# Patient Record
Sex: MALE | Race: WHITE | Employment: OTHER | URBAN - METROPOLITAN AREA
[De-identification: names, ages, dates, MRNs, and addresses within clinical notes are randomized per-mention and may not be internally consistent; named-entity substitution may affect disease eponyms.]

---

## 2017-01-25 RX ORDER — AMITRIPTYLINE HYDROCHLORIDE 25 MG/1
TABLET, FILM COATED ORAL
Qty: 30 TABLET | Refills: 0 | Status: SHIPPED | OUTPATIENT
Start: 2017-01-25 | End: 2017-02-01 | Stop reason: SDUPTHER

## 2017-02-01 ENCOUNTER — OFFICE VISIT (OUTPATIENT)
Dept: INTERNAL MEDICINE | Age: 67
End: 2017-02-01

## 2017-02-01 VITALS
HEART RATE: 79 BPM | BODY MASS INDEX: 22.88 KG/M2 | HEIGHT: 68 IN | SYSTOLIC BLOOD PRESSURE: 108 MMHG | DIASTOLIC BLOOD PRESSURE: 60 MMHG | WEIGHT: 151 LBS | TEMPERATURE: 97.3 F

## 2017-02-01 DIAGNOSIS — F41.8 OTHER SPECIFIED ANXIETY DISORDERS: Primary | Chronic | ICD-10-CM

## 2017-02-01 DIAGNOSIS — Z86.59 HISTORY OF DEPRESSION: ICD-10-CM

## 2017-02-01 DIAGNOSIS — Z72.0 SMOKING TRYING TO QUIT: ICD-10-CM

## 2017-02-01 PROCEDURE — 99213 OFFICE O/P EST LOW 20 MIN: CPT | Performed by: INTERNAL MEDICINE

## 2017-02-01 RX ORDER — SILDENAFIL 50 MG/1
TABLET, FILM COATED ORAL
Qty: 15 TABLET | Refills: 2 | Status: SHIPPED | OUTPATIENT
Start: 2017-02-01 | End: 2019-03-05 | Stop reason: ALTCHOICE

## 2017-02-01 RX ORDER — AMITRIPTYLINE HYDROCHLORIDE 25 MG/1
TABLET, FILM COATED ORAL
Qty: 30 TABLET | Refills: 5 | Status: SHIPPED | OUTPATIENT
Start: 2017-02-01 | End: 2018-02-14 | Stop reason: SDUPTHER

## 2017-02-01 ASSESSMENT — ENCOUNTER SYMPTOMS
EYE PAIN: 0
RESPIRATORY NEGATIVE: 1
GASTROINTESTINAL NEGATIVE: 1
ABDOMINAL PAIN: 0
SHORTNESS OF BREATH: 0
COLOR CHANGE: 0
BACK PAIN: 1

## 2017-02-01 ASSESSMENT — PATIENT HEALTH QUESTIONNAIRE - PHQ9
SUM OF ALL RESPONSES TO PHQ9 QUESTIONS 1 & 2: 0
1. LITTLE INTEREST OR PLEASURE IN DOING THINGS: 0
SUM OF ALL RESPONSES TO PHQ QUESTIONS 1-9: 0
2. FEELING DOWN, DEPRESSED OR HOPELESS: 0

## 2017-02-14 ENCOUNTER — OFFICE VISIT (OUTPATIENT)
Dept: INTERNAL MEDICINE | Age: 67
End: 2017-02-14

## 2017-02-14 VITALS
HEART RATE: 98 BPM | BODY MASS INDEX: 23.79 KG/M2 | WEIGHT: 157 LBS | TEMPERATURE: 97.8 F | OXYGEN SATURATION: 98 % | HEIGHT: 68 IN | DIASTOLIC BLOOD PRESSURE: 80 MMHG | SYSTOLIC BLOOD PRESSURE: 110 MMHG

## 2017-02-14 DIAGNOSIS — Z12.5 SCREENING FOR PROSTATE CANCER: ICD-10-CM

## 2017-02-14 DIAGNOSIS — Z00.00 ROUTINE GENERAL MEDICAL EXAMINATION AT A HEALTH CARE FACILITY: ICD-10-CM

## 2017-02-14 DIAGNOSIS — Z23 NEED FOR PROPHYLACTIC VACCINATION WITH COMBINED DIPHTHERIA-TETANUS-PERTUSSIS (DTP) VACCINE: ICD-10-CM

## 2017-02-14 DIAGNOSIS — Z13.1 SCREENING FOR DIABETES MELLITUS (DM): ICD-10-CM

## 2017-02-14 DIAGNOSIS — Z23 NEED FOR SHINGLES VACCINE: ICD-10-CM

## 2017-02-14 PROCEDURE — G0438 PPPS, INITIAL VISIT: HCPCS | Performed by: INTERNAL MEDICINE

## 2017-02-14 ASSESSMENT — PATIENT HEALTH QUESTIONNAIRE - PHQ9: SUM OF ALL RESPONSES TO PHQ QUESTIONS 1-9: 0

## 2017-02-14 ASSESSMENT — ANXIETY QUESTIONNAIRES: GAD7 TOTAL SCORE: 1

## 2017-02-14 ASSESSMENT — LIFESTYLE VARIABLES: HOW OFTEN DO YOU HAVE A DRINK CONTAINING ALCOHOL: 0

## 2017-02-27 ENCOUNTER — OFFICE VISIT (OUTPATIENT)
Dept: INTERNAL MEDICINE | Age: 67
End: 2017-02-27

## 2017-02-27 VITALS
HEART RATE: 82 BPM | TEMPERATURE: 97.8 F | HEIGHT: 68 IN | SYSTOLIC BLOOD PRESSURE: 110 MMHG | WEIGHT: 157 LBS | DIASTOLIC BLOOD PRESSURE: 70 MMHG | BODY MASS INDEX: 23.79 KG/M2

## 2017-02-27 DIAGNOSIS — F17.210 NICOTINE DEPENDENCE, CIGARETTES, UNCOMPLICATED: Primary | ICD-10-CM

## 2017-02-27 PROCEDURE — 99212 OFFICE O/P EST SF 10 MIN: CPT | Performed by: INTERNAL MEDICINE

## 2017-02-27 PROCEDURE — G0296 VISIT TO DETERM LDCT ELIG: HCPCS | Performed by: INTERNAL MEDICINE

## 2017-02-27 ASSESSMENT — ENCOUNTER SYMPTOMS
GASTROINTESTINAL NEGATIVE: 1
STRIDOR: 0
RESPIRATORY NEGATIVE: 1
TROUBLE SWALLOWING: 0
VOICE CHANGE: 0
ABDOMINAL PAIN: 0
ABDOMINAL DISTENTION: 0
SHORTNESS OF BREATH: 0
WHEEZING: 0
BLOOD IN STOOL: 0
COUGH: 0
CHOKING: 0
CHEST TIGHTNESS: 0

## 2017-03-06 ENCOUNTER — HOSPITAL ENCOUNTER (OUTPATIENT)
Dept: CT IMAGING | Age: 67
Discharge: HOME | End: 2017-03-06
Payer: COMMERCIAL

## 2017-03-06 VITALS — HEIGHT: 67 IN | WEIGHT: 151 LBS | BODY MASS INDEX: 23.7 KG/M2

## 2017-03-06 DIAGNOSIS — F17.210 NICOTINE DEPENDENCE, CIGARETTES, UNCOMPLICATED: ICD-10-CM

## 2017-03-06 PROCEDURE — G0297 LDCT FOR LUNG CA SCREEN: HCPCS

## 2017-08-28 ENCOUNTER — OFFICE VISIT (OUTPATIENT)
Dept: INTERNAL MEDICINE | Age: 67
End: 2017-08-28

## 2017-08-28 VITALS
HEART RATE: 88 BPM | WEIGHT: 142 LBS | DIASTOLIC BLOOD PRESSURE: 60 MMHG | HEIGHT: 67 IN | OXYGEN SATURATION: 99 % | TEMPERATURE: 97.2 F | SYSTOLIC BLOOD PRESSURE: 106 MMHG | BODY MASS INDEX: 22.29 KG/M2

## 2017-08-28 DIAGNOSIS — F17.210 CIGARETTE NICOTINE DEPENDENCE WITHOUT COMPLICATION: Primary | Chronic | ICD-10-CM

## 2017-08-28 DIAGNOSIS — Z23 NEED FOR TDAP VACCINATION: ICD-10-CM

## 2017-08-28 DIAGNOSIS — G25.3 MYOCLONUS: ICD-10-CM

## 2017-08-28 PROCEDURE — 99212 OFFICE O/P EST SF 10 MIN: CPT | Performed by: INTERNAL MEDICINE

## 2017-08-28 ASSESSMENT — ENCOUNTER SYMPTOMS
CHOKING: 0
COUGH: 0
TROUBLE SWALLOWING: 0
BLOOD IN STOOL: 0
ABDOMINAL DISTENTION: 0
SHORTNESS OF BREATH: 0
RESPIRATORY NEGATIVE: 1
ABDOMINAL PAIN: 0
BACK PAIN: 1

## 2017-08-29 PROCEDURE — 90471 IMMUNIZATION ADMIN: CPT | Performed by: INTERNAL MEDICINE

## 2017-08-29 PROCEDURE — 90715 TDAP VACCINE 7 YRS/> IM: CPT | Performed by: INTERNAL MEDICINE

## 2018-02-14 RX ORDER — AMITRIPTYLINE HYDROCHLORIDE 25 MG/1
TABLET, FILM COATED ORAL
Qty: 30 TABLET | Refills: 3 | Status: SHIPPED | OUTPATIENT
Start: 2018-02-14 | End: 2018-08-31

## 2018-02-26 ENCOUNTER — OFFICE VISIT (OUTPATIENT)
Dept: INTERNAL MEDICINE CLINIC | Age: 68
End: 2018-02-26
Payer: MEDICARE

## 2018-02-26 VITALS
DIASTOLIC BLOOD PRESSURE: 78 MMHG | WEIGHT: 144 LBS | TEMPERATURE: 97.4 F | BODY MASS INDEX: 22.6 KG/M2 | HEIGHT: 67 IN | OXYGEN SATURATION: 99 % | SYSTOLIC BLOOD PRESSURE: 100 MMHG | HEART RATE: 84 BPM

## 2018-02-26 DIAGNOSIS — Z12.5 PROSTATE CANCER SCREENING: ICD-10-CM

## 2018-02-26 DIAGNOSIS — Z00.00 ANNUAL PHYSICAL EXAM: Primary | ICD-10-CM

## 2018-02-26 DIAGNOSIS — F17.210 CIGARETTE NICOTINE DEPENDENCE WITHOUT COMPLICATION: ICD-10-CM

## 2018-02-26 DIAGNOSIS — L85.3 DRY SKIN DERMATITIS: ICD-10-CM

## 2018-02-26 PROCEDURE — 99397 PER PM REEVAL EST PAT 65+ YR: CPT | Performed by: INTERNAL MEDICINE

## 2018-02-26 RX ORDER — NICOTINE 21 MG/24HR
1 PATCH, TRANSDERMAL 24 HOURS TRANSDERMAL EVERY 24 HOURS
Qty: 42 PATCH | Refills: 0 | Status: SHIPPED | OUTPATIENT
Start: 2018-02-26 | End: 2018-08-31

## 2018-02-26 RX ORDER — GLYCERIN 100 %
LIQUID (ML) MISCELLANEOUS
Qty: 1 BOTTLE | Refills: 0 | Status: SHIPPED | OUTPATIENT
Start: 2018-02-26 | End: 2018-08-31 | Stop reason: ALTCHOICE

## 2018-02-26 RX ORDER — AMMONIUM LACTATE 12 G/100G
CREAM TOPICAL
Qty: 140 G | Refills: 2 | Status: SHIPPED | OUTPATIENT
Start: 2018-02-26 | End: 2018-03-28

## 2018-02-26 ASSESSMENT — PATIENT HEALTH QUESTIONNAIRE - PHQ9
1. LITTLE INTEREST OR PLEASURE IN DOING THINGS: 0
2. FEELING DOWN, DEPRESSED OR HOPELESS: 0
SUM OF ALL RESPONSES TO PHQ9 QUESTIONS 1 & 2: 0
SUM OF ALL RESPONSES TO PHQ QUESTIONS 1-9: 0

## 2018-02-26 ASSESSMENT — ENCOUNTER SYMPTOMS
COUGH: 0
BACK PAIN: 1
ABDOMINAL DISTENTION: 0
EYES NEGATIVE: 1
TROUBLE SWALLOWING: 0
RESPIRATORY NEGATIVE: 1
ABDOMINAL PAIN: 0
COLOR CHANGE: 0
SHORTNESS OF BREATH: 0
BLOOD IN STOOL: 0
CHOKING: 0

## 2018-02-26 NOTE — PROGRESS NOTES
Sanna Kirk is a 78 y.o. male smoker with history of remote neck injury, past depression issues, who presents with     Chief Complaint   Patient presents with    Annual Exam    Health Maintenance     The patient had repeat colonoscopy in 2015. The following laboratory reports since the past visit were reviewed (the ones pertinent to today's visit were discussed with the patient):    No visits with results within 3 Month(s) from this visit. Latest known visit with results is:   No results found for any previous visit. HPI:    HPI     The patient comes today for annual wellness exam, preventative visit. More detail above in the chief complaint(s) and below in the review of systems.      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   Maikel Noland, 1997, 2011    [facility], Dr Dori Addison [facility])       Social History     Social History    Marital status:      Spouse name: N/A    Number of children: 1    Years of education: N/A     Occupational History    retired [employer]      Social History Main Topics    Smoking status: Current Every Day Smoker     Packs/day: 0.80     Years: 40.00     Types: Cigarettes    Smokeless tobacco: Never Used      Comment: working on cutting back, will try the electronic cigarette    Alcohol use No    Drug use: No    Sexual activity: Not on file     Other Topics Concern    Not on file     Social History Narrative    Born in [facility], 1 brother and 3 sisters, keeps in touch with brother     Jason Ann came from [place] , worked at [email or web address] x 43 years, got injured in 2004        Lives at home with spouse, one son, keeps in touch    (Was on [employer] comp till 2012)    [address] housework, 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 ALLERGIES    Patient has no known allergies. Current Outpatient Prescriptions on File Prior to Visit   Medication Sig Dispense Refill    amitriptyline (ELAVIL) 25 MG tablet TAKE 1 TABLET BY MOUTH NIGHTLY 30 tablet 3    sildenafil (VIAGRA) 50 MG tablet TAKE 1 TABLET BY MOUTH AS NEEDED FOR ERECTILE DYSFUNCTION. 15 tablet 2    aspirin 81 MG EC tablet Take 81 mg by mouth daily. No current facility-administered medications on file prior to visit. Review of Systems   Constitutional: Negative for activity change, appetite change, fatigue and unexpected weight change. HENT: Negative for congestion, dental problem, nosebleeds and trouble swallowing. Eyes: Negative. Negative for visual disturbance. Respiratory: Negative. Negative for cough, choking and shortness of breath. Cardiovascular: Negative. Negative for chest pain, palpitations and leg swelling. Gastrointestinal: Negative for abdominal distention, abdominal pain and blood in stool. Endocrine: Negative. Negative for cold intolerance, heat intolerance, polydipsia and polyuria. Genitourinary: Negative for dysuria and hematuria. Musculoskeletal: Positive for arthralgias, back pain and neck stiffness. Negative for myalgias. Skin: Positive for rash (lower back, above the tailbone ). Negative for color change.         [facility] dermat[facility], had a lentigo lesion removed from the upper left side of the back, scheduled for follow-up appointment Lymphadenopathy:     He has no cervical adenopathy. Neurological: He is alert and oriented to person, place, and time. No cranial nerve deficit. Coordination normal.   Stance, gait well balanced and stable, Romberg negative. No focal motor neurological deficits. Skin: Skin is warm, dry and intact. Rash noted. The presacral area reveals fine superficial scaling on irritated, pink skin, at the site of chronic friction with patient's belt. Skin of distal fingers is markedly dry and with small fissures, no evidence of infection. The heels also reveal dry skin, few fissures. Psychiatric: His behavior is normal. His mood appears anxious. His speech is rapid and/or pressured. He is not slowed and not withdrawn. Thought content is not delusional. Cognition and memory are normal. He does not express inappropriate judgment. He does not exhibit a depressed mood. He exhibits normal recent memory and normal remote memory. Insight good, motivation good He is attentive. Assessment:    Nba Stone was seen today for annual exam and health maintenance. Diagnoses and all orders for this visit:    Annual physical exam  -     Basic Metabolic Panel; Future  -     Lipid Panel; Future  -     Urinalysis; Future  -     CBC; Future    Dry skin dermatitis             Affecting hands, feet, presacral area. Start glycerin for the hands, Lac-Hydrin for the feet, skin moisturizer and avoid friction of the presacral area    Prostate cancer screening  -     Psa screening; Future    Cigarette nicotine dependence without complication              Negative screening low dose CT in 2017, start NicoDerm CQ patches as directed    Other orders  -     ammonium lactate (AMLACTIN) 12 % cream; Apply topically as needed. -     Glycerin SOLN; Apply daily at HS on the hands  -     nicotine (NICODERM CQ) 14 MG/24HR;  Place 1 patch onto the skin every 24 hours        Plan:    Reviewed with the patient (/and caregiver if

## 2018-03-28 ENCOUNTER — TELEPHONE (OUTPATIENT)
Dept: CASE MANAGEMENT | Age: 68
End: 2018-03-28

## 2018-03-28 NOTE — TELEPHONE ENCOUNTER
Please place order for [id number] CT Lung Screening. Patient's previous Screening was done on 3/6/2017 and was read as a Lung Rad 1 with recommended follow-up in 12 months.

## 2018-05-31 DIAGNOSIS — Z00.00 ANNUAL PHYSICAL EXAM: ICD-10-CM

## 2018-05-31 DIAGNOSIS — Z12.5 PROSTATE CANCER SCREENING: ICD-10-CM

## 2018-05-31 LAB
ANION GAP SERPL CALCULATED.3IONS-SCNC: 10 MEQ/L (ref 7–13)
BILIRUBIN URINE: NEGATIVE
BLOOD, URINE: NEGATIVE
BUN BLDV-MCNC: 16 MG/DL (ref 8–23)
CALCIUM SERPL-MCNC: 8.3 MG/DL (ref 8.6–10.2)
CHLORIDE BLD-SCNC: 101 MEQ/L (ref 98–107)
CHOLESTEROL, TOTAL: 172 MG/DL (ref 0–199)
CLARITY: CLEAR
CO2: 27 MEQ/L (ref 22–29)
COLOR: YELLOW
CREAT SERPL-MCNC: 0.79 MG/DL (ref 0.7–1.2)
GFR AFRICAN AMERICAN: >60
GFR NON-AFRICAN AMERICAN: >60
GLUCOSE BLD-MCNC: 82 MG/DL (ref 74–109)
GLUCOSE URINE: NEGATIVE MG/DL
HCT VFR BLD CALC: 44.6 % (ref 42–52)
HDLC SERPL-MCNC: 72 MG/DL (ref 40–59)
HEMOGLOBIN: 15 G/DL (ref 14–18)
KETONES, URINE: NEGATIVE MG/DL
LDL CHOLESTEROL CALCULATED: 88 MG/DL (ref 0–129)
LEUKOCYTE ESTERASE, URINE: NEGATIVE
MCH RBC QN AUTO: 33.1 PG (ref 27–31.3)
MCHC RBC AUTO-ENTMCNC: 33.7 % (ref 33–37)
MCV RBC AUTO: 98.3 FL (ref 80–100)
NITRITE, URINE: NEGATIVE
PDW BLD-RTO: 13.2 % (ref 11.5–14.5)
PH UA: 7 (ref 5–9)
PLATELET # BLD: 304 K/UL (ref 130–400)
POTASSIUM SERPL-SCNC: 5 MEQ/L (ref 3.5–5.1)
PROSTATE SPECIFIC ANTIGEN: 1.29 NG/ML (ref 0–5.4)
PROTEIN UA: NEGATIVE MG/DL
RBC # BLD: 4.54 M/UL (ref 4.7–6.1)
SODIUM BLD-SCNC: 138 MEQ/L (ref 132–144)
SPECIFIC GRAVITY UA: 1.02 (ref 1–1.03)
TRIGL SERPL-MCNC: 60 MG/DL (ref 0–200)
UROBILINOGEN, URINE: 0.2 E.U./DL
WBC # BLD: 6.4 K/UL (ref 4.8–10.8)

## 2018-08-31 ENCOUNTER — OFFICE VISIT (OUTPATIENT)
Dept: INTERNAL MEDICINE CLINIC | Age: 68
End: 2018-08-31
Payer: MEDICARE

## 2018-08-31 VITALS
OXYGEN SATURATION: 97 % | HEART RATE: 68 BPM | SYSTOLIC BLOOD PRESSURE: 101 MMHG | HEIGHT: 67 IN | BODY MASS INDEX: 21.5 KG/M2 | DIASTOLIC BLOOD PRESSURE: 63 MMHG | WEIGHT: 137 LBS | TEMPERATURE: 97.6 F

## 2018-08-31 DIAGNOSIS — M54.2 CERVICALGIA: Primary | ICD-10-CM

## 2018-08-31 DIAGNOSIS — L08.9 INFECTION OF SKIN OF FINGER: ICD-10-CM

## 2018-08-31 PROCEDURE — 99213 OFFICE O/P EST LOW 20 MIN: CPT | Performed by: INTERNAL MEDICINE

## 2018-08-31 RX ORDER — CEPHALEXIN 250 MG/1
250 CAPSULE ORAL 4 TIMES DAILY
Qty: 20 CAPSULE | Refills: 0 | Status: SHIPPED | OUTPATIENT
Start: 2018-08-31 | End: 2018-12-13 | Stop reason: ALTCHOICE

## 2018-08-31 RX ORDER — AMITRIPTYLINE HYDROCHLORIDE 10 MG/1
TABLET, FILM COATED ORAL
Qty: 90 TABLET | Refills: 1 | Status: SHIPPED | OUTPATIENT
Start: 2018-08-31 | End: 2019-07-09 | Stop reason: SDUPTHER

## 2018-08-31 ASSESSMENT — ENCOUNTER SYMPTOMS
CHOKING: 0
CHEST TIGHTNESS: 0
VOICE CHANGE: 0
TROUBLE SWALLOWING: 0
BLOOD IN STOOL: 0
BACK PAIN: 1
ABDOMINAL PAIN: 0
SHORTNESS OF BREATH: 0
COUGH: 0

## 2018-09-01 NOTE — PROGRESS NOTES
Robe Muro is a 78 y.o. male nonsmoker, history of depression, of neck injury with chronic pain issues, who presents with     Chief Complaint   Patient presents with    Neck Pain     the patient is requesting referral to pain management specialist of choice    Wound Check     left thumb       Interim history: since the past visit 6 months ago, the patient has not been in the ER or hospital. Has continued to loose weight. Continues to live independently, very active with housework. The following laboratory reports since the past visit were reviewed (the ones pertinent to today's visit were discussed with the patient):    No visits with results within 3 Month(s) from this visit. Latest known visit with results is:   Orders Only on 05/31/2018   Component Date Value Ref Range Status    Sodium 05/31/2018 138  132 - 144 mEq/L Final    Potassium 05/31/2018 5.0  3.5 - 5.1 mEq/L Final    Chloride 05/31/2018 101  98 - 107 mEq/L Final    CO2 05/31/2018 27  22 - 29 mEq/L Final    Anion Gap 05/31/2018 10  7 - 13 mEq/L Final    Glucose 05/31/2018 82  74 - 109 mg/dL Final    BUN 05/31/2018 16  8 - 23 mg/dL Final    CREATININE 05/31/2018 0.79  0.70 - 1.20 mg/dL Final    GFR Non- 05/31/2018 >60.0  >60 Final    Comment: >60 mL/min/1.73m2 EGFR, calc. for ages 25 and older using the  MDRD formula (not corrected for weight), is valid for stable  renal function.  GFR  05/31/2018 >60.0  >60 Final    Comment: >60 mL/min/1.73m2 EGFR, calc. for ages 25 and older using the  MDRD formula (not corrected for weight), is valid for stable  renal function.  Calcium 05/31/2018 8.3* 8.6 - 10.2 mg/dL Final    Cholesterol, Total 05/31/2018 172  0 - 199 mg/dL Final    ATP III Cholesterol classification is Desirable.     Triglycerides 05/31/2018 60  0 - 200 mg/dL Final    ATP III Triglycerides Classification is Normal.    HDL 05/31/2018 72* 40 - 59 mg/dL Final    Comment: ATP III HDL Cholesterol Classification is high. Expected Values:    Males:    >55 = No Risk            35-55 = Moderate Risk            <35 = High Risk    Females:  >65 = No Risk            45-65 = Moderate Risk            <45 = High Risk    NCEP Guidelines: Third Report May 2001  >59 = negative risk factor for CHD  <40 = major risk factor for CHD      LDL Calculated 05/31/2018 88  0 - 129 mg/dL Final    ATP III LDL Classification is Optimal.    Color, UA 05/31/2018 Yellow  Straw/Yellow Final    Clarity, UA 05/31/2018 Clear  Clear Final    Glucose, Ur 05/31/2018 Negative  Negative mg/dL Final    Bilirubin Urine 05/31/2018 Negative  Negative Final    Ketones, Urine 05/31/2018 Negative  Negative mg/dL Final    Specific Gravity, UA 05/31/2018 1.024  1.005 - 1.030 Final    Blood, Urine 05/31/2018 Negative  Negative Final    pH, UA 05/31/2018 7.0  5.0 - 9.0 Final    Protein, UA 05/31/2018 Negative  Negative mg/dL Final    Urobilinogen, Urine 05/31/2018 0.2  <2.0 E.U./dL Final    Nitrite, Urine 05/31/2018 Negative  Negative Final    Leukocyte Esterase, Urine 05/31/2018 Negative  Negative Final    WBC 05/31/2018 6.4  4.8 - 10.8 K/uL Final    RBC 05/31/2018 4.54* 4.70 - 6.10 M/uL Final    Hemoglobin 05/31/2018 15.0  14.0 - 18.0 g/dL Final    Hematocrit 05/31/2018 44.6  42.0 - 52.0 % Final    MCV 05/31/2018 98.3  80.0 - 100.0 fL Final    MCH 05/31/2018 33.1* 27.0 - 31.3 pg Final    MCHC 05/31/2018 33.7  33.0 - 37.0 % Final    RDW 05/31/2018 13.2  11.5 - 14.5 % Final    Platelets 15/58/1134 304  130 - 400 K/uL Final    PSA 05/31/2018 1.29  0.00 - 5.40 ng/mL Final    Comment: When the Total PSA is between 3.00 and 10.00 ng/mL, consider  requesting a Free PSA to aid in diagnosis. HPI:    Neck Pain    This is a chronic problem. The current episode started more than 1 year ago. The problem has been gradually worsening. The pain is associated with a remote injury.  The pain is present in the midline, weakness, numbness and headaches. Psychiatric/Behavioral: Positive for dysphoric mood. Negative for confusion and decreased concentration. The patient is nervous/anxious. Vitals:    08/31/18 1121   BP: 101/63   Site: Left Arm   Position: Sitting   Cuff Size: Medium Adult   Pulse: 68   Temp: 97.6 °F (36.4 °C)   TempSrc: Tympanic   SpO2: 97%   Weight: 137 lb (62.1 kg)   Height: 5' 6.5\" (1.689 m)       Physical Exam   Constitutional: He is oriented to person, place, and time. No distress. Thinly built, age appropriate     HENT:   Head: Atraumatic. Eyes: Conjunctivae and EOM are normal. No scleral icterus. Neck: No JVD present. Muscular tenderness present. Decreased range of motion present. No thyromegaly present. Cardiovascular: Regular rhythm, normal heart sounds and intact distal pulses. Pulmonary/Chest: Effort normal. No respiratory distress. Abdominal: He exhibits no distension. Musculoskeletal:        Hands:  Exam of the left thumb reveals a 2 mm yellow crust of the dorsum of the distal phalanx, with mild surrounding erythema, no active drainage   Lymphadenopathy:     He has no cervical adenopathy. Neurological: He is alert and oriented to person, place, and time. Coordination normal.   Skin: Skin is warm. Psychiatric: His mood appears anxious. His speech is rapid and/or pressured. He is not slowed and not withdrawn. Cognition and memory are not impaired. He does not express inappropriate judgment. He does not exhibit a depressed mood. He is attentive. Assessment:    Nicole Alejandre was seen today for neck pain and wound check. Diagnoses and all orders for this visit:    Cervicalgia  -     Referral To Unknown External Pain Management    Infection of skin of finger            Start oral antibiotic, continue topical antibiotic, protect area from injury, call if no resolution in 5 days    Other orders  -     amitriptyline (ELAVIL) 10 MG tablet;  Take 1 tab po daily at   -     cephALEXin

## 2018-10-02 ENCOUNTER — TELEPHONE (OUTPATIENT)
Dept: INTERNAL MEDICINE CLINIC | Age: 68
End: 2018-10-02

## 2018-12-13 ENCOUNTER — OFFICE VISIT (OUTPATIENT)
Dept: INTERNAL MEDICINE CLINIC | Age: 68
End: 2018-12-13
Payer: MEDICARE

## 2018-12-13 VITALS
OXYGEN SATURATION: 95 % | HEIGHT: 67 IN | SYSTOLIC BLOOD PRESSURE: 107 MMHG | DIASTOLIC BLOOD PRESSURE: 71 MMHG | WEIGHT: 143 LBS | BODY MASS INDEX: 22.44 KG/M2 | HEART RATE: 94 BPM | TEMPERATURE: 97.6 F

## 2018-12-13 DIAGNOSIS — M54.12 CERVICAL RADICULOPATHY: Primary | ICD-10-CM

## 2018-12-13 PROCEDURE — 99213 OFFICE O/P EST LOW 20 MIN: CPT | Performed by: INTERNAL MEDICINE

## 2018-12-13 RX ORDER — HYDROCORTISONE ACETATE, IODOQUINOL 19; 10 MG/G; MG/G
CREAM TOPICAL
COMMUNITY
Start: 2018-12-11 | End: 2020-01-07 | Stop reason: CLARIF

## 2018-12-13 ASSESSMENT — ENCOUNTER SYMPTOMS
BACK PAIN: 1
SHORTNESS OF BREATH: 0
TROUBLE SWALLOWING: 0
ABDOMINAL PAIN: 0
VOICE CHANGE: 0

## 2018-12-14 NOTE — PROGRESS NOTES
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 ALLERGIES    Patient has no known allergies. Current Outpatient Prescriptions on File Prior to Visit   Medication Sig Dispense Refill    amitriptyline (ELAVIL) 10 MG tablet Take 1 tab po daily at HS 90 tablet 1    sildenafil (VIAGRA) 50 MG tablet TAKE 1 TABLET BY MOUTH AS NEEDED FOR ERECTILE DYSFUNCTION. 15 tablet 2    aspirin 81 MG EC tablet Take 81 mg by mouth daily. No current facility-administered medications on file prior to visit. Review of Systems   Constitutional: Positive for unexpected weight change. Negative for activity change and appetite change. HENT: Negative for congestion, trouble swallowing and voice change. Respiratory: Negative for shortness of breath. Cardiovascular: Negative for chest pain. Gastrointestinal: Negative for abdominal pain. Endocrine: Negative for cold intolerance and heat intolerance. Genitourinary: Negative for flank pain and hematuria. Musculoskeletal: Positive for back pain, neck pain and neck stiffness. Negative for arthralgias, gait problem and joint swelling. Skin: Negative for rash. Allergic/Immunologic: Negative for immunocompromised state. Neurological: Positive for weakness (entire right arm) and numbness (right side of the neck and right arm). Negative for dizziness, tremors, speech difficulty and headaches.    Hematological: Does not

## 2018-12-20 ENCOUNTER — TELEPHONE (OUTPATIENT)
Dept: INTERNAL MEDICINE CLINIC | Age: 68
End: 2018-12-20

## 2018-12-20 ENCOUNTER — HOSPITAL ENCOUNTER (OUTPATIENT)
Dept: MRI IMAGING | Age: 68
Discharge: HOME | End: 2018-12-22
Payer: MEDICARE

## 2018-12-20 DIAGNOSIS — M50.222 OTHER CERVICAL DISC DISPLACEMENT AT C5-C6 LEVEL: Primary | ICD-10-CM

## 2018-12-20 DIAGNOSIS — M54.12 CERVICAL RADICULOPATHY: ICD-10-CM

## 2018-12-20 PROCEDURE — 72141 MRI NECK SPINE W/O DYE: CPT

## 2018-12-20 RX ORDER — PREDNISONE 10 MG/1
TABLET ORAL
Qty: 30 TABLET | Refills: 0 | Status: SHIPPED | OUTPATIENT
Start: 2018-12-20 | End: 2019-03-04 | Stop reason: ALTCHOICE

## 2018-12-26 DIAGNOSIS — M50.20 CERVICAL DISC DISPLACEMENT: Primary | ICD-10-CM

## 2018-12-27 NOTE — TELEPHONE ENCOUNTER
Pt is\" checking w a specialist in [facility]. And will have dr Nicki Blanca as back up. \"

## 2018-12-31 DIAGNOSIS — M50.20 CERVICAL DISC DISPLACEMENT: Primary | ICD-10-CM

## 2019-01-31 ENCOUNTER — TELEPHONE (OUTPATIENT)
Dept: INTERNAL MEDICINE CLINIC | Age: 69
End: 2019-01-31

## 2019-02-12 PROBLEM — M50.222 HERNIATED NUCLEUS PULPOSUS, C5-6: Status: ACTIVE | Noted: 2019-02-12

## 2019-02-12 PROBLEM — M50.223 HERNIATED NUCLEUS PULPOSUS, C6-7: Status: ACTIVE | Noted: 2019-02-12

## 2019-02-12 PROBLEM — M50.21 HERNIATED NUCLEUS PULPOSUS, C3-4: Status: ACTIVE | Noted: 2019-02-12

## 2019-02-18 ENCOUNTER — HOSPITAL ENCOUNTER (OUTPATIENT)
Dept: NON INVASIVE DIAGNOSTICS | Age: 69
Discharge: HOME | End: 2019-02-18
Payer: MEDICARE

## 2019-02-18 LAB
EKG ATRIAL RATE: 75 BPM
EKG P AXIS: 83 DEGREES
EKG P-R INTERVAL: 170 MS
EKG Q-T INTERVAL: 404 MS
EKG QRS DURATION: 90 MS
EKG QTC CALCULATION (BAZETT): 451 MS
EKG R AXIS: 124 DEGREES
EKG T AXIS: 72 DEGREES
EKG VENTRICULAR RATE: 75 BPM

## 2019-02-18 PROCEDURE — 93005 ELECTROCARDIOGRAM TRACING: CPT

## 2019-02-19 PROCEDURE — 93010 ELECTROCARDIOGRAM REPORT: CPT | Performed by: INTERNAL MEDICINE

## 2019-03-04 ENCOUNTER — OFFICE VISIT (OUTPATIENT)
Dept: INTERNAL MEDICINE CLINIC | Age: 69
End: 2019-03-04
Payer: MEDICARE

## 2019-03-04 VITALS
OXYGEN SATURATION: 96 % | DIASTOLIC BLOOD PRESSURE: 72 MMHG | TEMPERATURE: 98.2 F | BODY MASS INDEX: 24.43 KG/M2 | WEIGHT: 152 LBS | SYSTOLIC BLOOD PRESSURE: 131 MMHG | HEART RATE: 91 BPM | HEIGHT: 66 IN

## 2019-03-04 DIAGNOSIS — Z01.818 PREOPERATIVE CLEARANCE: Primary | ICD-10-CM

## 2019-03-04 DIAGNOSIS — M50.10 HERNIATION OF CERVICAL INTERVERTEBRAL DISC WITH RADICULOPATHY: ICD-10-CM

## 2019-03-04 DIAGNOSIS — M48.02 CERVICAL STENOSIS OF SPINAL CANAL: ICD-10-CM

## 2019-03-04 PROBLEM — G95.20 CERVICAL SPINAL CORD COMPRESSION (HCC): Status: ACTIVE | Noted: 2019-01-23

## 2019-03-04 PROCEDURE — 99213 OFFICE O/P EST LOW 20 MIN: CPT | Performed by: INTERNAL MEDICINE

## 2019-03-04 ASSESSMENT — ENCOUNTER SYMPTOMS
ABDOMINAL PAIN: 0
CHEST TIGHTNESS: 0
DIARRHEA: 0
COUGH: 0
TROUBLE SWALLOWING: 0
WHEEZING: 0
SORE THROAT: 0
CHOKING: 0
SHORTNESS OF BREATH: 0
VOICE CHANGE: 0
EYE PAIN: 0
RHINORRHEA: 0
BACK PAIN: 1

## 2019-03-04 ASSESSMENT — PATIENT HEALTH QUESTIONNAIRE - PHQ9
SUM OF ALL RESPONSES TO PHQ QUESTIONS 1-9: 0
SUM OF ALL RESPONSES TO PHQ QUESTIONS 1-9: 0
2. FEELING DOWN, DEPRESSED OR HOPELESS: 0
1. LITTLE INTEREST OR PLEASURE IN DOING THINGS: 0
SUM OF ALL RESPONSES TO PHQ9 QUESTIONS 1 & 2: 0

## 2019-03-05 ENCOUNTER — HOSPITAL ENCOUNTER (EMERGENCY)
Age: 69
Discharge: NURSING, REHAB OR LONG-TERM CARE | End: 2019-03-06
Payer: MEDICARE

## 2019-03-05 ENCOUNTER — APPOINTMENT (OUTPATIENT)
Dept: CT IMAGING | Age: 69
End: 2019-03-05
Payer: MEDICARE

## 2019-03-05 DIAGNOSIS — G44.89 OTHER HEADACHE SYNDROME: Primary | ICD-10-CM

## 2019-03-05 LAB
BASOPHILS ABSOLUTE: 0 K/UL (ref 0–0.2)
BASOPHILS RELATIVE PERCENT: 0.1 %
EOSINOPHILS ABSOLUTE: 0.1 K/UL (ref 0–0.7)
EOSINOPHILS RELATIVE PERCENT: 0.4 %
HCT VFR BLD CALC: 41 % (ref 42–52)
HEMOGLOBIN: 13.9 G/DL (ref 14–18)
LYMPHOCYTES ABSOLUTE: 0.5 K/UL (ref 1–4.8)
LYMPHOCYTES RELATIVE PERCENT: 3.8 %
MCH RBC QN AUTO: 33.1 PG (ref 27–31.3)
MCHC RBC AUTO-ENTMCNC: 33.9 % (ref 33–37)
MCV RBC AUTO: 97.7 FL (ref 80–100)
MONOCYTES ABSOLUTE: 0.1 K/UL (ref 0.2–0.8)
MONOCYTES RELATIVE PERCENT: 0.8 %
NEUTROPHILS ABSOLUTE: 13.3 K/UL (ref 1.4–6.5)
NEUTROPHILS RELATIVE PERCENT: 94.9 %
PDW BLD-RTO: 13.1 % (ref 11.5–14.5)
PLATELET # BLD: 253 K/UL (ref 130–400)
RBC # BLD: 4.19 M/UL (ref 4.7–6.1)
WBC # BLD: 14 K/UL (ref 4.8–10.8)

## 2019-03-05 PROCEDURE — 2580000003 HC RX 258: Performed by: PHYSICIAN ASSISTANT

## 2019-03-05 PROCEDURE — 85025 COMPLETE CBC W/AUTO DIFF WBC: CPT

## 2019-03-05 PROCEDURE — 83605 ASSAY OF LACTIC ACID: CPT

## 2019-03-05 PROCEDURE — 99284 EMERGENCY DEPT VISIT MOD MDM: CPT

## 2019-03-05 PROCEDURE — 84484 ASSAY OF TROPONIN QUANT: CPT

## 2019-03-05 PROCEDURE — 80053 COMPREHEN METABOLIC PANEL: CPT

## 2019-03-05 PROCEDURE — 72125 CT NECK SPINE W/O DYE: CPT

## 2019-03-05 PROCEDURE — 83735 ASSAY OF MAGNESIUM: CPT

## 2019-03-05 PROCEDURE — 70450 CT HEAD/BRAIN W/O DYE: CPT

## 2019-03-05 PROCEDURE — 82553 CREATINE MB FRACTION: CPT

## 2019-03-05 PROCEDURE — 82550 ASSAY OF CK (CPK): CPT

## 2019-03-05 PROCEDURE — 36415 COLL VENOUS BLD VENIPUNCTURE: CPT

## 2019-03-05 RX ORDER — 0.9 % SODIUM CHLORIDE 0.9 %
1000 INTRAVENOUS SOLUTION INTRAVENOUS ONCE
Status: COMPLETED | OUTPATIENT
Start: 2019-03-05 | End: 2019-03-06

## 2019-03-05 RX ADMIN — SODIUM CHLORIDE 1000 ML: 9 INJECTION, SOLUTION INTRAVENOUS at 23:36

## 2019-03-05 ASSESSMENT — PAIN DESCRIPTION - LOCATION: LOCATION: HEAD;NECK

## 2019-03-05 ASSESSMENT — PAIN SCALES - GENERAL: PAINLEVEL_OUTOF10: 8

## 2019-03-06 VITALS
BODY MASS INDEX: 24.43 KG/M2 | OXYGEN SATURATION: 97 % | RESPIRATION RATE: 18 BRPM | SYSTOLIC BLOOD PRESSURE: 110 MMHG | TEMPERATURE: 98.1 F | DIASTOLIC BLOOD PRESSURE: 68 MMHG | HEIGHT: 66 IN | WEIGHT: 152 LBS | HEART RATE: 98 BPM

## 2019-03-06 LAB
ALBUMIN SERPL-MCNC: 4.3 G/DL (ref 3.5–4.6)
ALP BLD-CCNC: 65 U/L (ref 35–104)
ALT SERPL-CCNC: 25 U/L (ref 0–41)
ANION GAP SERPL CALCULATED.3IONS-SCNC: 15 MEQ/L (ref 9–15)
AST SERPL-CCNC: 26 U/L (ref 0–40)
BILIRUB SERPL-MCNC: 0.3 MG/DL (ref 0.2–0.7)
BUN BLDV-MCNC: 15 MG/DL (ref 8–23)
CALCIUM SERPL-MCNC: 8.4 MG/DL (ref 8.5–9.9)
CHLORIDE BLD-SCNC: 94 MEQ/L (ref 95–107)
CK MB: 3.2 NG/ML (ref 0–6.7)
CO2: 23 MEQ/L (ref 20–31)
CREAT SERPL-MCNC: 0.79 MG/DL (ref 0.7–1.2)
CREATINE KINASE-MB INDEX: 1.3 % (ref 0–3.5)
GFR AFRICAN AMERICAN: >60
GFR NON-AFRICAN AMERICAN: >60
GLOBULIN: 2.2 G/DL (ref 2.3–3.5)
GLUCOSE BLD-MCNC: 170 MG/DL (ref 70–99)
LACTIC ACID: 4.5 MMOL/L (ref 0.5–2.2)
MAGNESIUM: 1.7 MG/DL (ref 1.7–2.4)
POTASSIUM SERPL-SCNC: 4.2 MEQ/L (ref 3.4–4.9)
SODIUM BLD-SCNC: 132 MEQ/L (ref 135–144)
TOTAL CK: 250 U/L (ref 0–190)
TOTAL PROTEIN: 6.5 G/DL (ref 6.3–8)
TROPONIN: <0.01 NG/ML (ref 0–0.01)

## 2019-03-06 PROCEDURE — 6360000002 HC RX W HCPCS: Performed by: PHYSICIAN ASSISTANT

## 2019-03-06 PROCEDURE — 2580000003 HC RX 258: Performed by: PHYSICIAN ASSISTANT

## 2019-03-06 PROCEDURE — 96374 THER/PROPH/DIAG INJ IV PUSH: CPT

## 2019-03-06 PROCEDURE — 96375 TX/PRO/DX INJ NEW DRUG ADDON: CPT

## 2019-03-06 RX ORDER — DEXAMETHASONE SODIUM PHOSPHATE 10 MG/ML
10 INJECTION, SOLUTION INTRAMUSCULAR; INTRAVENOUS ONCE
Status: COMPLETED | OUTPATIENT
Start: 2019-03-06 | End: 2019-03-06

## 2019-03-06 RX ORDER — DEXAMETHASONE SODIUM PHOSPHATE 4 MG/ML
10 INJECTION, SOLUTION INTRA-ARTICULAR; INTRALESIONAL; INTRAMUSCULAR; INTRAVENOUS; SOFT TISSUE ONCE
Status: DISCONTINUED | OUTPATIENT
Start: 2019-03-06 | End: 2019-03-06 | Stop reason: RX

## 2019-03-06 RX ORDER — METOCLOPRAMIDE 10 MG/1
10 TABLET ORAL 4 TIMES DAILY
Qty: 30 TABLET | Refills: 0 | Status: SHIPPED | OUTPATIENT
Start: 2019-03-06 | End: 2019-04-30 | Stop reason: ALTCHOICE

## 2019-03-06 RX ORDER — 0.9 % SODIUM CHLORIDE 0.9 %
1000 INTRAVENOUS SOLUTION INTRAVENOUS ONCE
Status: COMPLETED | OUTPATIENT
Start: 2019-03-06 | End: 2019-03-06

## 2019-03-06 RX ORDER — METOCLOPRAMIDE HYDROCHLORIDE 5 MG/ML
10 INJECTION INTRAMUSCULAR; INTRAVENOUS ONCE
Status: COMPLETED | OUTPATIENT
Start: 2019-03-06 | End: 2019-03-06

## 2019-03-06 RX ORDER — MORPHINE SULFATE 4 MG/ML
4 INJECTION, SOLUTION INTRAMUSCULAR; INTRAVENOUS ONCE
Status: COMPLETED | OUTPATIENT
Start: 2019-03-06 | End: 2019-03-06

## 2019-03-06 RX ORDER — DIPHENHYDRAMINE HYDROCHLORIDE 50 MG/ML
25 INJECTION INTRAMUSCULAR; INTRAVENOUS ONCE
Status: COMPLETED | OUTPATIENT
Start: 2019-03-06 | End: 2019-03-06

## 2019-03-06 RX ORDER — KETOROLAC TROMETHAMINE 30 MG/ML
30 INJECTION, SOLUTION INTRAMUSCULAR; INTRAVENOUS ONCE
Status: COMPLETED | OUTPATIENT
Start: 2019-03-06 | End: 2019-03-06

## 2019-03-06 RX ADMIN — MORPHINE SULFATE 4 MG: 4 INJECTION INTRAVENOUS at 02:01

## 2019-03-06 RX ADMIN — SODIUM CHLORIDE 1000 ML: 9 INJECTION, SOLUTION INTRAVENOUS at 01:23

## 2019-03-06 RX ADMIN — DEXAMETHASONE SODIUM PHOSPHATE 10 MG: 10 INJECTION INTRAMUSCULAR; INTRAVENOUS at 01:13

## 2019-03-06 RX ADMIN — DIPHENHYDRAMINE HYDROCHLORIDE 25 MG: 50 INJECTION, SOLUTION INTRAMUSCULAR; INTRAVENOUS at 01:16

## 2019-03-06 RX ADMIN — KETOROLAC TROMETHAMINE 30 MG: 30 INJECTION, SOLUTION INTRAMUSCULAR; INTRAVENOUS at 01:15

## 2019-03-06 RX ADMIN — METOCLOPRAMIDE 10 MG: 5 INJECTION, SOLUTION INTRAMUSCULAR; INTRAVENOUS at 01:19

## 2019-03-06 ASSESSMENT — ENCOUNTER SYMPTOMS
EYE PAIN: 0
SHORTNESS OF BREATH: 0
COLOR CHANGE: 0
VOMITING: 0
ABDOMINAL PAIN: 0
TROUBLE SWALLOWING: 0
DIARRHEA: 0
APNEA: 0
ALLERGIC/IMMUNOLOGIC NEGATIVE: 1
NAUSEA: 1

## 2019-03-06 ASSESSMENT — PAIN SCALES - GENERAL
PAINLEVEL_OUTOF10: 8
PAINLEVEL_OUTOF10: 6

## 2019-03-07 ENCOUNTER — OFFICE VISIT (OUTPATIENT)
Dept: INTERNAL MEDICINE CLINIC | Age: 69
End: 2019-03-07
Payer: MEDICARE

## 2019-03-07 VITALS
TEMPERATURE: 98 F | OXYGEN SATURATION: 96 % | WEIGHT: 152 LBS | HEIGHT: 66 IN | BODY MASS INDEX: 24.43 KG/M2 | RESPIRATION RATE: 14 BRPM | HEART RATE: 80 BPM | DIASTOLIC BLOOD PRESSURE: 74 MMHG | SYSTOLIC BLOOD PRESSURE: 125 MMHG

## 2019-03-07 DIAGNOSIS — Z98.890 HX OF CERVICAL SPINE SURGERY: ICD-10-CM

## 2019-03-07 DIAGNOSIS — H10.31 ACUTE CONJUNCTIVITIS OF RIGHT EYE, UNSPECIFIED ACUTE CONJUNCTIVITIS TYPE: Primary | ICD-10-CM

## 2019-03-07 PROCEDURE — 99213 OFFICE O/P EST LOW 20 MIN: CPT | Performed by: INTERNAL MEDICINE

## 2019-03-07 RX ORDER — CIPROFLOXACIN HYDROCHLORIDE 3.5 MG/ML
1 SOLUTION/ DROPS TOPICAL EVERY 6 HOURS
Qty: 1 BOTTLE | Refills: 0 | Status: SHIPPED | OUTPATIENT
Start: 2019-03-07 | End: 2019-03-17

## 2019-03-07 ASSESSMENT — ENCOUNTER SYMPTOMS
TROUBLE SWALLOWING: 0
RHINORRHEA: 0
EYE PAIN: 0
COUGH: 0
SINUS PRESSURE: 0
PHOTOPHOBIA: 0
ABDOMINAL PAIN: 0
SORE THROAT: 0
EYE ITCHING: 0
DOUBLE VISION: 0
VOICE CHANGE: 0
EYE REDNESS: 1
EYE DISCHARGE: 0
CHEST TIGHTNESS: 0

## 2019-03-29 ENCOUNTER — OFFICE VISIT (OUTPATIENT)
Dept: INTERNAL MEDICINE CLINIC | Age: 69
End: 2019-03-29
Payer: MEDICARE

## 2019-03-29 VITALS
WEIGHT: 149 LBS | HEIGHT: 66 IN | HEART RATE: 93 BPM | SYSTOLIC BLOOD PRESSURE: 120 MMHG | BODY MASS INDEX: 23.95 KG/M2 | OXYGEN SATURATION: 97 % | DIASTOLIC BLOOD PRESSURE: 78 MMHG | TEMPERATURE: 98.7 F

## 2019-03-29 DIAGNOSIS — F41.9 ANXIETY DISORDER, UNSPECIFIED TYPE: ICD-10-CM

## 2019-03-29 DIAGNOSIS — G90.2 HORNER'S SYNDROME: Primary | ICD-10-CM

## 2019-03-29 DIAGNOSIS — M62.81 MUSCLE WEAKNESS OF LEFT ARM: ICD-10-CM

## 2019-03-29 PROCEDURE — 99213 OFFICE O/P EST LOW 20 MIN: CPT | Performed by: INTERNAL MEDICINE

## 2019-03-29 RX ORDER — DOXEPIN HYDROCHLORIDE 10 MG/1
10 CAPSULE ORAL 3 TIMES DAILY PRN
Qty: 60 CAPSULE | Refills: 3 | Status: SHIPPED | OUTPATIENT
Start: 2019-03-29 | End: 2019-04-30

## 2019-03-29 ASSESSMENT — ENCOUNTER SYMPTOMS
ABDOMINAL PAIN: 0
SHORTNESS OF BREATH: 0
PHOTOPHOBIA: 0
RHINORRHEA: 0
COUGH: 0
FOREIGN BODY SENSATION: 0
EYE ITCHING: 0
BLURRED VISION: 0
EYE REDNESS: 1
EYE DISCHARGE: 0
DOUBLE VISION: 0

## 2019-04-24 ENCOUNTER — HOSPITAL ENCOUNTER (OUTPATIENT)
Dept: MRI IMAGING | Age: 69
Discharge: HOME | End: 2019-04-26
Payer: MEDICARE

## 2019-04-24 DIAGNOSIS — M48.02 CERVICAL STENOSIS OF SPINE: ICD-10-CM

## 2019-04-26 ENCOUNTER — HOSPITAL ENCOUNTER (OUTPATIENT)
Dept: MRI IMAGING | Age: 69
Discharge: HOME | End: 2019-04-28
Payer: MEDICARE

## 2019-04-26 DIAGNOSIS — M48.02 STENOSIS OF CERVICAL SPINE: ICD-10-CM

## 2019-04-26 PROCEDURE — 6360000004 HC RX CONTRAST MEDICATION: Performed by: PHYSICIAN ASSISTANT

## 2019-04-26 PROCEDURE — A9579 GAD-BASE MR CONTRAST NOS,1ML: HCPCS | Performed by: PHYSICIAN ASSISTANT

## 2019-04-26 PROCEDURE — 72156 MRI NECK SPINE W/O & W/DYE: CPT

## 2019-04-26 RX ORDER — SODIUM CHLORIDE 0.9 % (FLUSH) 0.9 %
10 SYRINGE (ML) INJECTION 2 TIMES DAILY
Status: DISCONTINUED | OUTPATIENT
Start: 2019-04-26 | End: 2019-04-29 | Stop reason: HOSPADM

## 2019-04-26 RX ADMIN — GADOTERIDOL 15 ML: 279.3 INJECTION, SOLUTION INTRAVENOUS at 16:18

## 2019-04-30 ENCOUNTER — HOSPITAL ENCOUNTER (OUTPATIENT)
Dept: PHYSICAL THERAPY | Age: 69
Setting detail: THERAPIES SERIES
Discharge: HOME | End: 2019-04-30
Payer: MEDICARE

## 2019-04-30 ENCOUNTER — OFFICE VISIT (OUTPATIENT)
Dept: INTERNAL MEDICINE CLINIC | Age: 69
End: 2019-04-30
Payer: MEDICARE

## 2019-04-30 VITALS
HEIGHT: 67 IN | RESPIRATION RATE: 14 BRPM | WEIGHT: 149.8 LBS | BODY MASS INDEX: 23.51 KG/M2 | SYSTOLIC BLOOD PRESSURE: 102 MMHG | TEMPERATURE: 98.2 F | DIASTOLIC BLOOD PRESSURE: 65 MMHG | HEART RATE: 80 BPM | OXYGEN SATURATION: 100 %

## 2019-04-30 DIAGNOSIS — S16.1XXA STRAIN OF STERNOCLEIDOMASTOID MUSCLE, INITIAL ENCOUNTER: Primary | ICD-10-CM

## 2019-04-30 PROCEDURE — 97162 PT EVAL MOD COMPLEX 30 MIN: CPT

## 2019-04-30 PROCEDURE — 99212 OFFICE O/P EST SF 10 MIN: CPT | Performed by: INTERNAL MEDICINE

## 2019-04-30 ASSESSMENT — ENCOUNTER SYMPTOMS
COUGH: 0
CHEST TIGHTNESS: 0
TROUBLE SWALLOWING: 0
ABDOMINAL PAIN: 0

## 2019-04-30 ASSESSMENT — PAIN DESCRIPTION - PROGRESSION: CLINICAL_PROGRESSION: GRADUALLY IMPROVING

## 2019-04-30 ASSESSMENT — PAIN DESCRIPTION - ONSET: ONSET: ON-GOING

## 2019-04-30 ASSESSMENT — PAIN SCALES - GENERAL: PAINLEVEL_OUTOF10: 3

## 2019-04-30 ASSESSMENT — PAIN DESCRIPTION - PAIN TYPE: TYPE: SURGICAL PAIN;CHRONIC PAIN

## 2019-04-30 ASSESSMENT — PAIN DESCRIPTION - ORIENTATION: ORIENTATION: RIGHT;LEFT

## 2019-04-30 ASSESSMENT — PAIN - FUNCTIONAL ASSESSMENT: PAIN_FUNCTIONAL_ASSESSMENT: PREVENTS OR INTERFERES WITH ALL ACTIVE AND SOME PASSIVE ACTIVITIES

## 2019-04-30 ASSESSMENT — PAIN DESCRIPTION - LOCATION: LOCATION: NECK

## 2019-04-30 NOTE — PROGRESS NOTES
[address], [address]     Ph: [phone]  Fax: [phone]    [] Certification  [] Recertification [x]  Plan of Care  [] Progress Note [] Discharge      To:  Referring Practitioner: Relda Cooks      From:  Aracely Rodrigues, PT, DPT  Patient: Diane Lazo     : 1950  Diagnosis: B UE weakness, paresthesias, atrophy s/p anterior foraminectomy and SC decompression 3/5/19     Date: 2019  Treatment Diagnosis: decreased B UE strength, decreased cervical and R UE arom, decreased postural awareness, decreased fucntional activity tolernace, and increased pain    Plan of Care/Certification Expiration Date: 19  Progress Report Period from:  2019  to 2019    Total # of Visits to Date: 1   No Show: 0    Canceled Appointment: 0     OBJECTIVE:   Short Term Goals - Time Frame for Short term goals: 3 weeks     Goals Current/Discharge status  Met   Short term goal 1: The pt will demonstrate improved postural awareness requiring <25% VC's with exercises  poor [] yes  [x] no     Long Term Goals - Time Frame for Long term goals : 6 weeks   Goals Current/ Discharge status Met   Long term goal 1: The pt will have a increase in UEFI score >/=10 points in order to increase functional activity tolerance  [] yes  [x] no   Long term goal 2: The pt will demonstrate improved B UE strength >/=4+/5 in order to lift/carry with decreased pain Strength RUE  Comment: 3+/5 elbow flex, shoulder ER 4-/5 shoulder flex, abd 4/5 elbow ext, shoulder IR 4+/5 wrist   Strength LUE  Comment: 4+ to 5/5 grossly  [] yes  [x] no   Long term goal 3: The pt will demo improved  strength >/=15# in order to  objects  Strength Other  Other:  strength with UE extended on 3rd rung L 30# R 15# [] yes  [x] no   Long term goal 4: The pt will demo improved cervical AROM >/=10* in order to increase ease with ADL's AROM LUE (degrees)  LUE General AROM: shoulder flex 109, abd 100, IR L4, ER base of occiput   Spine  Cervical: flex 44, ext 20, SB L 13 R 17, Rot L 48 R 39  [] yes  [x] no   Long term goal 5: The pt will demo improved R shoulder AROM flex/abd>/=110, ER to base of occiput, IR to L4 in order to increase ease with reaching activity  AROM RUE (degrees)  RUE General AROM: shoulder flex 95, abd 93, ER ear, IR iliac crest unable to reach midline [] yes  [x] no      Body structures, Functions, Activity limitations: Decreased functional mobility , Decreased ADL status, Decreased ROM, Decreased strength, Increased Pain  Assessment: Pt presents s/p anterior foraminectomy and SC decompression 3/5/19 with decreased B UE strength R>L, decreased cervical and R UE arom, decreased postural awareness, decreased functional activity tolernace, and increased pain. This impairments currenlty limit his functional abilities to lift, carry, , grasp, push, pull, reach, perform ADL's, and housheold duties without increased pain or limiations. Specific instructions for Next Treatment: provide written hep  Prognosis: Good, Fair  Discharge Recommendations: Continue to assess pending progress    New Education Provided: POC, insurance limitations/guidelines, posture      PLAN: [x] Evaluate and Treat  Frequency/Duration:  Plan  Times per week: 2  Plan weeks: 6  Specific instructions for Next Treatment: provide written hep  Current Treatment Recommendations: Strengthening, ROM, Manual Therapy - Joint Manipulation, Manual Therapy - Soft Tissue Mobilization, Home Exercise Program, Equipment Evaluation, Education, & procurement, Patient/Caregiver Education & Training, Modalities, Safety Education & Training  Plan Comment: pt may only be able to come 1x per week d/t high copay              ? Patient Status:[x] Continue/ Initiate plan of Care    [] Discharge PT. Recommend pt continue with HEP.      [] Additional visits requested, Please re-certify for additional

## 2019-04-30 NOTE — PROGRESS NOTES
[address]  PHYSICAL THERAPY EVALUATION    Date: 2019  Patient Name: Francisco Nicholson       MRN: [id number]   Account: [de-identified]   : 1950  (79 y.o.)   Gender: male   Referring Practitioner: Tasia Hathaway                 Diagnosis: B UE weakness, paresthesias, atrophy s/p anterior foraminectomy and SC decompression 3/5/19  Treatment Diagnosis: decreased B UE strength, decreased cervical and R UE arom, decreased postural awareness, decreased fucntional activity tolernace, and increased pain  Additional Pertinent Hx: R 1st digit amputation         Past Medical History:  has a past medical history of 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 Past Surgical History:   has a past surgical history that includes Spine surgery (, , ) and Colonoscopy (2015). Vital Signs  Patient Currently in Pain: Yes   Pain Screening  Patient Currently in Pain: Yes  Pain Assessment  Pain Assessment: 0-10  Pain Level:  3(Pain ranges from 3-7/10)  Pain Type: Surgical pain;Chronic pain  Pain Location: Neck  Pain Orientation: Right;Left  Pain Onset: On-going  Clinical Progression: Gradually improving  Functional Pain Assessment: Prevents or interferes with all active and some passive activities(lifting, carrying, sleeping, performing self care and household ADL's, opening objects, gripping)  Non-Pharmaceutical Pain Intervention(s): Heat applied     Lives With: Spouse  Type of Home: House  Home Layout: One level  ADL Assistance: Needs assistance  Homemaking Assistance: Needs assistance  Homemaking Responsibilities: Yes  Ambulation Assistance: Independent  Transfer Assistance: Independent  Active : Yes  Occupation: Retired  Type of occupation: [employer]   Leisure & Hobbies: farming   IADL Comments: current functional level 50%; Wife has to assist with bathing and dressing   Additional Comments: R hand dominant      Subjective:  Subjective: Pt presents s/p anterior foraminectomy and SC decompression 3/5/19. Has not had PT or bracing p/o. Pt reports has had progressive mm atrophy and weakness since 2002. Had MRI before christmas which showed SC compression. Had received multiple injections in cervical spine until recent surgery. Denies having dizziness, HA's, changes in speech, swallowing, unexplained weight loss, night pain, LOC. 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 Intermittent N/T into B UE's that has improved since surgery.       Objective:   Strength RUE  Comment: 3+/5 elbow flex, shoulder ER 4-/5 shoulder flex, abd 4/5 elbow ext, shoulder IR 4+/5 wrist   Strength LUE  Comment: 4+ to 5/5 grossly   Strength Other  Other:  strength with UE extended on 3rd rung L 30# R 15#     AROM RUE (degrees)  RUE General AROM: shoulder flex 95, abd 93, ER ear, IR iliac crest unable to reach midline  AROM LUE (degrees)  LUE General AROM: shoulder flex 109, abd 100, IR L4, ER base of occiput     Spine  Cervical: flex 44, ext 20, SB L 13 R 17, Rot L 48 R 39    Observation/Palpation  Posture: Poor  Palpation: significant mm tightness throughotu B cervical parapsinals, UT, LS, sub-occipitals   Observation: severe FHP, sits in L lateral flexion, R shoulder elevation, B scap winging, slight decreased thoracic kyphosis, rounded shoulders   Scar: R anterior neck intact and healed     Exercises:   Exercises  Exercise 1: seated cervical retractions*  Exercise 2: pulleys*  Exercise 3: scap retract*  Exercise 4: putty squeeze*  Exercise 5: bicep curls- consider NMES*  Exercise 6: tricep extension*  Exercise 7: wall slides*   Exercise 8: shoulder flex/abd*   Exercise 9: posture ex*  Exercise 10: chin tucks*  Exercise 11: corner and patient agrees with plan of care. Yes  [x]  No  []   Explain:     Yoselyn Fall Risk Assessment  Risk Factor Scale  Score   History of Falls [] Yes  [x] No 25  0    Secondary Diagnosis [] Yes  [x] No 15  0    Ambulatory Aid [] Furniture  [] Crutches/cane/walker  [x] None/bedrest/wheelchair/nurse 30  15  0    IV/Heparin Lock [] Yes  [x] No 20  0    Gait/Transferring [] Impaired  [] Weak  [x] Normal/bedrest/immobile 20  10  0    Mental Status [] Forgets limitations  [x] Oriented to own ability 15  0       Total: 0     Based on the Assessment score: check the appropriate box. [x]  No intervention needed   Low =   Score of 0-24  []  Use standard prevention interventions Moderate =  Score of 24-44   [] Discuss fall prevention strategies   [] Indicate moderate falls risk on eval  []  Use high risk prevention interventions High = Score of 45 and higher   [] Discuss fall prevention strategies   [] Provide supervision during treatment time    Goals  Short term goals  Time Frame for Short term goals: 3 weeks   Short term goal 1: The pt will demonstrate improved postural awareness requiring <25% VC's with exercises  Long term goals  Time Frame for Long term goals : 6 weeks   Long term goal 1: The pt will have a increase in UEFI score >/=10 points in order to increase functional activity tolerance  Long term goal 2: The pt will demonstrate improved B UE strength >/=4+/5 in order to lift/carry with decreased pain  Long term goal 3: The pt will demo improved  strength >/=15# in order to  objects   Long term goal 4: The pt will demo improved cervical AROM >/=10* in order to increase ease with ADL's  Long term goal 5:  The pt will demo improved R shoulder AROM flex/abd>/=110, ER to base of occiput, IR to L4 in order to increase ease with reaching activity     PT Individual Minutes  Time In: 1400  Time Out: 1453  Minutes: 53  Timed Code Treatment Minutes: 0 Minutes  Procedure Minutes: 53  Electronically signed by Natasha Parkinson Dylan Cowan on 4/30/19 at 3:56 PM

## 2019-04-30 NOTE — PROGRESS NOTES
Mauro Mckeon is a 80 y.o. male smoker, history of spondylosis, remote neck injury, who presents with     Chief Complaint   Patient presents with    Neck Pain     left lateral, latest MRI documents improved cervical stenosis, patient to start physical therapy as soon as possible and looking forward to have 4 more second stage cervical surgeries  Overall, he is very pleased with the outcome. 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  Potassium 03/05/2019 4.2  3.4 - 4.9 mEq/L Final    Comment: Effective:  2/7/2019  New reference range for this analyte has been established.  Chloride 03/05/2019 94* 95 - 107 mEq/L Final    Comment: Effective:  2/7/2019  New reference range for this analyte has been established.  CO2 03/05/2019 23  20 - 31 mEq/L Final    Comment: Effective:  2/7/2019  New reference range for this analyte has been established.  Anion Gap 03/05/2019 15  9 - 15 mEq/L Final    Comment: Effective:  2/7/2019  New reference range for this analyte has been established.  Glucose 03/05/2019 170* 70 - 99 mg/dL Final    Comment: Effective:  2/7/2019  New reference range for this analyte has been established.       BUN 03/05/2019 15  8 - 23 mg/dL Final    CREATININE 03/05/2019 0.79  0.70 - 1.20 mg/dL Final    GFR Non- 03/05/2019 >60.0  >60 Final    Comment: >60 mL/min/1.73m2 EGFR, calc. for ages 25 and older using the  MDRD formula (not corrected for weight), is valid for stable  renal function.  GFR  03/05/2019 >60.0  >60 Final    Comment: >60 mL/min/1.73m2 EGFR, calc. for ages 25 and older using the  MDRD formula (not corrected for weight), is valid for stable  renal function.  Calcium 03/05/2019 8.4* 8.5 - 9.9 mg/dL Final    Comment: Effective:  2/7/2019  New reference range for this analyte has been established.  Total Protein 03/05/2019 6.5  6.3 - 8.0 g/dL Final    Comment: Effective:  2/7/2019  New reference range for this analyte has been established.  Alb 03/05/2019 4.3  3.5 - 4.6 g/dL Final    Comment: Effective:  2/7/2019  New reference range for this analyte has been established.  Total Bilirubin 03/05/2019 0.3  0.2 - 0.7 mg/dL Final    Comment: Effective:  2/7/2019  New reference range for this analyte has been established.       Alkaline Phosphatase 03/05/2019 65  35 - 104 U/L Final    ALT 03/05/2019 25  0 - 41 U/L Final    AST 03/05/2019 26  0 - 40 U/L Final    Globulin 03/05/2019 2.2* 2.3 - 3.5 g/dL Final    WBC 03/05/2019 14.0* 4.8 - 10.8 K/uL Final    RBC 03/05/2019 4.19* 4.70 - 6.10 M/uL Final    Hemoglobin 03/05/2019 13.9* 14.0 - 18.0 g/dL Final    Hematocrit 03/05/2019 41.0* 42.0 - 52.0 % Final    MCV 03/05/2019 97.7  80.0 - 100.0 fL Final    MCH 03/05/2019 33.1* 27.0 - 31.3 pg Final    MCHC 03/05/2019 33.9  33.0 - 37.0 % Final    RDW 03/05/2019 13.1  11.5 - 14.5 % Final    Platelets 54/34/2778 253  130 - 400 K/uL Final    Neutrophils % 03/05/2019 94.9  % Final    Lymphocytes % 03/05/2019 3.8  % Final    Monocytes % 03/05/2019 0.8  % Final    Eosinophils % 03/05/2019 0.4  % Final    Basophils % 03/05/2019 0.1  % Final    Neutrophils # 03/05/2019 13.3* 1.4 - 6.5 K/uL Final    Lymphocytes # 03/05/2019 0.5* 1.0 - 4.8 K/uL Final    Monocytes # 03/05/2019 0.1* 0.2 - 0.8 K/uL Final    Eosinophils # 03/05/2019 0.1  0.0 - 0.7 K/uL Final    Basophils # 03/05/2019 0.0  0.0 - 0.2 K/uL Final    Lactic Acid 03/05/2019 4.5* 0.5 - 2.2 mmol/L Final    Magnesium 03/05/2019 1.7  1.7 - 2.4 mg/dL Final    Comment: Effective:  2/7/2019  New reference range for this analyte has been established.  Troponin 03/05/2019 <0.010  0.000 - 0.010 ng/mL Final    Methodology by Troponin T.    Total CK 03/05/2019 250* 0 - 190 U/L Final    CK-MB 03/05/2019 3.2  0.0 - 6.7 ng/mL Final    CK-MB Index 03/05/2019 1.3  0.0 - 3.5 % Final   Hospital Outpatient Visit on 02/18/2019   Component Date Value Ref Range Status    Ventricular Rate 02/18/2019 75  BPM Final    Atrial Rate 02/18/2019 75  BPM Final    P-R Interval 02/18/2019 170  ms Final    QRS Duration 02/18/2019 90  ms Final    Q-T Interval 02/18/2019 404  ms Final    QTc Calculation (Bazett) 02/18/2019 451  ms Final    P Axis 02/18/2019 83  degrees Final    R Axis 02/18/2019 124  degrees Final    T Axis 02/18/2019 72  degrees Final   Orders Only on 02/18/2019   Component Date Value Ref Range Status    Sodium 02/18/2019 138  135 - 144 mEq/L Final    Comment: Effective:  2/7/2019  New reference range for this analyte has been established.  Potassium 02/18/2019 4.0  3.4 - 4.9 mEq/L Final    Comment: Effective:  2/7/2019  New reference range for this analyte has been established.  Chloride 02/18/2019 101  95 - 107 mEq/L Final    Comment: Effective:  2/7/2019  New reference range for this analyte has been established.  CO2 02/18/2019 28  20 - 31 mEq/L Final    Comment: Effective:  2/7/2019  New reference range for this analyte has been established.  Anion Gap 02/18/2019 9  9 - 15 mEq/L Final    Comment: Effective:  2/7/2019  New reference range for this analyte has been established.  Glucose 02/18/2019 91  70 - 99 mg/dL Final    Comment: Effective:  2/7/2019  New reference range for this analyte has been established.       BUN 02/18/2019 14  8 - 23 mg/dL Final    CREATININE 02/18/2019 0.81  0.70 - 1.20 mg/dL Final    GFR Non- 02/18/2019 >60.0  >60 Final Comment: >60 mL/min/1.73m2 EGFR, calc. for ages 25 and older using the  MDRD formula (not corrected for weight), is valid for stable  renal function.  GFR  02/18/2019 >60.0  >60 Final    Comment: >60 mL/min/1.73m2 EGFR, calc. for ages 25 and older using the  MDRD formula (not corrected for weight), is valid for stable  renal function.  Calcium 02/18/2019 9.0  8.5 - 9.9 mg/dL Final    Comment: Effective:  2/7/2019  New reference range for this analyte has been established. Orders Only on 02/18/2019   Component Date Value Ref Range Status    aPTT 02/18/2019 24.8  21.6 - 35.4 sec Final    Comment: Effective 12/4/18:  Please note reference ranges have  changed for PT testing. Orders Only on 02/18/2019   Component Date Value Ref Range Status    Protime 02/18/2019 10.6  9.0 - 11.5 sec Final    Comment: Effective 12/4/18:  Please note reference ranges have  changed for PT testing.  INR 02/18/2019 1.0   Final    Comment: Recommended INR therapeutic ranges for oral anticoagulant  therapy    Prophylaxis/treatment of:                       INR     Venous Thrombosis, Pulmonary Embolism       2.0-3  Prevention of Systemic Embolism from:     Atrial Fibrillation                         2.0-3.0     Myocardial Infarction                       2.0-3.0     Mechanical Prosthetics Heart Valves         2.5-3.5     Recurrent Systemic Embolism                 2.5-3.5  Guidelines for patients with coagulopathy, e.g. liver disease:  Use the Protime resulted in seconds.     Mild        12.9-17.0 sec    Moderate    17.1-22.6 sec    Severe      G.T. 22.6 sec     Orders Only on 02/18/2019   Component Date Value Ref Range Status    WBC 02/18/2019 7.1  4.8 - 10.8 K/uL Final    RBC 02/18/2019 4.45* 4.70 - 6.10 M/uL Final    Hemoglobin 02/18/2019 15.0  14.0 - 18.0 g/dL Final    Hematocrit 02/18/2019 43.7  42.0 - 52.0 % Final    MCV 02/18/2019 98.2  80.0 - 100.0 fL Final    MCH 02/18/2019 33.6* 27.0 - 31.3 pg Final    MCHC 02/18/2019 34.2  33.0 - 37.0 % Final    RDW 02/18/2019 13.0  11.5 - 14.5 % Final    Platelets 57/82/4309 317  130 - 400 K/uL Final    Neutrophils % 02/18/2019 55.7  % Final    Lymphocytes % 02/18/2019 27.8  % Final    Monocytes % 02/18/2019 8.4  % Final    Eosinophils % 02/18/2019 6.9  % Final    Basophils % 02/18/2019 1.2  % Final    Neutrophils # 02/18/2019 4.0  1.4 - 6.5 K/uL Final    Lymphocytes # 02/18/2019 2.0  1.0 - 4.8 K/uL Final    Monocytes # 02/18/2019 0.6  0.2 - 0.8 K/uL Final    Eosinophils # 02/18/2019 0.5  0.0 - 0.7 K/uL Final    Basophils # 02/18/2019 0.1  0.0 - 0.2 K/uL Final       HPI:    Neck Pain    This is a new problem. The current episode started in the past 7 days. The problem occurs intermittently. The problem has been unchanged. The pain is associated with an unknown factor. The pain is present in the left side. The quality of the pain is described as aching and cramping. The pain is mild. The symptoms are aggravated by position. The pain is same all the time. Associated symptoms include weakness (of the right arm). Pertinent negatives include no chest pain, headaches, numbness, pain with swallowing, tingling or trouble swallowing. He has tried home exercises for the symptoms. The treatment provided no relief. More detail above in the chiefcomplaint(s), interim history and below in the review of systems.      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   [place], ,     [facility], Dr Alia Perry [facility])       Social History     Socioeconomic History    Marital status:      Spouse name: Not on file    Number of children: 1    Years of education: Not on file    Highest education level: Not on file   Occupational History    Occupation: retired [employer]   Social Needs    Financial resource strain: Not on file    Food insecurity:     Worry: Not on file     Inability: Not on file   [employer] needs:     Medical: Not on file     Non-medical: Not on file   Tobacco Use    Smoking status: Current Some Day Smoker     Packs/day: 0.15     Years: 40.00     Pack years: 6.00     Types: Cigarettes     Last attempt to quit: 3/1/2019     Years since quittin.1    Smokeless tobacco: Never Used    Tobacco comment: working on cutting back, will try the electronic cigarette   Substance and Sexual Activity    Alcohol use: No    Drug use: No    Sexual activity: Not on file   Lifestyle    Physical activity:     Days per week: Not on file     Minutes per session: Not on file    Stress: Not on file   Relationships    Social connections:     Talks on phone: Not on file     Gets together: Not on file     Attends Yazidism service: Not on file     Active member of club or organization: Not on file     Attends meetings of clubs or organizations: Not on file     Relationship status: Not on file    Intimate partner violence:     Fear of current or ex partner: Not on file     Emotionally abused: Not on file     Physically abused: Not on file     Forced sexual activity: Not on file   Other Topics Concern    Not on file   Social History Narrative    Born in [facility], 1 brother and 3 sisters, keeps in touch with brother     Gilma Floyd came from [place]     , worked at [employer] x 43 years, got injured in         Lives at home with spouse, one son, keeps in touch    (Was on workman's comp till 2012)    [address] housework, 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[address]), travel (has been all over the [place])-stopped 2007       Family History   Problem Relation Age of Onset    Heart Attack Mother         dec 79    Cancer Father         lung, dec 80       Family and socialhistory were reviewed and pertinent changes documented. ALLERGIES    Patient has no known allergies. Current Outpatient Medications on File Prior to Visit   Medication Sig Dispense Refill    Iodoquinol-Hydrocortisone-Aloe 1-1.9 % CREA Apply to the affected areas 1-2 times daily as needed.  aspirin 81 MG EC tablet Take 81 mg by mouth daily.  amitriptyline (ELAVIL) 10 MG tablet Take 1 tab po daily at HS 90 tablet 1     No current facility-administered medications on file prior to visit. Review of Systems   Constitutional: Negative for appetite change, fatigue and unexpected weight change. HENT: Negative for nosebleeds and trouble swallowing. Respiratory: Negative for cough and chest tightness. Cardiovascular: Negative for chest pain. Gastrointestinal: Negative for abdominal pain. Endocrine: Negative for cold intolerance and heat intolerance. Genitourinary: Negative for difficulty urinating and hematuria. Musculoskeletal: Positive for neck pain and neck stiffness. Skin: Negative for rash and wound. Neurological: Positive for weakness (of the right arm). Negative for dizziness, tingling, tremors, speech difficulty, light-headedness, numbness and headaches. Hematological: Does not bruise/bleed easily. Psychiatric/Behavioral: Negative for decreased concentration, dysphoric mood and sleep disturbance. The patient is nervous/anxious.         Vitals:    04/30/19 1210   BP: 102/65   Site: Left Upper Arm   Position: Sitting   Cuff Size: Medium Adult   Pulse: 80   Resp: 14   Temp: 98.2 °F (36.8 °C)   TempSrc: Tympanic   SpO2: 100%   Weight: 149 lb 12.8 oz (67.9 kg)   Height: 5' 6.5\" (1.689 m)       Physical Exam   Constitutional: He is oriented to person, place, and time. He appears well-nourished. He appears distressed (anxious). HENT:   Head: Atraumatic. Eyes: EOM are normal. Right eye exhibits no discharge. Left eye exhibits no discharge. Right conjunctiva is injected. Right conjunctiva has no hemorrhage. Left conjunctiva is not injected. Left conjunctiva has no hemorrhage. Right eye exhibits normal extraocular motion. Left eye exhibits normal extraocular motion. Pupils are unequal (right pupil is smaller). The appearance of the right sided changes of Kailash syndrome remain the same  The vision is intact in both eyes    Neck: No tracheal deviation present. No thyromegaly present. The surgical incision of the mid right side of the neck is healing well  Palpation of the neck muscles reveals mild tenderness and spasm with palpation of the belly of the left sternocleidomastoid muscle. Cardiovascular: Regular rhythm, normal heart sounds and intact distal pulses. Pulmonary/Chest: Effort normal and breath sounds normal.   Abdominal: He exhibits no distension. Lymphadenopathy:     He has no cervical adenopathy. Neurological: He is alert and oriented to person, place, and time. He has normal strength. He exhibits normal muscle tone. Coordination and gait normal.   Skin: Skin is warm. Psychiatric: His behavior is normal. Judgment normal. His mood appears anxious. His affect is not labile. His speech is rapid and/or pressured. Thought content is not delusional. Cognition and memory are normal. He does not exhibit a depressed mood. Good insight and motivation       Assessment:    Kenya Jamil was seen today for neck pain. Diagnoses and all orders for this visit:    Strain of sternocleidomastoid muscle, initial encounter             The patient declines any medication such as anti-inflammatories.   Muscle relaxants probably not indicated in the setting of recent surgery. Will use heating pad, start PT as per neurosurgeon's prescription. Plan:    Reviewed with the patient (/and caregiver if present): current health status, medications, activities and diet. See also orders and comments in the assessment section. Today's diagnosis (in the context ofchronic problems) was discussed with patient (/and caregiver if present), questions answered. The current medical regimen is effective;  continue present plan and medications. Close follow up needed to evaluate treatment results and for care coordination. Return if symptoms worsen or fail to improve. I have reviewed the patient's medical and surgical, family and social history, health maintenance schedule, and updated the computerized patient record. Please note this report has been partially produced by using speech recognition hardware. It may contain errors related to the system, including grammar, punctuation and spelling as well as words and phrases that may seem inaccurate. For anyquestions or concerns, please feel free to contact me for clarification.         Electronically signed by Kiki Howe MD

## 2019-05-14 ENCOUNTER — HOSPITAL ENCOUNTER (OUTPATIENT)
Dept: PHYSICAL THERAPY | Age: 69
Setting detail: THERAPIES SERIES
Discharge: HOME | End: 2019-05-14
Payer: MEDICARE

## 2019-05-14 PROCEDURE — 97110 THERAPEUTIC EXERCISES: CPT

## 2019-05-14 PROCEDURE — 97112 NEUROMUSCULAR REEDUCATION: CPT

## 2019-05-14 ASSESSMENT — PAIN DESCRIPTION - LOCATION: LOCATION: HAND;ARM

## 2019-05-14 ASSESSMENT — PAIN DESCRIPTION - DESCRIPTORS: DESCRIPTORS: ACHING

## 2019-05-14 ASSESSMENT — PAIN DESCRIPTION - ORIENTATION: ORIENTATION: RIGHT;LEFT

## 2019-05-14 ASSESSMENT — PAIN SCALES - GENERAL: PAINLEVEL_OUTOF10: 7

## 2019-05-14 ASSESSMENT — PAIN DESCRIPTION - PAIN TYPE: TYPE: CHRONIC PAIN

## 2019-05-14 ASSESSMENT — PAIN DESCRIPTION - FREQUENCY: FREQUENCY: CONTINUOUS

## 2019-05-14 NOTE — PROGRESS NOTES
[address] [address]  Outpatient Physical Therapy    Treatment Note        Date: 2019  Patient: Cristy Cardenas  : 1950  ACCT #: [de-identified]  Referring Practitioner: Evelyn Ramirez  Diagnosis: B UE weakness, paresthesias, atrophy s/p anterior foraminectomy and SC decompression 3/5/19    Visit Information:  PT Visit Information  PT Insurance Information: BCBS Medicare   Total # of Visits Approved: 12  Total # of Visits to Date: 2  Plan of Care/Certification Expiration Date: 19  No Show: 0  Progress Note Due Date: 19  Canceled Appointment: 0  Progress Note Counter:  (27-3/27/53) Recert due     Subjective: Pt brought in MRI results to discuss with therapist. Denies pain in neck this date, pain primarily in UE's and hands. Has f/u with surgeon in [place] tomorrow.       HEP Compliance:  [] Good [] Fair [] Poor [x] Reports not doing due to: not yet issued     Vital Signs  Patient Currently in Pain: Yes   Pain Screening  Patient Currently in Pain: Yes  Pain Assessment  Pain Assessment: 0-10  Pain Level: 7  Pain Type: Chronic pain  Pain Location: Hand;Arm  Pain Orientation: Right;Left  Pain Descriptors: Aching  Pain Frequency: Continuous    OBJECTIVE:   Exercises  Exercise 1: seated cervical retractions 5\"x10   Exercise 2: pulleys x 4 min   Exercise 3: scap retract 5\"x10   Exercise 4: putty squeeze x2 min B UE's relzxed on knees   Exercise 5: bicep curls- consider NMES*  Exercise 6: tricep extension*  Exercise 7: wall slides*   Exercise 8: shoulder flex/abd*   Exercise 9: posture ex- shrugs, rolls x10   Exercise 10: chin tucks*  Exercise 11: corner stretch- unable to get in position; started standing posture with B UE ER and scap retraction hold 5\"x10    Exercise 19: Education x18 mins   Exercise 20: HEP: scap retract, cerv retract,shrugs/rolls, putty squeeze     Strength: [x] NT  [] MMT completed:    ROM: [x] NT  [] ROM measurements:  *Indicates exercise, modality, or manual techniques to be initiated when appropriate    Assessment: Body structures, Functions, Activity limitations: Decreased functional mobility , Decreased ADL status, Decreased ROM, Decreased strength, Increased Pain  Assessment: Pt required frequent VC's for posture throughout ex's with significant fatigue noted d/t mm weakness. VC's to perform cervical retractions in non-pain producing rom. Pt unable to perform corner stretch d/t inability to ER UE's and assume position, improved tolerance to standing shoulder ER focusing on poostural awareness. Treatment Diagnosis: decreased B UE strength, decreased cervical and R UE arom, decreased postural awareness, decreased fucntional activity tolernace, and increased pain  Prognosis: Good, Fair  Patient Education: provided written updates for hep; extensive eductaion on MRI and anatomy of condition, surgery, posture, and expectations/outcomes from PT     Goals:  Short term goals  Time Frame for Short term goals: 3 weeks   Short term goal 1: The pt will demonstrate improved postural awareness requiring <25% VC's with exercises    Long term goals  Time Frame for Long term goals : 6 weeks   Long term goal 1: The pt will have a increase in UEFI score >/=10 points in order to increase functional activity tolerance  Long term goal 2: The pt will demonstrate improved B UE strength >/=4+/5 in order to lift/carry with decreased pain  Long term goal 3: The pt will demo improved  strength >/=15# in order to  objects   Long term goal 4: The pt will demo improved cervical AROM >/=10* in order to increase ease with ADL's  Long term goal 5: The pt will demo improved R shoulder AROM flex/abd>/=110, ER to base of occiput, IR to L4 in order to increase ease with reaching activity   Progress toward goals: to be determined     POST-PAIN       Pain Rating (0-10 pain scale):  same /10   Location and pain description same as pre-treatment unless indicated.    Action: [] NA   [x] Perform

## 2019-05-21 ENCOUNTER — HOSPITAL ENCOUNTER (OUTPATIENT)
Dept: PHYSICAL THERAPY | Age: 69
Setting detail: THERAPIES SERIES
Discharge: HOME | End: 2019-05-21
Payer: MEDICARE

## 2019-05-21 PROCEDURE — 97110 THERAPEUTIC EXERCISES: CPT

## 2019-05-21 PROCEDURE — 97140 MANUAL THERAPY 1/> REGIONS: CPT

## 2019-05-21 ASSESSMENT — PAIN DESCRIPTION - FREQUENCY: FREQUENCY: CONTINUOUS

## 2019-05-21 ASSESSMENT — PAIN DESCRIPTION - PAIN TYPE: TYPE: CHRONIC PAIN

## 2019-05-21 ASSESSMENT — PAIN SCALES - GENERAL: PAINLEVEL_OUTOF10: 6

## 2019-05-21 ASSESSMENT — PAIN DESCRIPTION - LOCATION: LOCATION: HAND

## 2019-05-21 ASSESSMENT — PAIN DESCRIPTION - ORIENTATION: ORIENTATION: LEFT;RIGHT

## 2019-05-21 NOTE — PROGRESS NOTES
[address] [address]  Outpatient Physical Therapy    Treatment Note        Date: 2019  Patient: Heath Araiza  : 1950  ACCT #: [de-identified]  Referring Practitioner: Mindy Baumann  Diagnosis: B UE weakness, paresthesias, atrophy s/p anterior foraminectomy and SC decompression 3/5/19    Visit Information:  PT Visit Information  PT Insurance Information: BCBS Medicare   Total # of Visits Approved: 12  Total # of Visits to Date: 3  Plan of Care/Certification Expiration Date: 19  No Show: 0  Canceled Appointment: 1  Progress Note Counter: 3/12 (-) Recert due     Subjective: Pt reports slept wrong has neck pain but not willing to report level. 6/10 pain B hands  Comments: Going for neck surgery , Cx appt   HEP Compliance:  [x] Good [] Fair [] Poor [] Reports not doing due to:    Vital Signs  Patient Currently in Pain: Yes   Pain Screening  Patient Currently in Pain: Yes  Pain Assessment  Pain Level: 6  Pain Type: Chronic pain  Pain Location: Hand  Pain Orientation: Left;Right  Pain Frequency: Continuous    OBJECTIVE:   Exercises  Exercise 1: seated cervical retractions 5\"x10   Exercise 2: pulleys x 3 min   Exercise 3: scap retract 5\"x15  Exercise 4: putty squeeze x2 min B UE's relzxed on knees   Exercise 7: wall slides Flex abd 5 sec x 5  Exercise 9: posture ex- shrugs x 10, rolls x15  Exercise 10: chin tucks 5 sec x 10        Manual:   Manual therapy  Soft Tissue Mobalization: B UT, cervical parapsinals, LS, suboccipitals x 11 minutes      *Indicates exercise, modality, or manual techniques to be initiated when appropriate    Assessment: Body structures, Functions, Activity limitations: Decreased functional mobility , Decreased ADL status, Decreased ROM, Decreased strength, Increased Pain  Assessment: Pt declined increased reps most exercises and putty ex stated \" not going to do anything to bother my hands. Declined trying ENMES to right bicep for same reason. Moderate tightness B cervical and scapular paraspinals R>L, decreased with manual techniques. Good tolerance to treatment. Treatment Diagnosis: decreased B UE strength, decreased cervical and R UE arom, decreased postural awareness, decreased fucntional activity tolernace, and increased pain  Prognosis: Good, Fair  Patient Education: Continue with home exercises and increase as directed. Goals:  Short term goals  Time Frame for Short term goals: 3 weeks   Short term goal 1: The pt will demonstrate improved postural awareness requiring <25% VC's with exercises    Long term goals  Time Frame for Long term goals : 6 weeks   Long term goal 1: The pt will have a increase in UEFI score >/=10 points in order to increase functional activity tolerance  Long term goal 2: The pt will demonstrate improved B UE strength >/=4+/5 in order to lift/carry with decreased pain  Long term goal 3: The pt will demo improved  strength >/=15# in order to  objects   Long term goal 4: The pt will demo improved cervical AROM >/=10* in order to increase ease with ADL's  Long term goal 5: The pt will demo improved R shoulder AROM flex/abd>/=110, ER to base of occiput, IR to L4 in order to increase ease with reaching activity   Progress toward goals: LTG 2-4. POST-PAIN       Pain Rating (0-10 pain scale):  \"Looser\" /10   Location and pain description same as pre-treatment unless indicated. Action: [] NA   [x] Perform HEP  [] Meds as prescribed  [] Modalities as prescribed   [] Call Physician     Frequency/Duration:  Plan  Times per week: 2  Plan weeks: 6  Pt to be placed on hold  For neck surgery 5/22/19. Will need new order to resume therapy.   Current Treatment Recommendations: Strengthening, ROM, Manual Therapy - Joint Manipulation, Manual Therapy - Soft Tissue Mobilization, Home Exercise Program, Equipment Evaluation, Education, & procurement, Patient/Caregiver Education & Training, Modalities, Safety Education &

## 2019-05-21 NOTE — PROGRESS NOTES
[address]       Physical Therapy  Cancellation/No-show Note  Patient Name:  Mauro Mckeon  :  1950   Date:  2019  Referring Practitioner: Gurpreet Crowe  Diagnosis: B UE weakness, paresthesias, atrophy s/p anterior foraminectomy and SC decompression 3/5/19    Visit Information:  PT Visit Information  PT Insurance Information: BCBS Medicare   Total # of Visits Approved: 12  Total # of Visits to Date: 3  Plan of Care/Certification Expiration Date: 19  No Show: 0  Canceled Appointment: 1  Progress Note Counter: 3/12 (-92) Recert due     For today's appointment patient:  [x]  Cancelled  []  Rescheduled appointment  []  No-show   []  Called pt to remind of next appointment     Reason given by patient:  []  Patient ill  []  Conflicting appointment  []  No transportation    []  Conflict with work  []  No reason given  [x]  Other:  Having surgery , cx  appt, pt aware of needing orders to resume PT if Dr wanting him to continue      Comments:       Signature: Electronically signed by Dominick Louise PT on 19 at 10:55 AM

## 2019-05-23 ENCOUNTER — HOSPITAL ENCOUNTER (OUTPATIENT)
Dept: PHYSICAL THERAPY | Age: 69
Setting detail: THERAPIES SERIES
End: 2019-05-23
Payer: MEDICARE

## 2019-05-31 ENCOUNTER — HOSPITAL ENCOUNTER (OUTPATIENT)
Dept: PHYSICAL THERAPY | Age: 69
Setting detail: THERAPIES SERIES
Discharge: HOME | End: 2019-05-31
Payer: MEDICARE

## 2019-06-04 ENCOUNTER — APPOINTMENT (OUTPATIENT)
Dept: PHYSICAL THERAPY | Age: 69
End: 2019-06-04
Payer: MEDICARE

## 2019-06-07 ENCOUNTER — APPOINTMENT (OUTPATIENT)
Dept: PHYSICAL THERAPY | Age: 69
End: 2019-06-07
Payer: MEDICARE

## 2019-06-11 ENCOUNTER — APPOINTMENT (OUTPATIENT)
Dept: PHYSICAL THERAPY | Age: 69
End: 2019-06-11
Payer: MEDICARE

## 2019-06-14 ENCOUNTER — APPOINTMENT (OUTPATIENT)
Dept: PHYSICAL THERAPY | Age: 69
End: 2019-06-14
Payer: MEDICARE

## 2019-06-18 ENCOUNTER — HOSPITAL ENCOUNTER (OUTPATIENT)
Dept: PHYSICAL THERAPY | Age: 69
Setting detail: THERAPIES SERIES
End: 2019-06-18
Payer: MEDICARE

## 2019-06-21 ENCOUNTER — APPOINTMENT (OUTPATIENT)
Dept: PHYSICAL THERAPY | Age: 69
End: 2019-06-21
Payer: MEDICARE

## 2019-06-25 ENCOUNTER — HOSPITAL ENCOUNTER (OUTPATIENT)
Dept: PHYSICAL THERAPY | Age: 69
Setting detail: THERAPIES SERIES
Discharge: HOME | End: 2019-06-25
Payer: MEDICARE

## 2019-06-25 PROCEDURE — 97140 MANUAL THERAPY 1/> REGIONS: CPT

## 2019-06-25 PROCEDURE — 97110 THERAPEUTIC EXERCISES: CPT

## 2019-06-25 PROCEDURE — 97112 NEUROMUSCULAR REEDUCATION: CPT

## 2019-06-25 ASSESSMENT — PAIN DESCRIPTION - PAIN TYPE: TYPE: CHRONIC PAIN

## 2019-06-25 ASSESSMENT — PAIN DESCRIPTION - DESCRIPTORS: DESCRIPTORS: ACHING;SHARP;CONSTANT

## 2019-06-25 ASSESSMENT — PAIN - FUNCTIONAL ASSESSMENT: PAIN_FUNCTIONAL_ASSESSMENT: PREVENTS OR INTERFERES WITH ALL ACTIVE AND SOME PASSIVE ACTIVITIES

## 2019-06-25 ASSESSMENT — PAIN DESCRIPTION - FREQUENCY: FREQUENCY: CONTINUOUS

## 2019-06-25 ASSESSMENT — PAIN DESCRIPTION - ONSET: ONSET: ON-GOING

## 2019-06-25 ASSESSMENT — PAIN DESCRIPTION - LOCATION: LOCATION: HAND

## 2019-06-25 ASSESSMENT — PAIN SCALES - GENERAL: PAINLEVEL_OUTOF10: 2

## 2019-06-25 ASSESSMENT — PAIN DESCRIPTION - ORIENTATION: ORIENTATION: RIGHT;LEFT

## 2019-06-25 NOTE — PROGRESS NOTES
[address]     Ph: [phone]  Fax: [phone]    [] Certification  [] Recertification []  Plan of Care  [x] Progress Note [] Discharge      To:  Referring Practitioner: Moustapha Lou;  Rossy Dwyer From:  Michael Flores, PT , DPTPatient: Maris Castorena     : 1950  Diagnosis: B UE weakness, paresthesias, atrophy s/p anterior foraminectomy and SC decompression 3/5/19; cervical decompression s/p anterior foraminotomy      Date: 2019  Treatment Diagnosis: decreased B UE strength, decreased cervical and R UE arom, decreased postural awareness, decreased fucntional activity tolernace, and increased pain  Plan of Care/Certification Expiration Date: 19  Progress Report Period from:  2019  to 2019    Total # of Visits to Date: 4   No Show: 0    Canceled Appointment: 3     OBJECTIVE:   Short Term Goals - Time Frame for Short term goals: 3 weeks     Goals Current/Discharge status  Met   Short term goal 1: The pt will demonstrate improved postural awareness requiring <25% VC's with exercises  Improving with contd deficits  [] yes  [x] no     Long Term Goals - Time Frame for Long term goals : 6 weeks   Goals Current/ Discharge status Met   Long term goal 1: The pt will have a increase in UEFI score >/=10 points in order to increase functional activity tolerance  [] yes  [x] no   Long term goal 2: The pt will demonstrate improved B UE strength >/=4+/5 in order to lift/carry with decreased pain   Strength RUE  Comment: 4-/5 except elbow 4/5 wrist 4+/5   Strength LUE  Comment: 4+/5 elbow, Shoulder IR, ER 4/5 shoulder flex, abd  [] yes  [x] no   Long term goal 3: The pt will demo improved  strength >/=15# in order to  objects  Strength Other  Other:  strength with UE extended on 3rd rung L 25# R 40# [] yes  [x] no   Long term goal 4: The pt will demo improved cervical AROM >/=10* in order to increase ease with ADL's Spine  Cervical: flex 35, ext 27, SB L 14 R 10, Rot L 40 R 41 [] yes  [x] no   Long term goal 5: The pt will demo improved R shoulder AROM flex/abd>/=110, ER to base of occiput, IR to L4 in order to increase ease with reaching activity  AROM RUE (degrees)  RUE General AROM: shoulder flex 97, abd 80, ER ear level with inability to touch head, IR PSIS  [] yes  [x] no      Body structures, Functions, Activity limitations: Decreased functional mobility , Decreased ADL status, Decreased ROM, Decreased strength, Increased Pain  Assessment: Pt presents after lapse in tx d/t cervical stenosis s/p anterior foraminotomy 5/22/19. Pt with contd B UE weakness and slight decreased cervical and R shoulder arom since initial evaluation possibly d/t lapse in tx and recent surgery. Specific instructions for Next Treatment: reinitiate POC   Prognosis: Good, Fair  Discharge Recommendations: Continue to assess pending progress    New Education Provided: Reinitiate HEP; significant education on posture, positioning, anatomy of surgery and impairments as well as evaluative findings, POC, and goals     PLAN: [x] Evaluate and Treat  Frequency/Duration:  Plan  Times per week: 2-3  Plan weeks: 6  Specific instructions for Next Treatment: reinitiate POC   Current Treatment Recommendations: Strengthening, ROM, Manual Therapy - Joint Manipulation, Manual Therapy - Soft Tissue Mobilization, Home Exercise Program, Equipment Evaluation, Education, & procurement, Patient/Caregiver Education & Training, Modalities, Safety Education & Training  Plan Comment: pt may only be able to come 1x per week d/t high copay      Precautions: lifting restrictions per pt              ? Patient Status:[x] Continue/ Initiate plan of Care    [] Discharge PT. Recommend pt continue with HEP.      [] Additional visits requested, Please re-certify for additional visits:        Signature: Electronically signed by Adrienne Diaz PT on 6/25/19 at 10:16 AM    If you have any questions or concerns, please don't hesitate to call. Thank you for your referral.    I have reviewed this plan of care and certify a need for medically necessary rehabilitation services.     Physician Signature:__________________________________________________________  Date:  Please sign and return

## 2019-06-25 NOTE — PROGRESS NOTES
[address] [address]  Outpatient Physical Therapy    Treatment Note        Date: 2019  Patient: Adela Mcclelland  : 1950  ACCT #: [de-identified]  Referring Practitioner: Jose A Dwyer  Diagnosis: B UE weakness, paresthesias, atrophy s/p anterior foraminectomy and SC decompression 3/5/19; cervical decompression s/p anterior foraminotomy     Visit Information:  PT Visit Information  PT Insurance Information: [facility] Medicare   Total # of Visits Approved: 12  Total # of Visits to Date: 4  Plan of Care/Certification Expiration Date: 19  No Show: 0  Canceled Appointment: 3  Progress Note Counter:  (19-19)     Subjective: Pt reports 5/15/19 went for F/U with MD and had emergency cspine surgery 19 and was placed on hold by MD until 19. L C5-T1. Was not given a cervical collar, was placed on restrcitions of light duty x2 weeks, no lifting >2#, pt reports poor compliance with following restrictions. Pt reports pain increases to 8/10 with making B fists to grab objects L>R. Pt currently requires assist for most ADL's and opening objects. Comments: RTD possibly next week   HEP Compliance:  [] Good [x] Fair [] Poor [] Reports not doing due to:    Vital Signs  Patient Currently in Pain: Yes   Pain Screening  Patient Currently in Pain: Yes  Pain Assessment  Pain Assessment: 0-10  Pain Level: 2(pain ranges from 1-8/10 in B hands; 2-8/10 pain in neck  )  Pain Type: Chronic pain  Pain Location: Hand  Pain Orientation: Right;Left  Pain Descriptors: Aching; Hollnis Kappa; Constant  Pain Frequency: Continuous  Pain Onset: On-going  Functional Pain Assessment: Prevents or interferes with all active and some passive activities(sleeping, carrying objects, gripping activites, reaching )    OBJECTIVE:   Exercises  Exercise 1: seated cervical retractions 5\"x10   Exercise 2: pulleys*  Exercise 3: scap retract 5\"x10  Exercise 5: bicep curls*  Exercise 6: tricep extension*  Exercise 7: wall slides Flex abd 5 sec x 5  Exercise 8: shoulder flex/abd*   Exercise 9: posture ex- shrugs x 10, rolls x15  Exercise 10: chin tucks*  Exercise 11: corner stretch*  Exercise 18: objective meausres x15 min   Exercise 19: Eductaion x25 mins  Exercise 20: HEP: cont current     Strength: [] NT  [x] MMT completed:  Strength RUE  Comment: 4-/5 except elbow 4/5 wrist 4+/5   Strength LUE  Comment: 4+/5 elbow, Shoulder IR, ER 4/5 shoulder flex, abd   Strength Other  Other:  strength with UE extended on 3rd rung L 25# R 40#    ROM: [] NT  [x] ROM measurements:  AROM RUE (degrees)  RUE General AROM: shoulder flex 97, abd 80, ER ear level with inability to touch head, IR PSIS      Spine  Cervical: flex 35, ext 27, SB L 14 R 10, Rot L 40 R 41    *Indicates exercise, modality, or manual techniques to be initiated when appropriate    Assessment: Body structures, Functions, Activity limitations: Decreased functional mobility , Decreased ADL status, Decreased ROM, Decreased strength, Increased Pain  Assessment: Pt presents after lapse in tx d/t cervical stenosis s/p anterior foraminotomy 5/22/19. Pt with contd B UE weakness and slight decreased cervical and R shoulder arom since initial evaluation possibly d/t lapse in tx and recent surgery.    Treatment Diagnosis: decreased B UE strength, decreased cervical and R UE arom, decreased postural awareness, decreased fucntional activity tolernace, and increased pain  Prognosis: Good, Fair  Patient Education: Reinitiate HEP; significant education on posture, positioning, anatomy of surgery and impairments as well as evaluative findings, POC, and goals     Goals:  Short term goals  Time Frame for Short term goals: 3 weeks   Short term goal 1: The pt will demonstrate improved postural awareness requiring <25% VC's with exercises    Long term goals  Time Frame for Long term goals : 6 weeks   Long term goal 1: The pt will have a increase in UEFI score >/=10 points in order to increase functional activity tolerance  Long term goal 2: The pt will demonstrate improved B UE strength >/=4+/5 in order to lift/carry with decreased pain  Long term goal 3: The pt will demo improved  strength >/=15# in order to  objects   Long term goal 4: The pt will demo improved cervical AROM >/=10* in order to increase ease with ADL's  Long term goal 5: The pt will demo improved R shoulder AROM flex/abd>/=110, ER to base of occiput, IR to L4 in order to increase ease with reaching activity   Progress toward goals:see PN    POST-PAIN       Pain Rating (0-10 pain scale): same  /10   Location and pain description same as pre-treatment unless indicated. Action: [] NA   [x] Perform HEP  [x] Meds as prescribed  [] Modalities as prescribed   [] Call Physician     Frequency/Duration:  Plan  Times per week: 2-3  Plan weeks: 6  Specific instructions for Next Treatment: reinitiate POC   Current Treatment Recommendations: Strengthening, ROM, Manual Therapy - Joint Manipulation, Manual Therapy - Soft Tissue Mobilization, Home Exercise Program, Equipment Evaluation, Education, & procurement, Patient/Caregiver Education & Training, Modalities, Safety Education & Training  Plan Comment: pt may only be able to come 1x per week d/t high copay      Pt to continue current HEP. See objective section for any therapeutic exercise changes, additions or modifications this date.     PT Individual Minutes  Time In: 4177  Time Out: 1050  Minutes: 53  Timed Code Treatment Minutes: 53 Minutes  Procedure Minutes: n/a     Signature:  Electronically signed by Radha Galdamez PT on 6/25/19 at 10:51 AM

## 2019-06-27 ENCOUNTER — HOSPITAL ENCOUNTER (OUTPATIENT)
Dept: PHYSICAL THERAPY | Age: 69
Setting detail: THERAPIES SERIES
Discharge: HOME | End: 2019-06-27
Payer: MEDICARE

## 2019-06-27 NOTE — PROGRESS NOTES
Therapy                            Cancellation/No-show Note      Date:  2019  Patient Name:  Ashleigh Wick  :  1950   MRN:  [id number]  Referring Practitioner: Char Dwyer  Diagnosis: B UE weakness, paresthesias, atrophy s/p anterior foraminectomy and SC decompression 3/5/19; cervical decompression s/p anterior foraminotomy     Visit Information:  PT Visit Information  PT Insurance Information: BCBS Medicare   Total # of Visits Approved: 12  Total # of Visits to Date: 4  Plan of Care/Certification Expiration Date: 19  No Show: 0  Canceled Appointment: 4  Progress Note Counter:  (19-19) -pt cx     For today's appointment patient:  [x]  Cancelled  []  Rescheduled appointment  []  No-show   []  Called pt to remind of next appointment     Reason given by patient:  []  Patient ill  []  Conflicting appointment  []  No transportation    []  Conflict with work  []  No reason given  [x]  Other:  Brother in hospital     [] Pt has future appointments scheduled, no follow up needed  [] Pt requests to be on hold.     Reason:   If > 2 weeks please discuss with therapist.  [] Therapist to call pt for follow up     Comments:       Signature: Electronically signed by Jessy Barron PT on 19 at 3:17 PM

## 2019-06-28 ENCOUNTER — HOSPITAL ENCOUNTER (OUTPATIENT)
Dept: PHYSICAL THERAPY | Age: 69
Setting detail: THERAPIES SERIES
Discharge: HOME | End: 2019-06-28
Payer: MEDICARE

## 2019-06-28 PROCEDURE — 97110 THERAPEUTIC EXERCISES: CPT

## 2019-06-28 PROCEDURE — 97140 MANUAL THERAPY 1/> REGIONS: CPT

## 2019-06-28 ASSESSMENT — PAIN DESCRIPTION - DESCRIPTORS: DESCRIPTORS: ACHING;SHARP

## 2019-06-28 ASSESSMENT — PAIN DESCRIPTION - ORIENTATION: ORIENTATION: RIGHT

## 2019-06-28 ASSESSMENT — PAIN DESCRIPTION - LOCATION: LOCATION: ARM

## 2019-06-28 ASSESSMENT — PAIN SCALES - GENERAL: PAINLEVEL_OUTOF10: 10

## 2019-06-28 NOTE — PROGRESS NOTES
[address] [address]  Outpatient Physical Therapy    Treatment Note        Date: 2019  Patient: Navjot Ramirez  : 1950  ACCT #: [de-identified]  Referring Practitioner: Lilly Dwyer  Diagnosis: B UE weakness, paresthesias, atrophy s/p anterior foraminectomy and SC decompression 3/5/19; cervical decompression s/p anterior foraminotomy     Visit Information:  PT Visit Information  PT Insurance Information: BCBS Medicare   Total # of Visits Approved: 12  Total # of Visits to Date: 5  Plan of Care/Certification Expiration Date: 19  No Show: 0  Canceled Appointment: 4  Progress Note Counter:  (19-19)     Subjective: Pt reports 10/10 \"I gotta do what I gotta do\"     HEP Compliance:  [x] Good [] Fair [] Poor [] Reports not doing due to:    Vital Signs  Patient Currently in Pain: Yes   Pain Screening  Patient Currently in Pain: Yes  Pain Assessment  Pain Level: 10  Pain Location: Arm  Pain Orientation: Right  Pain Descriptors: Aching; Sharp    OBJECTIVE:   Exercises  Exercise 1: seated cervical retractions 5\"x12  Exercise 2: pulleys 4 minutes  Exercise 3: scap retract 5\"x12  Exercise 5: bicep curls 3 ways x 10 B  Exercise 7: wall slides Flex abd 5 sec x 10 L x 5 R  Exercise 9: posture ex- shrugs x 15, rolls x15        Manual:   Manual therapy  Soft Tissue Mobalization: B UT, cervical parapsinals, LS, suboccipitals x 10 minutes    Modalities:  Modalities  Other: Declined  CP states will do at home. *Indicates exercise, modality, or manual techniques to be initiated when appropriate    Assessment: Body structures, Functions, Activity limitations: Decreased functional mobility , Decreased ADL status, Decreased ROM, Decreased strength, Increased Pain  Assessment: Pt struggles with Wall slides and Bicep curls palm up left. Moderate tightness Bilateral cervical and scap musculature, decreased with manual techniques. Reports not sleeping well.   Treatment Diagnosis: decreased

## 2019-07-02 ENCOUNTER — HOSPITAL ENCOUNTER (OUTPATIENT)
Dept: PHYSICAL THERAPY | Age: 69
Setting detail: THERAPIES SERIES
Discharge: HOME | End: 2019-07-02
Payer: MEDICARE

## 2019-07-02 PROCEDURE — 97140 MANUAL THERAPY 1/> REGIONS: CPT

## 2019-07-02 PROCEDURE — 97110 THERAPEUTIC EXERCISES: CPT

## 2019-07-02 ASSESSMENT — PAIN DESCRIPTION - LOCATION: LOCATION: ARM

## 2019-07-02 ASSESSMENT — PAIN DESCRIPTION - ORIENTATION: ORIENTATION: RIGHT

## 2019-07-02 ASSESSMENT — PAIN DESCRIPTION - PAIN TYPE: TYPE: CHRONIC PAIN

## 2019-07-02 ASSESSMENT — PAIN DESCRIPTION - FREQUENCY: FREQUENCY: CONTINUOUS

## 2019-07-02 ASSESSMENT — PAIN DESCRIPTION - DESCRIPTORS: DESCRIPTORS: ACHING;CONSTANT

## 2019-07-02 ASSESSMENT — PAIN SCALES - GENERAL: PAINLEVEL_OUTOF10: 8

## 2019-07-02 NOTE — PROGRESS NOTES
[address] [address]  Outpatient Physical Therapy    Treatment Note        Date: 2019  Patient: Brooke Naidu  : 1950  ACCT #: [de-identified]  Referring Practitioner: Sarah Dwyer  Diagnosis: B UE weakness, paresthesias, atrophy s/p anterior foraminectomy and SC decompression 3/5/19; cervical decompression s/p anterior foraminotomy     Visit Information:  PT Visit Information  PT Insurance Information: BCBS Medicare   Total # of Visits Approved: 12  Total # of Visits to Date: 6  Plan of Care/Certification Expiration Date: 19  No Show: 0  Canceled Appointment: 4  Progress Note Counter:  (19-19)     Subjective: Pt reports experimented with different pillow while sleeping last night, felt good at first however woke up in increased pain in R UE      HEP Compliance:  [x] Good [] Fair [] Poor [] Reports not doing due to:    Vital Signs  Patient Currently in Pain: Yes   Pain Screening  Patient Currently in Pain: Yes  Pain Assessment  Pain Assessment: 0-10  Pain Level: 8  Pain Type: Chronic pain  Pain Location: Arm  Pain Orientation: Right  Pain Descriptors: Aching;Constant  Pain Frequency: Continuous    OBJECTIVE:   Exercises  Exercise 1: seated cervical retractions 5\"x15  Exercise 2: pulleys 4 minutes  Exercise 3: scap retract 5\"x15  Exercise 4: gentle cervical arom x10   Exercise 5: bicep curls 3 ways x 10 B  Exercise 7: wall slides Flex abd 5 sec x 10 B   Exercise 9: posture ex- shrugs x 15, rolls x15    Strength: [x] NT  [] MMT completed:    ROM: [x] NT  [] ROM measurements:     Manual:   Manual therapy  Soft Tissue Mobalization: B UT, cervical parapsinals, LS, suboccipitals, SCM's x 8 minutes  *Indicates exercise, modality, or manual techniques to be initiated when appropriate    Assessment:    Body structures, Functions, Activity limitations: Decreased functional mobility , Decreased ADL status, Decreased ROM, Decreased strength, Increased Pain  Assessment: Progressed

## 2019-07-03 ENCOUNTER — HOSPITAL ENCOUNTER (OUTPATIENT)
Dept: PHYSICAL THERAPY | Age: 69
Setting detail: THERAPIES SERIES
Discharge: HOME | End: 2019-07-03
Payer: MEDICARE

## 2019-07-03 PROCEDURE — 97110 THERAPEUTIC EXERCISES: CPT

## 2019-07-03 PROCEDURE — 97140 MANUAL THERAPY 1/> REGIONS: CPT

## 2019-07-03 ASSESSMENT — PAIN SCALES - GENERAL: PAINLEVEL_OUTOF10: 7

## 2019-07-03 ASSESSMENT — PAIN DESCRIPTION - FREQUENCY: FREQUENCY: CONTINUOUS

## 2019-07-03 ASSESSMENT — PAIN DESCRIPTION - ORIENTATION: ORIENTATION: RIGHT

## 2019-07-03 ASSESSMENT — PAIN DESCRIPTION - PAIN TYPE: TYPE: CHRONIC PAIN

## 2019-07-03 ASSESSMENT — PAIN DESCRIPTION - DESCRIPTORS: DESCRIPTORS: ACHING

## 2019-07-03 ASSESSMENT — PAIN DESCRIPTION - LOCATION: LOCATION: ARM

## 2019-07-05 ENCOUNTER — APPOINTMENT (OUTPATIENT)
Dept: PHYSICAL THERAPY | Age: 69
End: 2019-07-05
Payer: MEDICARE

## 2019-07-08 ENCOUNTER — HOSPITAL ENCOUNTER (OUTPATIENT)
Dept: PHYSICAL THERAPY | Age: 69
Setting detail: THERAPIES SERIES
Discharge: HOME | End: 2019-07-08
Payer: MEDICARE

## 2019-07-08 PROCEDURE — 97110 THERAPEUTIC EXERCISES: CPT

## 2019-07-08 PROCEDURE — 97140 MANUAL THERAPY 1/> REGIONS: CPT

## 2019-07-08 ASSESSMENT — PAIN DESCRIPTION - PAIN TYPE: TYPE: CHRONIC PAIN

## 2019-07-08 ASSESSMENT — PAIN DESCRIPTION - DESCRIPTORS: DESCRIPTORS: SHARP

## 2019-07-08 ASSESSMENT — PAIN DESCRIPTION - ORIENTATION: ORIENTATION: RIGHT;LEFT

## 2019-07-08 ASSESSMENT — PAIN DESCRIPTION - LOCATION: LOCATION: NECK;ARM

## 2019-07-08 ASSESSMENT — PAIN SCALES - GENERAL: PAINLEVEL_OUTOF10: 8

## 2019-07-08 NOTE — PROGRESS NOTES
[address] [address]  Outpatient Physical Therapy    Treatment Note        Date: 2019  Patient: Laura Arnett  : 1950  ACCT #: [de-identified]  Referring Practitioner: David Dwyer  Diagnosis: B UE weakness, paresthesias, atrophy s/p anterior foraminectomy and SC decompression 3/5/19; cervical decompression s/p anterior foraminotomy     Visit Information:  PT Visit Information  PT Insurance Information: BCBS Medicare   Total # of Visits Approved: 12  Total # of Visits to Date: 8  Plan of Care/Certification Expiration Date: 19  No Show: 0  Canceled Appointment: 4  Progress Note Counter:  (19-19)     Subjective: pain today is 20, in my neck and both hands, sleep is still not good with increased pain upon waking up this am  Comments: MRI to be scheduled   HEP Compliance:  [x] Good [] Fair [] Poor [] Reports not doing due to:    Vital Signs  Patient Currently in Pain: Yes   Pain Screening  Patient Currently in Pain: Yes  Pain Assessment  Pain Level: 8  Pain Type: Chronic pain  Pain Location: Neck;Arm  Pain Orientation: Right;Left  Pain Descriptors: Sharp    OBJECTIVE:   Exercises  Exercise 1: seated cervical retractions 5\"x15  Exercise 2: pulleys 4 minutes  Exercise 3: scap retract 5\"x15  Exercise 4: gentle cervical arom x10  Exercise 5: bicep curls 3 ways x 10 B  Exercise 6: tricep extension YTB x10   Exercise 7: wall slides Flex abd 5 sec x 10 B   Exercise 8: shoulder flex 90/90 x10 abd x8  Exercise 9: tband rows/lats YTB x10  Exercise 20: HEP: cont current     Strength: [x] NT  [] MMT completed:    ROM: [x] NT  [] ROM measurements:     Manual:   Manual therapy  Soft Tissue Mobalization: B UT, cervical parapsinals, LS, suboccipitals, scalenes, SCM's x 8 minutes    *Indicates exercise, modality, or manual techniques to be initiated when appropriate    Assessment:    Body structures, Functions, Activity limitations: Decreased functional mobility , Decreased ADL status, Decreased ROM, Decreased strength, Increased Pain  Assessment: Performed most ex in wbing/standing this date requiring frequent VC's for posture and neck position with fair carryover, decreased with increased fatigue. Progressed multiple strengthening ex with good jocelyn. Treatment Diagnosis: decreased B UE strength, decreased cervical and R UE arom, decreased postural awareness, decreased fucntional activity tolernace, and increased pain  Prognosis: Good, Fair     Goals:  Short term goals  Time Frame for Short term goals: 3 weeks   Short term goal 1: The pt will demonstrate improved postural awareness requiring <25% VC's with exercises    Long term goals  Time Frame for Long term goals : 6 weeks   Long term goal 1: The pt will have a increase in UEFI score >/=10 points in order to increase functional activity tolerance  Long term goal 2: The pt will demonstrate improved B UE strength >/=4+/5 in order to lift/carry with decreased pain  Long term goal 3: The pt will demo improved  strength >/=15# in order to  objects   Long term goal 4: The pt will demo improved cervical AROM >/=10* in order to increase ease with ADL's  Long term goal 5: The pt will demo improved R shoulder AROM flex/abd>/=110, ER to base of occiput, IR to L4 in order to increase ease with reaching activity   Progress toward goals: good     POST-PAIN       Pain Rating (0-10 pain scale):  4 /10   Location and pain description same as pre-treatment unless indicated.    Action: [] NA   [x] Perform HEP  [] Meds as prescribed  [] Modalities as prescribed   [] Call Physician     Frequency/Duration:  Plan  Times per week: 2-3  Plan weeks: 6  Current Treatment Recommendations: Strengthening, ROM, Manual Therapy - Joint Manipulation, Manual Therapy - Soft Tissue Mobilization, Home Exercise Program, Equipment Evaluation, Education, & procurement, Patient/Caregiver Education & Training, Modalities, Safety Education & Training     Pt to continue current

## 2019-07-09 ENCOUNTER — OFFICE VISIT (OUTPATIENT)
Dept: INTERNAL MEDICINE CLINIC | Age: 69
End: 2019-07-09
Payer: MEDICARE

## 2019-07-09 VITALS
HEIGHT: 67 IN | WEIGHT: 142 LBS | BODY MASS INDEX: 22.29 KG/M2 | TEMPERATURE: 97.6 F | HEART RATE: 60 BPM | DIASTOLIC BLOOD PRESSURE: 74 MMHG | SYSTOLIC BLOOD PRESSURE: 112 MMHG

## 2019-07-09 DIAGNOSIS — M72.0 DUPUYTREN CONTRACTURE: ICD-10-CM

## 2019-07-09 DIAGNOSIS — M79.642 HAND PAIN, LEFT: Primary | ICD-10-CM

## 2019-07-09 PROCEDURE — 99213 OFFICE O/P EST LOW 20 MIN: CPT | Performed by: INTERNAL MEDICINE

## 2019-07-09 RX ORDER — AMITRIPTYLINE HYDROCHLORIDE 10 MG/1
TABLET, FILM COATED ORAL
Qty: 90 TABLET | Refills: 1
Start: 2019-07-09 | End: 2019-11-19

## 2019-07-09 ASSESSMENT — ENCOUNTER SYMPTOMS
SORE THROAT: 0
SHORTNESS OF BREATH: 0
ABDOMINAL PAIN: 0
TROUBLE SWALLOWING: 0
VOICE CHANGE: 0

## 2019-07-09 NOTE — PATIENT INSTRUCTIONS
\"Dupuytren's Contracture: Care Instructions. \"     If you do not have an account, please click on the \"Sign Up Now\" link. Current as of: September 20, 2018  Content Version: 12.0  © 3708-9942 Healthwise, Incorporated. Care instructions adapted under license by [facility] ([facility] [place]). If you have questions about a medical condition or this instruction, always ask your healthcare professional. [address] any warranty or liability for your use of this information.

## 2019-07-09 NOTE — PROGRESS NOTES
[facility])       Social History     Socioeconomic History    Marital status:      Spouse name: Not on file    Number of children: 1    Years of education: Not on file    Highest education level: Not on file   Occupational History    Occupation: retired [employer]   Social Needs    Financial resource strain: Not on file    Food insecurity:     Worry: Not on file     Inability: Not on file   [employer] needs:     Medical: Not on file     Non-medical: Not on file   Tobacco Use    Smoking status: Current Some Day Smoker     Packs/day: 0.15     Years: 40.00     Pack years: 6.00     Types: Cigarettes     Last attempt to quit: 3/1/2019     Years since quittin.3    Smokeless tobacco: Never Used    Tobacco comment: working on cutting back, will try the electronic cigarette   Substance and Sexual Activity    Alcohol use: No    Drug use: No    Sexual activity: Not on file   Lifestyle    Physical activity:     Days per week: Not on file     Minutes per session: Not on file    Stress: Not on file   Relationships    Social connections:     Talks on phone: Not on file     Gets together: Not on file     Attends Jainism service: Not on file     Active member of club or organization: Not on file     Attends meetings of clubs or organizations: Not on file     Relationship status: Not on file    Intimate partner violence:     Fear of current or ex partner: Not on file     Emotionally abused: Not on file     Physically abused: Not on file     Forced sexual activity: Not on file   Other Topics Concern    Not on file   Social History Narrative    Born in [place], 1 brother and 3 sisters, keeps in touch with brother     Arvind Degree came from [place]     , worked at [employer] x 43 years, got injured in         Lives at home with spouse, one son, keeps in touch    (Was on [employer] comp till )    [address] housework, 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 ALLERGIES    Patient has no known allergies. Current Outpatient Medications on File Prior to Visit   Medication Sig Dispense Refill    Iodoquinol-Hydrocortisone-Aloe 1-1.9 % CREA Apply to the affected areas 1-2 times daily as needed.  aspirin 81 MG EC tablet Take 81 mg by mouth daily. No current facility-administered medications on file prior to visit. Review of Systems   Constitutional: Negative for activity change, appetite change and unexpected weight change. HENT: Negative for sore throat, trouble swallowing and voice change. Respiratory: Negative for shortness of breath. Cardiovascular: Negative for chest pain and palpitations. Gastrointestinal: Negative for abdominal pain. Endocrine: Negative for cold intolerance and heat intolerance. Genitourinary: Negative for dysuria. Musculoskeletal: Positive for neck pain and neck stiffness. Negative for joint swelling. Skin: Negative for rash. Allergic/Immunologic: Negative for immunocompromised state. Neurological: Positive for tingling, weakness and numbness. Negative for dizziness, tremors, light-headedness and headaches. Psychiatric/Behavioral: Negative for decreased concentration, dysphoric mood and sleep disturbance. The patient is nervous/anxious. Vitals:    07/09/19 1211   BP: 112/74   Site: Right Upper Arm   Position: Sitting   Cuff Size: Medium Adult   Pulse: 60   Temp: 97.6 °F (36.4 °C)   TempSrc: Tympanic   Weight: 142 lb (64.4 kg)   Height: 5' 7\" (1.702 m)       Physical Exam   Constitutional: He appears well-nourished. No distress. HENT:   Head: Atraumatic. Eyes: Conjunctivae are normal.   Neck: Normal range of motion. No thyromegaly present.    Cardiovascular: Regular amitriptyline (ELAVIL) 10 MG tablet     Sig: Take 1 tab po daily at HS     Dispense:  90 tablet     Refill:  1       Further workup and plan will be determined based on clinical progression and follow up test/ treatment results. Close follow up needed to evaluate treatment results and for care coordination. Return in about 3 months (around 10/9/2019). I have reviewed the patient's medical and surgical, family and social history, health maintenance schedule, and updated the computerized patient record. Please note this report has been partially produced by using speech recognition hardware. It may contain errors related to the system, including grammar, punctuation and spelling as well as words and phrases that may seem inaccurate. For anyquestions or concerns, please feel free to contact me for clarification.         Electronically signed by Darryl Olivas MD

## 2019-07-10 ENCOUNTER — HOSPITAL ENCOUNTER (OUTPATIENT)
Dept: PHYSICAL THERAPY | Age: 69
Setting detail: THERAPIES SERIES
Discharge: HOME | End: 2019-07-10
Payer: MEDICARE

## 2019-07-10 PROCEDURE — 97140 MANUAL THERAPY 1/> REGIONS: CPT

## 2019-07-10 PROCEDURE — 97110 THERAPEUTIC EXERCISES: CPT

## 2019-07-10 ASSESSMENT — PAIN DESCRIPTION - LOCATION: LOCATION: NECK

## 2019-07-10 ASSESSMENT — PAIN SCALES - GENERAL: PAINLEVEL_OUTOF10: 4

## 2019-07-10 ASSESSMENT — PAIN DESCRIPTION - PAIN TYPE: TYPE: CHRONIC PAIN

## 2019-07-10 ASSESSMENT — PAIN DESCRIPTION - DESCRIPTORS: DESCRIPTORS: SHARP

## 2019-07-10 ASSESSMENT — PAIN DESCRIPTION - ORIENTATION: ORIENTATION: RIGHT;LEFT

## 2019-07-12 ENCOUNTER — HOSPITAL ENCOUNTER (OUTPATIENT)
Dept: PHYSICAL THERAPY | Age: 69
Setting detail: THERAPIES SERIES
Discharge: HOME | End: 2019-07-12
Payer: MEDICARE

## 2019-07-12 PROCEDURE — 97110 THERAPEUTIC EXERCISES: CPT

## 2019-07-12 PROCEDURE — 97140 MANUAL THERAPY 1/> REGIONS: CPT

## 2019-07-12 ASSESSMENT — PAIN SCALES - GENERAL: PAINLEVEL_OUTOF10: 8

## 2019-07-12 ASSESSMENT — PAIN DESCRIPTION - DESCRIPTORS: DESCRIPTORS: ACHING;SHARP

## 2019-07-12 ASSESSMENT — PAIN DESCRIPTION - ORIENTATION: ORIENTATION: RIGHT;LEFT

## 2019-07-12 ASSESSMENT — PAIN DESCRIPTION - PAIN TYPE: TYPE: CHRONIC PAIN

## 2019-07-12 ASSESSMENT — PAIN DESCRIPTION - LOCATION: LOCATION: HAND;NECK

## 2019-07-12 NOTE — PROGRESS NOTES
NA   [] Perform HEP  [] Meds as prescribed  [] Modalities as prescribed   [] Call Physician     Frequency/Duration:  Plan  Times per week: 1-2  Plan weeks: 6 (pending ins auth)  Current Treatment Recommendations: Strengthening, ROM, Manual Therapy - Joint Manipulation, Manual Therapy - Soft Tissue Mobilization, Home Exercise Program, Equipment Evaluation, Education, & procurement, Patient/Caregiver Education & Training, Modalities, Safety Education & Training  Plan Comment: PN completed this date to request additional visits      Pt to continue current HEP. See objective section for any therapeutic exercise changes, additions or modifications this date.     PT Individual Minutes  Time In: 1120  Time Out: 1787  Minutes: 38  Timed Code Treatment Minutes: 38 Minutes  Procedure Minutes: 0    Signature:  Electronically signed by Rogelio Salcedo PT on 7/12/19 at 11:59 AM

## 2019-07-23 ENCOUNTER — HOSPITAL ENCOUNTER (OUTPATIENT)
Dept: PHYSICAL THERAPY | Age: 69
Setting detail: THERAPIES SERIES
Discharge: HOME | End: 2019-07-23
Payer: MEDICARE

## 2019-07-23 PROCEDURE — 97140 MANUAL THERAPY 1/> REGIONS: CPT

## 2019-07-23 PROCEDURE — 97110 THERAPEUTIC EXERCISES: CPT

## 2019-07-23 ASSESSMENT — PAIN DESCRIPTION - DESCRIPTORS: DESCRIPTORS: ACHING;SHARP

## 2019-07-23 ASSESSMENT — PAIN DESCRIPTION - PAIN TYPE: TYPE: CHRONIC PAIN

## 2019-07-23 ASSESSMENT — PAIN DESCRIPTION - LOCATION: LOCATION: HAND

## 2019-07-23 ASSESSMENT — PAIN DESCRIPTION - ORIENTATION: ORIENTATION: LEFT;RIGHT

## 2019-07-30 ENCOUNTER — HOSPITAL ENCOUNTER (OUTPATIENT)
Dept: MRI IMAGING | Age: 69
Discharge: HOME | End: 2019-08-01
Payer: MEDICARE

## 2019-07-30 ENCOUNTER — HOSPITAL ENCOUNTER (OUTPATIENT)
Dept: PHYSICAL THERAPY | Age: 69
Setting detail: THERAPIES SERIES
Discharge: HOME | End: 2019-07-30
Payer: MEDICARE

## 2019-07-30 DIAGNOSIS — M48.02 CERVICAL SPINAL STENOSIS: ICD-10-CM

## 2019-07-30 PROCEDURE — A9579 GAD-BASE MR CONTRAST NOS,1ML: HCPCS | Performed by: PHYSICIAN ASSISTANT

## 2019-07-30 PROCEDURE — 72156 MRI NECK SPINE W/O & W/DYE: CPT

## 2019-07-30 PROCEDURE — 6360000004 HC RX CONTRAST MEDICATION: Performed by: PHYSICIAN ASSISTANT

## 2019-07-30 PROCEDURE — 97110 THERAPEUTIC EXERCISES: CPT

## 2019-07-30 PROCEDURE — 97140 MANUAL THERAPY 1/> REGIONS: CPT

## 2019-07-30 RX ORDER — SODIUM CHLORIDE 0.9 % (FLUSH) 0.9 %
10 SYRINGE (ML) INJECTION 2 TIMES DAILY
Status: DISCONTINUED | OUTPATIENT
Start: 2019-07-30 | End: 2019-08-02 | Stop reason: HOSPADM

## 2019-07-30 RX ADMIN — GADOTERIDOL 15 ML: 279.3 INJECTION, SOLUTION INTRAVENOUS at 18:45

## 2019-07-30 ASSESSMENT — PAIN DESCRIPTION - LOCATION: LOCATION: HAND

## 2019-07-30 ASSESSMENT — PAIN DESCRIPTION - ORIENTATION: ORIENTATION: LEFT

## 2019-07-30 ASSESSMENT — PAIN DESCRIPTION - PAIN TYPE: TYPE: CHRONIC PAIN

## 2019-07-30 ASSESSMENT — PAIN SCALES - GENERAL: PAINLEVEL_OUTOF10: 4

## 2019-07-30 ASSESSMENT — PAIN DESCRIPTION - DESCRIPTORS: DESCRIPTORS: ACHING

## 2019-07-30 NOTE — PROGRESS NOTES
Home Exercise Program, Equipment Evaluation, Education, & procurement, Patient/Caregiver Education & Training, Modalities, Safety Education & Training  Plan Comment: Pt would like to come 1x per week d/t copay      Pt to continue current HEP. See objective section for any therapeutic exercise changes, additions or modifications this date.     PT Individual Minutes  Time In: 2328  Time Out: 4673  Minutes: 38  Timed Code Treatment Minutes: 38 Minutes  Procedure Minutes: 0    Signature:  Electronically signed by Adonay Birmingham PT on 7/30/19 at 2:20 PM

## 2019-08-06 ENCOUNTER — HOSPITAL ENCOUNTER (OUTPATIENT)
Dept: PHYSICAL THERAPY | Age: 69
Setting detail: THERAPIES SERIES
Discharge: HOME | End: 2019-08-06
Payer: MEDICARE

## 2019-08-06 PROCEDURE — 97140 MANUAL THERAPY 1/> REGIONS: CPT

## 2019-08-06 PROCEDURE — 97110 THERAPEUTIC EXERCISES: CPT

## 2019-08-06 ASSESSMENT — PAIN DESCRIPTION - LOCATION: LOCATION: HAND;NECK

## 2019-08-06 ASSESSMENT — PAIN DESCRIPTION - DESCRIPTORS: DESCRIPTORS: ACHING;TIRING

## 2019-08-06 ASSESSMENT — PAIN DESCRIPTION - ORIENTATION: ORIENTATION: LEFT

## 2019-08-06 ASSESSMENT — PAIN DESCRIPTION - PAIN TYPE: TYPE: CHRONIC PAIN

## 2019-08-06 ASSESSMENT — PAIN SCALES - GENERAL: PAINLEVEL_OUTOF10: 8

## 2019-08-13 ENCOUNTER — HOSPITAL ENCOUNTER (OUTPATIENT)
Dept: PHYSICAL THERAPY | Age: 69
Setting detail: THERAPIES SERIES
Discharge: HOME | End: 2019-08-13
Payer: MEDICARE

## 2019-08-14 ENCOUNTER — HOSPITAL ENCOUNTER (OUTPATIENT)
Dept: PHYSICAL THERAPY | Age: 69
Setting detail: THERAPIES SERIES
Discharge: HOME | End: 2019-08-14
Payer: MEDICARE

## 2019-08-14 PROCEDURE — 97140 MANUAL THERAPY 1/> REGIONS: CPT

## 2019-08-14 PROCEDURE — 97110 THERAPEUTIC EXERCISES: CPT

## 2019-08-14 ASSESSMENT — PAIN DESCRIPTION - LOCATION: LOCATION: HAND

## 2019-08-14 ASSESSMENT — PAIN DESCRIPTION - ORIENTATION: ORIENTATION: RIGHT;LEFT

## 2019-08-14 ASSESSMENT — PAIN DESCRIPTION - DESCRIPTORS: DESCRIPTORS: ACHING;TIGHTNESS

## 2019-08-14 ASSESSMENT — PAIN DESCRIPTION - PAIN TYPE: TYPE: CHRONIC PAIN

## 2019-08-14 ASSESSMENT — PAIN DESCRIPTION - FREQUENCY: FREQUENCY: CONTINUOUS

## 2019-08-14 ASSESSMENT — PAIN SCALES - GENERAL: PAINLEVEL_OUTOF10: 10

## 2019-08-20 ENCOUNTER — HOSPITAL ENCOUNTER (OUTPATIENT)
Dept: PHYSICAL THERAPY | Age: 69
Setting detail: THERAPIES SERIES
Discharge: HOME | End: 2019-08-20
Payer: MEDICARE

## 2019-08-20 PROCEDURE — 97110 THERAPEUTIC EXERCISES: CPT

## 2019-08-20 ASSESSMENT — PAIN DESCRIPTION - ORIENTATION: ORIENTATION: RIGHT;LEFT

## 2019-08-20 ASSESSMENT — PAIN DESCRIPTION - DESCRIPTORS: DESCRIPTORS: ACHING;CONSTANT;TIGHTNESS

## 2019-08-20 ASSESSMENT — PAIN DESCRIPTION - LOCATION: LOCATION: HAND;NECK

## 2019-08-20 ASSESSMENT — PAIN SCALES - GENERAL: PAINLEVEL_OUTOF10: 6

## 2019-08-20 ASSESSMENT — PAIN DESCRIPTION - PAIN TYPE: TYPE: CHRONIC PAIN

## 2019-08-20 NOTE — PROGRESS NOTES
exercise, modality, or manual techniques to be initiated when appropriate    Assessment: Body structures, Functions, Activity limitations: Decreased functional mobility , Decreased ADL status, Decreased ROM, Decreased strength, Increased Pain  Assessment: Progressed ther ex to further increase functional UE/postural strength with fair tolerance. Increased fatigue noted requiring intermittent RB's. No signifciant c/o increased pain throughout. Conts to require VC's for posture and head position ~25% of time. Treatment Diagnosis: decreased B UE strength, decreased cervical and R UE arom, decreased postural awareness, decreased fucntional activity tolernace, and increased pain  Prognosis: Good, Fair  Patient Education: provided written updated for hep     Goals:  Short term goals  Time Frame for Short term goals: 3 weeks   Short term goal 1: The pt will demonstrate improved postural awareness requiring <25% VC's with exercises    Long term goals  Time Frame for Long term goals : 6 weeks   Long term goal 1: Pt will demo improved UEFI >/=40/80 to increase functional activity tolerance (updated 7/12/19)  Long term goal 2: The pt will demonstrate improved B UE strength >/=4+/5 in order to lift/carry with decreased pain  Long term goal 3: The pt will demo improved  strength >/=15# in order to  objects   Long term goal 4: The pt will demo improved cervical AROM >/=10* in order to increase ease with ADL's  Long term goal 5: The pt will demo improved R shoulder AROM flex/abd>/=125, ER to C5, IR to L4 in order to increase ease with reaching activity (updated 7/12/19)  Progress toward goals: good     POST-PAIN       Pain Rating (0-10 pain scale):   6/10   Location and pain description same as pre-treatment unless indicated.    Action: [] NA   [x] Perform HEP  [] Meds as prescribed  [] Modalities as prescribed   [] Call Physician     Frequency/Duration:  Plan  Times per week: 1-2  Plan weeks: 12 visits through

## 2019-08-27 ENCOUNTER — HOSPITAL ENCOUNTER (OUTPATIENT)
Dept: PHYSICAL THERAPY | Age: 69
Setting detail: THERAPIES SERIES
Discharge: HOME | End: 2019-08-27
Payer: MEDICARE

## 2019-08-27 PROCEDURE — 97110 THERAPEUTIC EXERCISES: CPT

## 2019-08-27 ASSESSMENT — PAIN SCALES - GENERAL: PAINLEVEL_OUTOF10: 5

## 2019-08-27 ASSESSMENT — PAIN DESCRIPTION - ORIENTATION: ORIENTATION: RIGHT;LEFT

## 2019-08-27 ASSESSMENT — PAIN DESCRIPTION - PAIN TYPE: TYPE: CHRONIC PAIN

## 2019-08-27 ASSESSMENT — PAIN DESCRIPTION - LOCATION: LOCATION: HAND

## 2019-08-27 NOTE — PROGRESS NOTES
[address] [address]  Outpatient Physical Therapy    Treatment Note        Date: 2019  Patient: Karen Neri. : 1950  ACCT #: [de-identified]  Referring Practitioner: Mehnaz Dwyer  Diagnosis: B UE weakness, paresthesias, atrophy s/p anterior foraminectomy and SC decompression 3/5/19; cervical decompression s/p anterior foraminotomy     Visit Information:  PT Visit Information  PT Insurance Information: BCBS Medicare   Total # of Visits Approved: 12(19 thru 19)  Total # of Visits to Date: 12  Plan of Care/Certification Expiration Date: 19  No Show: 0  Canceled Appointment: 5  Progress Note Counter:     Subjective: Pt reports pain has \"calmed down\" and has remained around a constant 5/10. Compliant with HEP every other day. Pt also reports performing more activites around the house. Discussed upcoming D/C in 2 visits with pt in agreement.       HEP Compliance:  [x] Good [] Fair [] Poor [] Reports not doing due to:    Vital Signs  Patient Currently in Pain: Yes   Pain Screening  Patient Currently in Pain: Yes  Pain Assessment  Pain Assessment: 0-10  Pain Level: 5  Pain Type: Chronic pain  Pain Location: Hand  Pain Orientation: Right;Left    OBJECTIVE:   Exercises  Exercise 1: SA punches YTB x15   Exercise 2: UBE L2.0 2'F/2'R for increased functional UE strength/endurance; increased fatigue requiring RB x5  Exercise 3: B ER with YTB x15 with back against wall   Exercise 4: Shoulder IR x20/ER x20 with RTB   Exercise 5: wall wash 4 way x10 B   Exercise 6: cervical retraction with back against wall 5\"x10   Exercise 8: shoulder flex/abd x15 with back against wall to decrease trunk compensations and increase posture  Exercise 9: tband rows/lats RTB x20  Exercise 10: SA wall slide YTB x7   Exercise 11: prone over pball horiz abd IR/ER x8   Exercise 20: HEP: corner stretch, cervical retraction against wall      Strength: [] NT  [x] MMT completed:  Strength Other  Other: HEP  [x] Meds as prescribed  [] Modalities as prescribed   [] Call Physician     Frequency/Duration:  Plan  Times per week: 1-2  Plan weeks: 12 visits through 9/13/19  Current Treatment Recommendations: Strengthening, ROM, Manual Therapy - Joint Manipulation, Manual Therapy - Soft Tissue Mobilization, Home Exercise Program, Equipment Evaluation, Education, & procurement, Patient/Caregiver Education & Training, Modalities, Safety Education & Training  Plan Comment: Pt would like to come 1x per week d/t copay; D/C at end of POC     Pt to continue current HEP. See objective section for any therapeutic exercise changes, additions or modifications this date.     PT Individual Minutes  Time In: 7921  Time Out: 5350  Minutes: 38  Timed Code Treatment Minutes: 38 Minutes  Procedure Minutes: 0     Timed Activity Minutes Units   Ther Ex 38 3     Signature:  Electronically signed by Rogelio Salcedo PT on 8/27/19 at 1:45 PM

## 2019-09-03 ENCOUNTER — HOSPITAL ENCOUNTER (OUTPATIENT)
Dept: PHYSICAL THERAPY | Age: 69
Setting detail: THERAPIES SERIES
Discharge: HOME | End: 2019-09-03
Payer: MEDICARE

## 2019-09-03 PROCEDURE — 97110 THERAPEUTIC EXERCISES: CPT

## 2019-09-10 ENCOUNTER — OFFICE VISIT (OUTPATIENT)
Dept: INTERNAL MEDICINE CLINIC | Age: 69
End: 2019-09-10
Payer: MEDICARE

## 2019-09-10 ENCOUNTER — HOSPITAL ENCOUNTER (OUTPATIENT)
Dept: PHYSICAL THERAPY | Age: 69
Setting detail: THERAPIES SERIES
Discharge: HOME | End: 2019-09-10
Payer: MEDICARE

## 2019-09-10 VITALS
TEMPERATURE: 97.6 F | BODY MASS INDEX: 21.77 KG/M2 | WEIGHT: 139 LBS | DIASTOLIC BLOOD PRESSURE: 60 MMHG | HEART RATE: 68 BPM | OXYGEN SATURATION: 96 % | SYSTOLIC BLOOD PRESSURE: 103 MMHG

## 2019-09-10 DIAGNOSIS — M19.041 PRIMARY OSTEOARTHRITIS OF BOTH HANDS: ICD-10-CM

## 2019-09-10 DIAGNOSIS — M79.641 BILATERAL HAND PAIN: ICD-10-CM

## 2019-09-10 DIAGNOSIS — M72.0 DUPUYTREN'S CONTRACTURE OF LEFT HAND: Primary | ICD-10-CM

## 2019-09-10 DIAGNOSIS — M19.042 PRIMARY OSTEOARTHRITIS OF BOTH HANDS: ICD-10-CM

## 2019-09-10 DIAGNOSIS — M79.642 BILATERAL HAND PAIN: ICD-10-CM

## 2019-09-10 PROCEDURE — 99213 OFFICE O/P EST LOW 20 MIN: CPT | Performed by: INTERNAL MEDICINE

## 2019-09-10 RX ORDER — UREA 40 %
CREAM (GRAM) TOPICAL
COMMUNITY
Start: 2017-12-11 | End: 2020-01-07 | Stop reason: CLARIF

## 2019-09-10 ASSESSMENT — ENCOUNTER SYMPTOMS
VOICE CHANGE: 0
SHORTNESS OF BREATH: 0
ABDOMINAL PAIN: 0
BACK PAIN: 0
TROUBLE SWALLOWING: 0

## 2019-09-10 NOTE — PATIENT INSTRUCTIONS
Patient Education        Dupuytren's Contracture: Care Instructions  Your Care Instructions    In Dupuytren's contracture, the fingers become stiff and curl toward the palm. It is caused by thick tissue that grows under the skin in the palm of the hand. Sometimes the condition affects the palm but not the fingers. If the tissue gets thicker and affects one or more fingers, it may limit movement of your fingers and hand. Sometimes the condition can occur in the soles of the feet. The cause of Dupuytren's disease is not known. Dupuytren's disease may get worse slowly. If you have mild Dupuytren's disease, you may be able to keep your fingers moving with regular stretching. Surgery usually helps in severe cases. However, Dupuytren's disease can come back. Follow-up care is a key part of your treatment and safety. Be sure to make and go to all appointments, and call your doctor if you are having problems. It's also a good idea to know your test results and keep a list of the medicines you take. How can you care for yourself at home? · Follow your doctor's advice for physical or occupational therapy and exercises to put your fingers and hand through a range of motion. · Two times a day, massage your hand and gently stretch the fingers back. This can get rid of tightness and help keep your fingers flexible. · Try to avoid curling your hand tightly. For example, use utensils and tools that have larger hand . When should you call for help? Call your doctor now or seek immediate medical care if:    · You have numbness in your fingers.     · You have a wound or sore on your finger or palm.     · Your hand or fingers get worse.    Watch closely for changes in your health, and be sure to contact your doctor if you have any problems. Where can you learn more? Go to [email or web address] and sign in to your [email or web address] account.  Enter T867 in the [employer] box to learn more about \"Dupuytren's Contracture: Care Instructions. \"     If you do not have an account, please click on the \"Sign Up Now\" link. Current as of: September 20, 2018  Content Version: 12.1  © 7181-6338 Healthwise, Incorporated. Care instructions adapted under license by [facility] [place] ([facility]). If you have questions about a medical condition or this instruction, always ask your healthcare professional. [address] any warranty or liability for your use of this information.

## 2019-09-12 ENCOUNTER — HOSPITAL ENCOUNTER (OUTPATIENT)
Dept: PHYSICAL THERAPY | Age: 69
Setting detail: THERAPIES SERIES
Discharge: HOME | End: 2019-09-12
Payer: MEDICARE

## 2019-09-12 PROCEDURE — 97110 THERAPEUTIC EXERCISES: CPT

## 2019-09-12 NOTE — PROGRESS NOTES
Functions, Activity limitations: Decreased functional mobility , Decreased ADL status, Decreased ROM, Decreased strength, Increased Pain  Assessment: Pt with slow progress towards goals since last assessment. Pt has met fucntional plateau with hand pain ranging from 5-10/10 over the last month. Pt with decreased  strength this date vs last measured possibly d/t increased pain. Pt is currently indep/compliant with current hep and it is further recommended that pt cont with HEP to maiintain current level of function. D/C skilled PT at this time. Treatment Diagnosis: decreased B UE strength, decreased cervical and R UE arom, decreased postural awareness, decreased fucntional activity tolernace, and increased pain  Prognosis: Good, Fair     Goals:  Short term goals  Time Frame for Short term goals: 3 weeks   Short term goal 1: The pt will demonstrate improved postural awareness requiring <25% VC's with exercises    Long term goals  Time Frame for Long term goals : 6 weeks   Long term goal 1: Pt will demo improved UEFI >/=40/80 to increase functional activity tolerance (updated 7/12/19)  Long term goal 2: The pt will demonstrate improved B UE strength >/=4+/5 in order to lift/carry with decreased pain  Long term goal 3: The pt will demo improved  strength >/=15# in order to  objects   Long term goal 4: The pt will demo improved cervical AROM >/=10* in order to increase ease with ADL's  Long term goal 5: The pt will demo improved R shoulder AROM flex/abd>/=125, ER to C5, IR to L4 in order to increase ease with reaching activity (updated 7/12/19)  Progress toward goals:see d/c      POST-PAIN       Pain Rating (0-10 pain scale):  same /10   Location and pain description same as pre-treatment unless indicated. Action: [] NA   [x] Perform HEP  [] Meds as prescribed  [] Modalities as prescribed   [] Call Physician     Frequency/Duration:  Plan  Plan Comment: D/C      Pt to continue current HEP.   See objective

## 2019-10-22 ENCOUNTER — HOSPITAL ENCOUNTER (OUTPATIENT)
Dept: GENERAL RADIOLOGY | Age: 69
Discharge: HOME | End: 2019-10-24
Payer: MEDICARE

## 2019-10-22 ENCOUNTER — OFFICE VISIT (OUTPATIENT)
Dept: INTERNAL MEDICINE CLINIC | Age: 69
End: 2019-10-22
Payer: MEDICARE

## 2019-10-22 VITALS
TEMPERATURE: 96.1 F | OXYGEN SATURATION: 98 % | BODY MASS INDEX: 22.1 KG/M2 | RESPIRATION RATE: 18 BRPM | SYSTOLIC BLOOD PRESSURE: 100 MMHG | HEIGHT: 67 IN | HEART RATE: 74 BPM | WEIGHT: 140.8 LBS | DIASTOLIC BLOOD PRESSURE: 60 MMHG

## 2019-10-22 DIAGNOSIS — M19.041 PRIMARY OSTEOARTHRITIS OF BOTH HANDS: Primary | ICD-10-CM

## 2019-10-22 DIAGNOSIS — M19.042 PRIMARY OSTEOARTHRITIS OF BOTH HANDS: Primary | ICD-10-CM

## 2019-10-22 DIAGNOSIS — G89.29 CHRONIC HAND PAIN, UNSPECIFIED LATERALITY: ICD-10-CM

## 2019-10-22 DIAGNOSIS — M19.041 PRIMARY OSTEOARTHRITIS OF BOTH HANDS: ICD-10-CM

## 2019-10-22 DIAGNOSIS — M19.042 PRIMARY OSTEOARTHRITIS OF BOTH HANDS: ICD-10-CM

## 2019-10-22 DIAGNOSIS — M79.643 CHRONIC HAND PAIN, UNSPECIFIED LATERALITY: ICD-10-CM

## 2019-10-22 PROBLEM — G95.20 CERVICAL SPINAL CORD COMPRESSION (HCC): Status: RESOLVED | Noted: 2019-01-23 | Resolved: 2019-10-22

## 2019-10-22 PROCEDURE — 99213 OFFICE O/P EST LOW 20 MIN: CPT | Performed by: INTERNAL MEDICINE

## 2019-10-22 PROCEDURE — 73130 X-RAY EXAM OF HAND: CPT

## 2019-10-22 ASSESSMENT — ENCOUNTER SYMPTOMS
COLOR CHANGE: 0
SHORTNESS OF BREATH: 0
TROUBLE SWALLOWING: 0
ABDOMINAL PAIN: 0

## 2019-11-19 ENCOUNTER — OFFICE VISIT (OUTPATIENT)
Dept: INTERNAL MEDICINE CLINIC | Age: 69
End: 2019-11-19
Payer: MEDICARE

## 2019-11-19 VITALS
SYSTOLIC BLOOD PRESSURE: 108 MMHG | HEIGHT: 67 IN | DIASTOLIC BLOOD PRESSURE: 57 MMHG | OXYGEN SATURATION: 100 % | WEIGHT: 139.4 LBS | RESPIRATION RATE: 16 BRPM | BODY MASS INDEX: 21.88 KG/M2 | HEART RATE: 79 BPM | TEMPERATURE: 98.2 F

## 2019-11-19 DIAGNOSIS — M79.642 BILATERAL HAND PAIN: ICD-10-CM

## 2019-11-19 DIAGNOSIS — M79.641 BILATERAL HAND PAIN: ICD-10-CM

## 2019-11-19 DIAGNOSIS — F41.9 INSOMNIA SECONDARY TO ANXIETY: ICD-10-CM

## 2019-11-19 DIAGNOSIS — F51.05 INSOMNIA SECONDARY TO ANXIETY: ICD-10-CM

## 2019-11-19 DIAGNOSIS — M19.049 LOCALIZED OSTEOARTHRITIS OF HAND, UNSPECIFIED LATERALITY: Primary | ICD-10-CM

## 2019-11-19 PROCEDURE — 99213 OFFICE O/P EST LOW 20 MIN: CPT | Performed by: INTERNAL MEDICINE

## 2019-11-19 RX ORDER — DOXEPIN HYDROCHLORIDE 10 MG/1
10 CAPSULE ORAL NIGHTLY
Qty: 30 CAPSULE | Refills: 3 | Status: SHIPPED | OUTPATIENT
Start: 2019-11-19 | End: 2020-01-14

## 2019-11-19 RX ORDER — ASPIRIN 81 MG/1
81 TABLET ORAL WEEKLY
Qty: 12 TABLET | Refills: 0
Start: 2019-11-19

## 2019-11-19 ASSESSMENT — ENCOUNTER SYMPTOMS
RESPIRATORY NEGATIVE: 1
GASTROINTESTINAL NEGATIVE: 1
BACK PAIN: 0

## 2019-11-21 ENCOUNTER — TELEPHONE (OUTPATIENT)
Dept: INTERNAL MEDICINE CLINIC | Age: 69
End: 2019-11-21

## 2019-11-26 ENCOUNTER — HOSPITAL ENCOUNTER (OUTPATIENT)
Dept: OCCUPATIONAL THERAPY | Age: 69
Setting detail: THERAPIES SERIES
Discharge: HOME | End: 2019-11-26
Payer: MEDICARE

## 2019-11-26 PROCEDURE — 97166 OT EVAL MOD COMPLEX 45 MIN: CPT

## 2019-12-03 ENCOUNTER — APPOINTMENT (OUTPATIENT)
Dept: PHYSICAL THERAPY | Age: 69
End: 2019-12-03
Payer: MEDICARE

## 2019-12-17 ENCOUNTER — CLINICAL DOCUMENTATION (OUTPATIENT)
Dept: OCCUPATIONAL THERAPY | Age: 69
End: 2019-12-17

## 2020-01-07 ENCOUNTER — OFFICE VISIT (OUTPATIENT)
Dept: FAMILY MEDICINE CLINIC | Age: 70
End: 2020-01-07
Payer: MEDICARE

## 2020-01-07 VITALS
OXYGEN SATURATION: 97 % | BODY MASS INDEX: 22.76 KG/M2 | HEART RATE: 90 BPM | HEIGHT: 67 IN | WEIGHT: 145 LBS | TEMPERATURE: 98.5 F | RESPIRATION RATE: 11 BRPM | DIASTOLIC BLOOD PRESSURE: 68 MMHG | SYSTOLIC BLOOD PRESSURE: 114 MMHG

## 2020-01-07 PROCEDURE — 99213 OFFICE O/P EST LOW 20 MIN: CPT | Performed by: INTERNAL MEDICINE

## 2020-01-07 ASSESSMENT — ENCOUNTER SYMPTOMS
SHORTNESS OF BREATH: 0
EYE PAIN: 0
ABDOMINAL PAIN: 0
BACK PAIN: 0

## 2020-01-07 NOTE — PROGRESS NOTES
Subjective:      Patient ID: Dory Dawson is a 77 y.o. male who presents today with:  Chief Complaint   Patient presents with   [address]     is not taking doxepin but does not want off list       HPI     Here for new to doctor. Tobacco user.      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     right index    Epididymitis, left 2011    Foot drop, left foot     H/O cervical spine surgery     minimal invasive surgery, [place]    H/O Kailash's syndrome 2018    post cervical surgery    History of depression 2004    due to work accident   Eduardo Hyattsamantha Hx of cervical spine surgery 2019    in [place], [facility], Dr Dee Dee Bishop, cervical stenosis reduced to mild    Hx of neck injury     pain management Dr Sheri Schmitt S/P colonoscopy with polypectomy 2015    Dr Serge Boyd, Dr Su Hassan S/P spinal surgery 2011    [facility]    Smoking      Past Surgical History:   Procedure Laterality Date    COLONOSCOPY  2015    BIBI SEBASTIAN M.D.   [facility],     [facility], Dr Mata Martinez [facility])     Social History     Socioeconomic History    Marital status:      Spouse name: Not on file    Number of children: 1    Years of education: Not on file    Highest education level: Not on file   Occupational History    Occupation: retired [employer]   Social Needs    Financial resource strain: Not on file    Food insecurity:     Worry: Not on file     Inability: Not on file   [employer] needs:     Medical: Not on file     Non-medical: Not on file   Tobacco Use    Smoking status: Current Some Day Smoker     Packs/day: 0.15     Years: 40.00     Pack years: 6.00     Types: Cigarettes     Last attempt to quit: 3/1/2019     Years since quittin.8    Smokeless tobacco: Never Used    Tobacco comment: working on cutting back, will try the electronic cigarette   Substance and Sexual Activity    Alcohol use: No    Drug use: No    Sexual activity: Not on file   Lifestyle    Physical activity:     Days per week: Not on file     Minutes per session: Not on file    Stress: Not on file   Relationships    Social connections:     Talks on phone: Not on file     Gets together: Not on file     Attends Bahai service: Not on file     Active member of club or organization: Not on file     Attends meetings of clubs or organizations: Not on file     Relationship status: Not on file    Intimate partner violence:     Fear of current or ex partner: Not on file     Emotionally abused: Not on file     Physically abused: Not on file     Forced sexual activity: Not on file   Other Topics Concern    Not on file   Social History Narrative    Born in [facility], 1 brother and 3 sisters, keeps in touch with brother     Virgiina Sena came from [place]     , worked at [employer] x 43 years, /, got injured in 2004        Lives at home with spouse, one son, keeps in touch    (Was on [employer] comp till 2012)    [address] housework, 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[email or web address]), travel (has been all over the [place])-stopped 2007     No Known Allergies  Current Outpatient Medications on File Prior to Visit   Medication Sig Dispense Refill    aspirin 81 MG EC tablet Take 1 tablet by mouth once a week (Patient not taking: Reported on 1/7/2020) 12 tablet 0    doxepin (SINEQUAN) 10 MG capsule Take 1 capsule by mouth nightly (Patient not taking: Reported on 1/7/2020) 30 capsule 3    diclofenac sodium 1 % GEL Apply 2 g topically 4 times daily (Patient not taking: Reported on 1/7/2020) 2 Tube 1     No current facility-administered medications on file prior to visit. I have personally reviewed the ROS, PMH, PFH, and social history     Review of Systems   Constitutional: Negative for chills. HENT: Negative for congestion. Eyes: Negative for pain. Respiratory: Negative for shortness of breath.     Cardiovascular: Negative for chest pain. Gastrointestinal: Negative for abdominal pain. Genitourinary: Negative for hematuria. Musculoskeletal: Negative for back pain. Allergic/Immunologic: Negative for immunocompromised state. Neurological: Negative for headaches. Psychiatric/Behavioral: Negative for hallucinations. Objective:   /68   Pulse 90   Temp 98.5 °F (36.9 °C)   Resp 11   Ht 5' 7\" (1.702 m)   Wt 145 lb (65.8 kg)   SpO2 97%   BMI 22.71 kg/m²     Physical Exam  Constitutional:       Appearance: He is well-developed. HENT:      Head: Normocephalic. Eyes:      Pupils: Pupils are equal, round, and reactive to light. Neck:      Vascular: No carotid bruit. Trachea: No tracheal deviation. Cardiovascular:      Rate and Rhythm: Normal rate and regular rhythm. Pulses: Normal pulses. Heart sounds: Normal heart sounds. No murmur. No friction rub. No gallop. Pulmonary:      Effort: No respiratory distress. Breath sounds: No wheezing or rhonchi. Abdominal:      General: Bowel sounds are normal. There is no distension. Palpations: Abdomen is soft. Tenderness: There is no tenderness. There is no guarding or rebound. Musculoskeletal:      Right lower leg: No edema. Left lower leg: No edema. Skin:     General: Skin is warm and dry. Neurological:      Mental Status: He is oriented to person, place, and time. Assessment:       Diagnosis Orders   1. Tobacco use  US SCREENING FOR AAA   2. Annual physical exam  CBC Auto Differential    Comprehensive Metabolic Panel    Lipid Panel    TSH with Reflex   3. Screening for colon cancer  Milly Burgos MD, GastroenterologySheri   4.  Screening for prostate cancer  PSA Screening         Plan:    VC  Counseled to quit tobacco.   US AAA  US AAA ordered, understands risk of rupture and almost always fatal if so  GI referral for colon cancer  Fasting labs  Patient understands prostate cancer screening is

## 2020-01-14 ENCOUNTER — TELEPHONE (OUTPATIENT)
Dept: ENDOSCOPY | Age: 70
End: 2020-01-14

## 2020-01-14 ENCOUNTER — OFFICE VISIT (OUTPATIENT)
Dept: INTERNAL MEDICINE CLINIC | Age: 70
End: 2020-01-14
Payer: MEDICARE

## 2020-01-14 VITALS
BODY MASS INDEX: 23.29 KG/M2 | RESPIRATION RATE: 16 BRPM | SYSTOLIC BLOOD PRESSURE: 100 MMHG | WEIGHT: 148.4 LBS | OXYGEN SATURATION: 98 % | DIASTOLIC BLOOD PRESSURE: 60 MMHG | TEMPERATURE: 97.4 F | HEART RATE: 73 BPM | HEIGHT: 67 IN

## 2020-01-14 PROCEDURE — 99212 OFFICE O/P EST SF 10 MIN: CPT | Performed by: INTERNAL MEDICINE

## 2020-01-14 RX ORDER — DOXEPIN HYDROCHLORIDE 3 MG/1
3 TABLET ORAL NIGHTLY PRN
Qty: 30 TABLET | Refills: 1 | Status: SHIPPED | OUTPATIENT
Start: 2020-01-14 | End: 2020-05-01

## 2020-01-14 ASSESSMENT — ENCOUNTER SYMPTOMS
RESPIRATORY NEGATIVE: 1
BACK PAIN: 0
GASTROINTESTINAL NEGATIVE: 1

## 2020-01-14 ASSESSMENT — PATIENT HEALTH QUESTIONNAIRE - PHQ9
1. LITTLE INTEREST OR PLEASURE IN DOING THINGS: 0
SUM OF ALL RESPONSES TO PHQ9 QUESTIONS 1 & 2: 0
SUM OF ALL RESPONSES TO PHQ QUESTIONS 1-9: 0
SUM OF ALL RESPONSES TO PHQ QUESTIONS 1-9: 0
2. FEELING DOWN, DEPRESSED OR HOPELESS: 0

## 2020-01-14 NOTE — PROGRESS NOTES
since quittin.8    Smokeless tobacco: Never Used    Tobacco comment: working on cutting back, will try the electronic cigarette   Substance and Sexual Activity    Alcohol use: No    Drug use: No    Sexual activity: Not on file   Lifestyle    Physical activity:     Days per week: Not on file     Minutes per session: Not on file    Stress: Not on file   Relationships    Social connections:     Talks on phone: Not on file     Gets together: Not on file     Attends Zoroastrian service: Not on file     Active member of club or organization: Not on file     Attends meetings of clubs or organizations: Not on file     Relationship status: Not on file    Intimate partner violence:     Fear of current or ex partner: Not on file     Emotionally abused: Not on file     Physically abused: Not on file     Forced sexual activity: Not on file   Other Topics Concern    Not on file   Social History Narrative    Born in [facility], 1 brother and 3 sisters, keeps in touch with brother     Juan Guzman came from [place]     , worked at [employer] x 43 years, /, got injured in         Lives at home with spouse, one son, keeps in touch    (Was on [employer] comp till )    [employer] housework, 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 ALLERGIES    Patient has no known allergies. Current Outpatient Medications on File Prior to Visit   Medication Sig Dispense Refill    aspirin 81 MG EC tablet Take 1 tablet by mouth once a week 12 tablet 0    diclofenac sodium 1 % GEL Apply 2 g topically 4 times daily 2 Tube 1     No current facility-administered medications on file prior to visit.         Review of Systems Constitutional: Negative for fatigue and unexpected weight change. HENT: Negative. Respiratory: Negative. Cardiovascular: Negative. Gastrointestinal: Negative. Endocrine: Negative for cold intolerance and heat intolerance. Genitourinary: Negative. Musculoskeletal: Positive for neck stiffness. Negative for back pain, gait problem, joint swelling and neck pain. Skin: Negative for rash. Neurological: Negative for dizziness, tremors, weakness, light-headedness, numbness and headaches. Psychiatric/Behavioral: Positive for sleep disturbance. Negative for decreased concentration and dysphoric mood. The patient is nervous/anxious. Vitals:    01/14/20 1040 01/14/20 1044 01/14/20 1112   BP: (!) 92/53 (!) 92/50 100/60   Site: Right Upper Arm Left Upper Arm    Position: Sitting Sitting    Cuff Size: Medium Adult Medium Adult    Pulse: 73     Resp: 16     Temp: 97.4 °F (36.3 °C)     TempSrc: Tympanic     SpO2: 98%     Weight: 148 lb 6.4 oz (67.3 kg)     Height: 5' 7\" (1.702 m)         Physical Exam  Constitutional:       General: He is not in acute distress. Appearance: Normal appearance. Comments: Thinly built, age appropriate     HENT:      Head: Atraumatic. Eyes:      General: No scleral icterus. Neck:      Musculoskeletal: Neck supple. No neck rigidity. Thyroid: No thyroid mass. Comments: The surgical scars of the right and left side of the anterior neck are very well-healed, barely noticeable  Cardiovascular:      Pulses: Normal pulses. Heart sounds: Normal heart sounds. Pulmonary:      Effort: Pulmonary effort is normal. No respiratory distress. Abdominal:      General: There is no distension.    Musculoskeletal:      Comments: Exam of the right hand reveals partial index amputation, osteoarthritic changes in all finger MCP joints  Exam of the left hand reveals thickened palmar fascia, tenderness and nodularity with palpation of the flexor tendon of the third digit, marked osteoarthritic deformities of all finger, MCP joints  No significant changes from the last visit   Skin:     General: Skin is warm. Neurological:      Mental Status: He is oriented to person, place, and time. Mental status is at baseline. Motor: No weakness. Coordination: Coordination normal.      Gait: Gait normal.   Psychiatric:         Attention and Perception: Attention normal.         Mood and Affect: Mood is anxious. Mood is not depressed. Affect is not labile. Speech: Speech is rapid and pressured. Behavior: Behavior is not slowed or withdrawn. Behavior is cooperative. Thought Content: Thought content is not delusional.         Cognition and Memory: Cognition and memory normal.         Judgment: Judgment is not inappropriate. Comments: Good insight and motivation         Assessment:    Negin Ochoa was seen today for insomnia. Diagnoses and all orders for this visit:    Other insomnia             In the setting of a history of remote depression, anxiety, compounded by ongoing cervical spine issues. Will try a lower dose of doxepin, if same side effects of oversedation will try trazodone. Other orders  -     doxepin (SILENOR) 3 MG TABS tablet; Take 1 tablet by mouth nightly as needed (insomnia)        Plan:    See all orders and comments in the assessment section. Reviewed with the patient (/and caregiver if present): current health status, medications, activities and diet. Today's diagnosis (in the context ofchronic problems) was discussed with patient (/and caregiver if present), questions answered. Side effects, adverse effects of the medication prescribed (or refilled) today, treatment plan/ rationale and result expectations have (again) been discussed with the patient who expresses understanding and consents to proceed as outlined above. Additional advise included in the \"after visit summary\".        Orders Placed This Encounter   Medications    doxepin (SILENOR) 3 MG TABS tablet     Sig: Take 1 tablet by mouth nightly as needed (insomnia)     Dispense:  30 tablet     Refill:  1       Close follow up needed to evaluate treatment results and for care coordination. Return if symptoms worsen or fail to improve. I have reviewed the patient's medical and surgical, family and social history, health maintenance schedule, and updated the computerized patient record. Please note this report has been partially produced by using speech recognition hardware. It may contain errors related to the system, including grammar, punctuation and spelling as well as words and phrases that may seem inaccurate. For anyquestions or concerns, please feel free to contact me for clarification.         Electronically signed by Ronnell Nash MD

## 2020-01-15 ENCOUNTER — TELEPHONE (OUTPATIENT)
Dept: CASE MANAGEMENT | Age: 70
End: 2020-01-15

## 2020-01-20 ENCOUNTER — HOSPITAL ENCOUNTER (OUTPATIENT)
Dept: ULTRASOUND IMAGING | Age: 70
Discharge: HOME | End: 2020-01-22
Payer: MEDICARE

## 2020-01-20 ENCOUNTER — HOSPITAL ENCOUNTER (OUTPATIENT)
Dept: CT IMAGING | Age: 70
Discharge: HOME | End: 2020-01-22
Payer: MEDICARE

## 2020-01-20 DIAGNOSIS — Z12.5 SCREENING FOR PROSTATE CANCER: ICD-10-CM

## 2020-01-20 DIAGNOSIS — Z00.00 ANNUAL PHYSICAL EXAM: ICD-10-CM

## 2020-01-20 LAB
ALBUMIN SERPL-MCNC: 4.2 G/DL (ref 3.5–4.6)
ALP BLD-CCNC: 75 U/L (ref 35–104)
ALT SERPL-CCNC: 21 U/L (ref 0–41)
ANION GAP SERPL CALCULATED.3IONS-SCNC: 16 MEQ/L (ref 9–15)
AST SERPL-CCNC: 24 U/L (ref 0–40)
BASOPHILS ABSOLUTE: 0.1 K/UL (ref 0–0.2)
BASOPHILS RELATIVE PERCENT: 0.6 %
BILIRUB SERPL-MCNC: 0.6 MG/DL (ref 0.2–0.7)
BUN BLDV-MCNC: 14 MG/DL (ref 8–23)
CALCIUM SERPL-MCNC: 9 MG/DL (ref 8.5–9.9)
CHLORIDE BLD-SCNC: 99 MEQ/L (ref 95–107)
CHOLESTEROL, TOTAL: 168 MG/DL (ref 0–199)
CO2: 26 MEQ/L (ref 20–31)
CREAT SERPL-MCNC: 0.82 MG/DL (ref 0.7–1.2)
EOSINOPHILS ABSOLUTE: 0.3 K/UL (ref 0–0.7)
EOSINOPHILS RELATIVE PERCENT: 4.1 %
GFR AFRICAN AMERICAN: >60
GFR NON-AFRICAN AMERICAN: >60
GLOBULIN: 2.6 G/DL (ref 2.3–3.5)
GLUCOSE BLD-MCNC: 77 MG/DL (ref 70–99)
HCT VFR BLD CALC: 44.5 % (ref 42–52)
HDLC SERPL-MCNC: 76 MG/DL (ref 40–59)
HEMOGLOBIN: 15.4 G/DL (ref 14–18)
LDL CHOLESTEROL CALCULATED: 81 MG/DL (ref 0–129)
LYMPHOCYTES ABSOLUTE: 2 K/UL (ref 1–4.8)
LYMPHOCYTES RELATIVE PERCENT: 24.7 %
MCH RBC QN AUTO: 33.9 PG (ref 27–31.3)
MCHC RBC AUTO-ENTMCNC: 34.7 % (ref 33–37)
MCV RBC AUTO: 97.7 FL (ref 80–100)
MONOCYTES ABSOLUTE: 0.7 K/UL (ref 0.2–0.8)
MONOCYTES RELATIVE PERCENT: 8.4 %
NEUTROPHILS ABSOLUTE: 5 K/UL (ref 1.4–6.5)
NEUTROPHILS RELATIVE PERCENT: 62.2 %
PDW BLD-RTO: 12.8 % (ref 11.5–14.5)
PLATELET # BLD: 331 K/UL (ref 130–400)
POTASSIUM SERPL-SCNC: 4.2 MEQ/L (ref 3.4–4.9)
PROSTATE SPECIFIC ANTIGEN: 0.91 NG/ML (ref 0–5.4)
RBC # BLD: 4.55 M/UL (ref 4.7–6.1)
SODIUM BLD-SCNC: 141 MEQ/L (ref 135–144)
TOTAL PROTEIN: 6.8 G/DL (ref 6.3–8)
TRIGL SERPL-MCNC: 56 MG/DL (ref 0–150)
TSH REFLEX: 1.94 UIU/ML (ref 0.44–3.86)
WBC # BLD: 8 K/UL (ref 4.8–10.8)

## 2020-01-20 PROCEDURE — 76706 US ABDL AORTA SCREEN AAA: CPT

## 2020-01-20 PROCEDURE — G0297 LDCT FOR LUNG CA SCREEN: HCPCS

## 2020-02-04 ENCOUNTER — OFFICE VISIT (OUTPATIENT)
Dept: FAMILY MEDICINE CLINIC | Age: 70
End: 2020-02-04
Payer: MEDICARE

## 2020-02-04 ENCOUNTER — TELEPHONE (OUTPATIENT)
Dept: FAMILY MEDICINE CLINIC | Age: 70
End: 2020-02-04

## 2020-02-04 VITALS
RESPIRATION RATE: 15 BRPM | OXYGEN SATURATION: 98 % | HEART RATE: 92 BPM | HEIGHT: 67 IN | TEMPERATURE: 98.3 F | WEIGHT: 146 LBS | BODY MASS INDEX: 22.91 KG/M2 | SYSTOLIC BLOOD PRESSURE: 114 MMHG | DIASTOLIC BLOOD PRESSURE: 74 MMHG

## 2020-02-04 DIAGNOSIS — Z11.59 ENCOUNTER FOR HCV SCREENING TEST FOR LOW RISK PATIENT: ICD-10-CM

## 2020-02-04 LAB — HEPATITIS C ANTIBODY INTERPRETATION: NORMAL

## 2020-02-04 PROCEDURE — 99214 OFFICE O/P EST MOD 30 MIN: CPT | Performed by: INTERNAL MEDICINE

## 2020-02-04 PROCEDURE — 93000 ELECTROCARDIOGRAM COMPLETE: CPT | Performed by: INTERNAL MEDICINE

## 2020-02-04 ASSESSMENT — ENCOUNTER SYMPTOMS
ABDOMINAL PAIN: 0
BACK PAIN: 0
SHORTNESS OF BREATH: 0
EYE PAIN: 0

## 2020-02-04 NOTE — PROGRESS NOTES
Subjective:      Patient ID: Grisel Haynes. is a 77 y.o. male who presents today with:  Chief Complaint   Patient presents with    Follow-up    Discuss Labs       HPI     Insomnia-Chronic issue, recently started on doxepin 3 mg through Dr. Misti Gutierrez. Denies side effects. He's actually take 1/2 tablet daily. No si/hi    Lung nodule, Right long, enlarged from 0.28 cm to 0.49 cm. Right lung. Still does smoke. Chronic, not on statin therapy. Male Sex. Known tobacco user. Mentions chronic nerve pain. After cervical/thoracic spinal surgery. This has been an ongoing issue for him. No recent falls or trauma. No passing out, no chest pains.      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   Dhara Sell, 2011    [facility], Dr Eve [facility])   [facility]      March 2018, May 2019, [facility]      Social History     Socioeconomic History    Marital status:      Spouse name: Not on file    Number of children: 1    Years of education: Not on file    Highest education level: Not on file   Occupational History    Occupation: retired [employer]   Social Needs    Financial resource strain: Not on file    Food insecurity:     Worry: Not on file     Inability: Not on file  Transportation needs:     Medical: Not on file     Non-medical: Not on file   Tobacco Use    Smoking status: Current Some Day Smoker     Packs/day: 1.50     Years: 30.00     Pack years: 45.00     Types: Cigarettes    Smokeless tobacco: Never Used    Tobacco comment: working on cutting back, will try the electronic cigarette   Substance and Sexual Activity    Alcohol use: No    Drug use: No    Sexual activity: Not on file   Lifestyle    Physical activity:     Days per week: Not on file     Minutes per session: Not on file    Stress: Not on file   Relationships    Social connections:     Talks on phone: Not on file     Gets together: Not on file     Attends Orthodox service: Not on file     Active member of club or organization: Not on file     Attends meetings of clubs or organizations: Not on file     Relationship status: Not on file    Intimate partner violence:     Fear of current or ex partner: Not on file     Emotionally abused: Not on file     Physically abused: Not on file     Forced sexual activity: Not on file   Other Topics Concern    Not on file   Social History Narrative    Born in [facility], 1 brother and 3 sisters, keeps in touch with brother     Claudia Dawson came from [place]     , worked at [employer] x 43 years, /, got injured in 2004        Lives at home with spouse, one son, keeps in touch    (Was on [employer] comp till 2012)    Hobbies housework, 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[address]), travel (has been all over the [place])-stopped 2007     No Known Allergies  Current Outpatient Medications on File Prior to Visit   Medication Sig Dispense Refill    doxepin (SILENOR) 3 MG TABS tablet Take 1 tablet by mouth nightly as needed (insomnia) 30 tablet 1    aspirin 81 MG EC tablet Take 1 tablet by mouth once a week 12 tablet 0    diclofenac sodium 1 % GEL Apply 2 g topically 4 times daily 2 Tube 1     No current facility-administered medications SPINE W WO CONTRAST     Standing Status:   Future     Standing Expiration Date:   2/4/2021     Order Specific Question:   Reason for exam:     Answer:   thoracic radiculopathy    Hepatitis C Antibody     Standing Status:   Future     Standing Expiration Date:   2/4/2021   Enoc Cabezas MD, Pulmonology, [place]     Referral Priority:   Routine     Referral Type:   Eval and Treat     Referral Reason:   Specialty Services Required     Referred to Provider:   Con Byers MD     Requested Specialty:   Pulmonology     Number of Visits Requested:   1    Ambulatory referral to Neurosurgery     Referral Priority:   Routine     Referral Type:   Eval and Treat     Referral Reason:   Specialty Services Required     Referred to Provider:   David Robles MD     Requested Specialty:   Neurosurgery     Number of Visits Requested:   1    EMG     Standing Status:   Future     Standing Expiration Date:   4/4/2020     Order Specific Question:   Which body part? Answer:   bilateral upper extremities     No orders of the defined types were placed in this encounter. Return in about 3 months (around 5/4/2020) for worsening symptoms, call ASAP for appointment, Chronic condition management/appointment. Edmundo Burton MD    If anything should change or worsen call ASAP, don't wait for next scheduled appointment.

## 2020-02-11 ENCOUNTER — OFFICE VISIT (OUTPATIENT)
Dept: FAMILY MEDICINE CLINIC | Age: 70
End: 2020-02-11
Payer: MEDICARE

## 2020-02-11 ENCOUNTER — TELEPHONE (OUTPATIENT)
Dept: FAMILY MEDICINE CLINIC | Age: 70
End: 2020-02-11

## 2020-02-11 VITALS
HEART RATE: 70 BPM | OXYGEN SATURATION: 99 % | HEIGHT: 67 IN | SYSTOLIC BLOOD PRESSURE: 124 MMHG | RESPIRATION RATE: 15 BRPM | TEMPERATURE: 98.3 F | WEIGHT: 146 LBS | BODY MASS INDEX: 22.91 KG/M2 | DIASTOLIC BLOOD PRESSURE: 68 MMHG

## 2020-02-11 DIAGNOSIS — I49.3 PVC (PREMATURE VENTRICULAR CONTRACTION): ICD-10-CM

## 2020-02-11 LAB
MAGNESIUM: 2.3 MG/DL (ref 1.7–2.4)
PHOSPHORUS: 3.4 MG/DL (ref 2.3–4.8)

## 2020-02-11 PROCEDURE — 93000 ELECTROCARDIOGRAM COMPLETE: CPT | Performed by: INTERNAL MEDICINE

## 2020-02-11 PROCEDURE — 99213 OFFICE O/P EST LOW 20 MIN: CPT | Performed by: INTERNAL MEDICINE

## 2020-02-11 ASSESSMENT — ENCOUNTER SYMPTOMS
BACK PAIN: 0
SHORTNESS OF BREATH: 0
EYE PAIN: 0
ABDOMINAL PAIN: 0

## 2020-02-11 NOTE — PROGRESS NOTES
Subjective:      Patient ID: Gregorio Marvin is a 77 y.o. male who presents today with:  Chief Complaint   Patient presents with    Follow-up    Discuss Labs       HPI       Here for pvc found incidentally on ekg  He denies chest pain, shortness of breath, passing out, etc. He can walk fast without any issues. He drinks about 3 cans of pepsi a day. Cup and half in the morning. No family history of sudden cardiac death. No personal history of heart disease.      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     right index    Epididymitis, left 2011    Foot drop, left foot     H/O cervical spine surgery     minimal invasive surgery, [facility]    H/O Kailash's syndrome 2018    post cervical surgery    History of depression 2004    due to work accident    Hx of cervical spine surgery 2019    in [place], [address] [place] [address], Dr Wendy Sweet, cervical stenosis reduced to mild    Hx of neck injury 2001    pain management Dr Gurvinder Nunez S/P colonoscopy with polypectomy 2010, 2015    Dr Sarah Braun, Dr Tutu Corbett S[facility]P spinal surgery [facility]    [facility]    Smoking      Past Surgical History:   Procedure Laterality Date    COLONOSCOPY  2/17/2015    FLOR TIRADO, 2011    [facility], Dr Janice Pizarro [facility])   [facility]      March 2018, May 2019, [facility]      Social History     Socioeconomic History    Marital status:      Spouse name: Not on file    Number of children: 1    Years of education: Not on file    Highest education level: Not on file   Occupational History    Occupation: retired [employer]   Social Needs    Financial resource strain: Not on file    Food insecurity:     Worry: Not on file     Inability: Not on file   Storyful needs:     Medical: Not on file     Non-medical: Not on file   Tobacco Use    Smoking status: Current Some Day Smoker     Packs/day: 1.50     Years: 30.00     Pack years: 45.00     Types: Cigarettes    Smokeless tobacco: Never Used    Tobacco comment: working on cutting back, will try the electronic cigarette   Substance and Sexual Activity    Alcohol use: No    Drug use: No    Sexual activity: Not on file   Lifestyle    Physical activity:     Days per week: Not on file     Minutes per session: Not on file    Stress: Not on file   Relationships    Social connections:     Talks on phone: Not on file     Gets together: Not on file     Attends Anabaptism service: Not on file     Active member of club or organization: Not on file     Attends meetings of clubs or organizations: Not on file     Relationship status: Not on file    Intimate partner violence:     Fear of current or ex partner: Not on file     Emotionally abused: Not on file     Physically abused: Not on file     Forced sexual activity: Not on file   Other Topics Concern    Not on file   Social History Narrative    Born in [facility], 1 brother and 3 sisters, keeps in touch with brother     Aniya Sousa came from [place]     , worked at [employer] x 43 years, /, got injured in 2004        Lives at home with spouse, one son, keeps in touch    (Was on [employer] comp till 2012)    Hobbies housework, 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 I have personally reviewed the ROS, PMH, PFH, and social history     Review of Systems   Constitutional: Negative for chills.    HENT: Negative for

## 2020-02-13 ENCOUNTER — HOSPITAL ENCOUNTER (OUTPATIENT)
Dept: MRI IMAGING | Age: 70
Discharge: HOME | End: 2020-02-15
Payer: MEDICARE

## 2020-02-13 PROCEDURE — 72156 MRI NECK SPINE W/O & W/DYE: CPT

## 2020-02-13 PROCEDURE — A9579 GAD-BASE MR CONTRAST NOS,1ML: HCPCS | Performed by: INTERNAL MEDICINE

## 2020-02-13 PROCEDURE — 6360000004 HC RX CONTRAST MEDICATION: Performed by: INTERNAL MEDICINE

## 2020-02-13 PROCEDURE — 72157 MRI CHEST SPINE W/O & W/DYE: CPT

## 2020-02-13 RX ORDER — SODIUM CHLORIDE 0.9 % (FLUSH) 0.9 %
10 SYRINGE (ML) INJECTION 2 TIMES DAILY
Status: DISCONTINUED | OUTPATIENT
Start: 2020-02-13 | End: 2020-02-16 | Stop reason: HOSPADM

## 2020-02-13 RX ADMIN — GADOTERIDOL 15 ML: 279.3 INJECTION, SOLUTION INTRAVENOUS at 16:13

## 2020-02-14 ENCOUNTER — OFFICE VISIT (OUTPATIENT)
Dept: INTERNAL MEDICINE CLINIC | Age: 70
End: 2020-02-14
Payer: MEDICARE

## 2020-02-14 VITALS
BODY MASS INDEX: 22.71 KG/M2 | WEIGHT: 145 LBS | OXYGEN SATURATION: 94 % | HEART RATE: 85 BPM | TEMPERATURE: 97.8 F | SYSTOLIC BLOOD PRESSURE: 118 MMHG | DIASTOLIC BLOOD PRESSURE: 60 MMHG | RESPIRATION RATE: 12 BRPM

## 2020-02-14 PROCEDURE — 99212 OFFICE O/P EST SF 10 MIN: CPT | Performed by: INTERNAL MEDICINE

## 2020-02-14 ASSESSMENT — ENCOUNTER SYMPTOMS
GASTROINTESTINAL NEGATIVE: 1
RESPIRATORY NEGATIVE: 1
BACK PAIN: 0

## 2020-02-20 ENCOUNTER — TELEPHONE (OUTPATIENT)
Dept: FAMILY MEDICINE CLINIC | Age: 70
End: 2020-02-20

## 2020-02-20 ENCOUNTER — HOSPITAL ENCOUNTER (OUTPATIENT)
Dept: ULTRASOUND IMAGING | Age: 70
Discharge: HOME | End: 2020-02-22
Payer: MEDICARE

## 2020-02-20 PROCEDURE — 76536 US EXAM OF HEAD AND NECK: CPT

## 2020-02-21 NOTE — TELEPHONE ENCOUNTER
A bruit was heard, that would warrant a carotid US, please order carotid US arterial bilateral. I do not recommend a carotid US as a work up for PVC. If persistent, or with symptoms a Holter monitor is the work up for pvcs.  Thank you

## 2020-02-21 NOTE — TELEPHONE ENCOUNTER
Noted please thank him for letting me know, and I will see him for follow up after. Good luck with surgery.

## 2020-02-25 ENCOUNTER — HOSPITAL ENCOUNTER (OUTPATIENT)
Dept: ULTRASOUND IMAGING | Age: 70
Discharge: HOME | End: 2020-02-27
Payer: MEDICARE

## 2020-02-25 PROCEDURE — 93880 EXTRACRANIAL BILAT STUDY: CPT

## 2020-03-23 ENCOUNTER — VIRTUAL VISIT (OUTPATIENT)
Dept: PULMONOLOGY | Age: 70
End: 2020-03-23
Payer: MEDICARE

## 2020-03-23 PROCEDURE — 99443 PR PHYS/QHP TELEPHONE EVALUATION 21-30 MIN: CPT | Performed by: INTERNAL MEDICINE

## 2020-03-23 NOTE — PROGRESS NOTES
Patient referred to pulmonary clinic, he reports no chest pain, no shortness of breath, no coughing, no weight loss, no heartburn, no nasal congestion or postnasal drip. He had a CT lung cancer screening shows small right upper lobe lung nodule. He is a smoker three-quarter pack a day for 27 years, father had lung cancer at his 80s, patient worked in a [address] [place] [address] [employer] in the past, with asbestos exposure. Diagnosis Orders   1. Lung nodule  CT CHEST WO CONTRAST   2. Smoking     3. Asbestos exposure       Lung nodule right upper lobe, CT reviewed by me to my eye it appears stable compared to 2017 however due to reported increase of size I will repeat CT in 6 months. Asbestos exposure, currently no signs of asbestos lung disease on CAT scan will continue monitoring  Smoking cessation counseling done over the phone.     Total time 27 minutes

## 2020-05-01 RX ORDER — DOXEPIN HYDROCHLORIDE 3 MG/1
3 TABLET ORAL NIGHTLY PRN
Qty: 30 TABLET | Refills: 1 | Status: SHIPPED | OUTPATIENT
Start: 2020-05-01 | End: 2020-08-12

## 2020-05-04 ENCOUNTER — VIRTUAL VISIT (OUTPATIENT)
Dept: FAMILY MEDICINE CLINIC | Age: 70
End: 2020-05-04
Payer: MEDICARE

## 2020-05-04 ENCOUNTER — TELEPHONE (OUTPATIENT)
Dept: FAMILY MEDICINE CLINIC | Age: 70
End: 2020-05-04

## 2020-05-04 PROCEDURE — 99443 PR PHYS/QHP TELEPHONE EVALUATION 21-30 MIN: CPT | Performed by: INTERNAL MEDICINE

## 2020-05-04 ASSESSMENT — ENCOUNTER SYMPTOMS
BACK PAIN: 0
ABDOMINAL PAIN: 0
SHORTNESS OF BREATH: 0
EYE PAIN: 0

## 2020-05-04 NOTE — PROGRESS NOTES
Negative for chills. HENT: Negative for congestion. Eyes: Negative for pain. Respiratory: Negative for shortness of breath. Cardiovascular: Negative for chest pain. Gastrointestinal: Negative for abdominal pain. Genitourinary: Negative for hematuria. Musculoskeletal: Negative for back pain. Allergic/Immunologic: Negative for immunocompromised state. Neurological: Negative for headaches. Psychiatric/Behavioral: Negative for hallucinations. Objective: There were no vitals taken for this visit. Physical Exam    Unable to perform given telephone visit. Assessment:       Diagnosis Orders   1. Tobacco use     2. Primary insomnia     3. Thyroid nodule           Plan:   Refilled doxepin for his insomnia, he does well with it, denies side effects. Understands SSI can increase SI and to call immediately if this should occur. Review labs from [place] and feb 2020   No si/hi  Seeing pulmonary, ct ordered. Planning on seeing GI. Wants megan at next follow up. 10 year risk of ascvd is 12.3%  cac test not done, he can schedule. He wants to wait. Never did Holter, he refused. He follows up with [facility] neurosurgeons prn  Repeat thyroid us in august to follow, risk of cancer. EMG ordered not done. He doesn't want to pursue this due to pain. Carotid us clear. Telephone  visit done because of coronavirus pandemic. Time spent 27 minutes  explained billeable visit, patient understands and agrees to continue  I was at home and patient was at home during this visit. No orders of the defined types were placed in this encounter. No orders of the defined types were placed in this encounter. No follow-ups on file. Maye Villegas MD    If anything should change or worsen call ASAP, don't wait for next scheduled appointment.

## 2020-05-04 NOTE — PATIENT INSTRUCTIONS
Patient Education        Insomnia: Care Instructions  Your Care Instructions    Insomnia is the inability to sleep well. It is a common problem for most people at some time. Insomnia may make it hard for you to get to sleep, stay asleep, or sleep as long as you need to. This can make you tired and grouchy during the day. It can also make you forgetful, less effective at work, and unhappy. Insomnia can be caused by conditions such as depression or anxiety. Pain can also affect your ability to sleep. When these problems are solved, the insomnia usually clears up. But sometimes bad sleep habits can cause insomnia. If insomnia is affecting your work or your enjoyment of life, you can take steps to improve your sleep. Follow-up care is a key part of your treatment and safety. Be sure to make and go to all appointments, and call your doctor if you are having problems. It's also a good idea to know your test results and keep a list of the medicines you take. How can you care for yourself at home? What to avoid  · Do not have drinks with caffeine, such as coffee or black tea, for 8 hours before bed. · Do not smoke or use other types of tobacco near bedtime. Nicotine is a stimulant and can keep you awake. · Avoid drinking alcohol late in the evening, because it can cause you to wake in the middle of the night. · Do not eat a big meal close to bedtime. If you are hungry, eat a light snack. · Do not drink a lot of water close to bedtime, because the need to urinate may wake you up during the night. · Do not read or watch TV in bed. Use the bed only for sleeping and sexual activity. What to try  · Go to bed at the same time every night, and wake up at the same time every morning. Do not take naps during the day. · Keep your bedroom quiet, dark, and cool. · Sleep on a comfortable pillow and mattress. · If watching the clock makes you anxious, turn it facing away from you so you cannot see the time.   · If you about \"Thyroid Nodules: Care Instructions. \"     If you do not have an account, please click on the \"Sign Up Now\" link. Current as of: July 28, 2019Content Version: 12.4  © 0397-6824 [employer]. Care instructions adapted under license by [facility] ([facility]). If you have questions about a medical condition or this instruction, always ask your healthcare professional. [address] any warranty or liability for your use of this information.

## 2020-05-06 ENCOUNTER — TELEPHONE (OUTPATIENT)
Dept: MRI IMAGING | Age: 70
End: 2020-05-06

## 2020-05-11 ENCOUNTER — TELEPHONE (OUTPATIENT)
Dept: FAMILY MEDICINE CLINIC | Age: 70
End: 2020-05-11

## 2020-05-11 NOTE — TELEPHONE ENCOUNTER
Please call in doxepin capsules 1 mg take 3 capsules po qhs number 90 with 2 refills. I Don't see it in epic to order this way. Left him a message to call tonight should he need anything.

## 2020-05-11 NOTE — TELEPHONE ENCOUNTER
Aysha Vazquez @ [facility] called to follow up on the [employer] for [place] Minaya. He stated that when this was original written on 5/1, that he was only able to fill 15 for the patient. He indicated that he doesn't not have anymore to complete the script, and that he understands from his  that he ill not be able to get anymore until possibly mid-June, if that. He stated that the patient will be running out, and will need something to replace. He has doxepin in capsules - 1mg, or he has it in 5mg. He also indicated that the patient was adamant that his script state that it's for insomnia.       Aysha Vazquez may be reached at [phone]

## 2020-05-12 ENCOUNTER — TELEPHONE (OUTPATIENT)
Dept: FAMILY MEDICINE CLINIC | Age: 70
End: 2020-05-12

## 2020-05-12 RX ORDER — TRAZODONE HYDROCHLORIDE 50 MG/1
50 TABLET ORAL NIGHTLY
Qty: 30 TABLET | Refills: 1 | Status: SHIPPED | OUTPATIENT
Start: 2020-05-12 | End: 2021-02-05 | Stop reason: CLARIF

## 2020-05-12 NOTE — TELEPHONE ENCOUNTER
Patient is aware and said he will switch as long as the medication is not a capsule.  The pharmacy has no doxepin

## 2020-06-01 ENCOUNTER — TELEPHONE (OUTPATIENT)
Dept: FAMILY MEDICINE CLINIC | Age: 70
End: 2020-06-01

## 2020-06-18 ENCOUNTER — TELEPHONE (OUTPATIENT)
Dept: FAMILY MEDICINE CLINIC | Age: 70
End: 2020-06-18

## 2020-07-24 ENCOUNTER — HOSPITAL ENCOUNTER (OUTPATIENT)
Dept: CT IMAGING | Age: 70
Discharge: HOME | End: 2020-07-26
Payer: MEDICARE

## 2020-07-24 PROCEDURE — 71250 CT THORAX DX C-: CPT

## 2020-07-29 ENCOUNTER — TELEPHONE (OUTPATIENT)
Dept: PULMONOLOGY | Age: 70
End: 2020-07-29

## 2020-07-29 NOTE — TELEPHONE ENCOUNTER
Left message for patient to schedule a follow up appointment to go over CT results and treatment plan.

## 2020-07-30 ENCOUNTER — VIRTUAL VISIT (OUTPATIENT)
Dept: PULMONOLOGY | Age: 70
End: 2020-07-30
Payer: MEDICARE

## 2020-07-30 PROCEDURE — 99443 PR PHYS/QHP TELEPHONE EVALUATION 21-30 MIN: CPT | Performed by: INTERNAL MEDICINE

## 2020-07-30 ASSESSMENT — ENCOUNTER SYMPTOMS
EYES NEGATIVE: 1
GASTROINTESTINAL NEGATIVE: 1

## 2020-07-30 NOTE — PROGRESS NOTES
2020    TELEHEALTH EVALUATION -- Audio/Visual (During TDQZA-31 public health emergency)    Due to Femi 19 outbreak, patient's office visit was converted to a virtual visit. Patient was contacted and agreed to proceed with a virtual visit via Telephone Visit  The risks and benefits of converting to a virtual visit were discussed in light of the current infectious disease epidemic. Patient also understood that insurance coverage and co-pays are up to their individual insurance plans. HPI:    Brad Rodriguez (:  1950) has requested an audio/video evaluation for the following concern(s):    Patient called for CT results follow-up, he continues to smoke, however he is trying to cut down, no coughing, no chest pain, no shortness of breath, no lower extremity edema, no fever no chills, no nausea no vomiting. He has been staying home and avoiding social contact him. No nasal congestion postnasal drip and no heartburn. Review of Systems   Constitutional: Negative. HENT: Negative. Eyes: Negative. Gastrointestinal: Negative. Endocrine: Negative. Musculoskeletal: Negative. Skin: Negative. Neurological: Negative. Hematological: Negative. Psychiatric/Behavioral: Negative. Prior to Visit Medications    Medication Sig Taking?  Authorizing Provider   traZODone (DESYREL) 50 MG tablet Take 1 tablet by mouth nightly (call doctor before taking)  Atilio Seo MD   doxepin (SILENOR) 3 MG TABS tablet Take 1 tablet by mouth nightly as needed (insomnia)  Atilio Seo MD   aspirin 81 MG EC tablet Take 1 tablet by mouth once a week  Doris Villatoro MD   diclofenac sodium 1 % GEL Apply 2 g topically 4 times daily  Doris Villatoro MD       Social History     Tobacco Use    Smoking status: Current Some Day Smoker     Packs/day: 1.50     Years: 30.00     Pack years: 45.00     Types: Cigarettes    Smokeless tobacco: Never Used    Tobacco comment: working on cutting back, will try the electronic cigarette   Substance Use Topics    Alcohol use: No    Drug use: No        Allergies   Allergen Reactions    Elavil [Amitriptyline Hcl]      \"made him gaga\"    ,   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     right index    Foot drop, left foot     H/O cervical spine surgery     minimal invasive surgery, [place]    H/O Kailash's syndrome 2018    post cervical surgery    History of depression 2004    due to work accident    History of epididymitis 2011    Hx of cervical spine surgery 2019    in [place], [address] [facility], Dr Codey Strong, cervical stenosis reduced to mild    Hx of neck injury 2001    pain management Dr Sebastian Jarquin of hand     S/P colonoscopy with polypectomy 2010, 2015    Dr Nury Whalen, Dr Castanon Fearing [facility]    Smoking     Solitary pulmonary nodule on lung CT 2020    right, Dr Faustino Arciniega   ,   Past Surgical History:   Procedure Laterality Date    COLONOSCOPY  2/17/2015    FLOR TIRADO, 2011    [facility], Dr Makayla [facility])   [facility]      March 2018, May 2019, [facility]    ,   Social History     Tobacco Use    Smoking status: Current Some Day Smoker     Packs/day: 1.50     Years: 30.00     Pack years: 45.00     Types: Cigarettes    Smokeless tobacco: Never Used    Tobacco comment: working on cutting back, will try the electronic cigarette   Substance Use Topics    Alcohol use: No    Drug use: No   ,   Family History   Problem Relation Age of Onset    Heart Attack Mother         dec 79    Cancer Father         lung, dec 80   ,   Immunization History   Administered Date(s) Administered    Tdap (Boostrix, Adacel) 08/29/2017   ,   Health Maintenance   Topic Date Due    Shingles Vaccine (1 of 2) 12/18/2001    Pneumococcal 65+ years Vaccine (1 of 1 - PPSV23) 12/18/2016    Annual Wellness Visit (AWV)  05/29/2019  Colon cancer screen colonoscopy  02/17/2020    Flu vaccine (1) 09/01/2020    Low dose CT lung screening  07/24/2021    Lipid screen  01/20/2025    DTaP/Tdap/Td vaccine (2 - Td) 08/29/2027    AAA screen  Completed    Hepatitis C screen  Completed    Hepatitis A vaccine  Aged Out    Hepatitis B vaccine  Aged Out    Hib vaccine  Aged Out    Meningococcal (ACWY) vaccine  Aged Out           PHYSICAL EXAMINATION:  [ INSTRUCTIONS:  \"[x]\" Indicates a positive item  \"[]\" Indicates a negative item  -- DELETE ALL ITEMS NOT EXAMINED]  [x] Alert  [] Oriented to person/place/time    [] No apparent distress  [] Toxic appearing    [] Face flushed appearing [] Sclera clear  [] Lips are cyanotic      [] Breathing appears normal  [] Appears tachypneic      [] No rash on visible skin    [] Cranial Nerves II-XII grossly intact    [] Motor grossly intact in visible upper extremities    [] Motor grossly intact in visible lower extremities    [] Normal Mood  [] Anxious appearing    [] Depressed appearing  [] Confused appearing      [] Poor short term memory  [] Poor long term memory    [] OTHER:      Due to this being a TeleHealth encounter, evaluation of the following organ systems is limited: Vitals/Constitutional/EENT/Resp/CV/GI//MS/Neuro/Skin/Heme-Lymph-Imm. Imaging studies CT chest reviewed with the patient small bilateral pulmonary nodules and mild centrilobular emphysema  Labs reviewed   PFT none      ASSESSMENT/PLAN:  1. Lung nodules  · Continue monitoring, will repeat CT chest next year  - CT CHEST WO CONTRAST; Future    2. SOB (shortness of breath)  Probable COPD, will obtain PFT and evaluate on follow-up  - Full PFT Study With Bronchodilator; Future  - COVID-19 Ambulatory; Future    3. Smoking  Smoking cessation counseling done again    4. Asbestos exposure  Monitor CT as above      Return in about 12 weeks (around 10/22/2020).   Total time 22 minutes  An  electronic signature was used to authenticate this

## 2020-08-03 ENCOUNTER — TELEPHONE (OUTPATIENT)
Dept: GASTROENTEROLOGY | Age: 70
End: 2020-08-03

## 2020-08-04 ENCOUNTER — NURSE ONLY (OUTPATIENT)
Dept: PRIMARY CARE CLINIC | Age: 70
End: 2020-08-04

## 2020-08-04 ENCOUNTER — HOSPITAL ENCOUNTER (OUTPATIENT)
Age: 70
Setting detail: SPECIMEN
Discharge: HOME | End: 2020-08-04
Payer: MEDICARE

## 2020-08-04 PROCEDURE — U0003 INFECTIOUS AGENT DETECTION BY NUCLEIC ACID (DNA OR RNA); SEVERE ACUTE RESPIRATORY SYNDROME CORONAVIRUS 2 (SARS-COV-2) (CORONAVIRUS DISEASE [COVID-19]), AMPLIFIED PROBE TECHNIQUE, MAKING USE OF HIGH THROUGHPUT TECHNOLOGIES AS DESCRIBED BY CMS-2020-01-R: HCPCS

## 2020-08-06 LAB
SARS-COV-2: NOT DETECTED
SOURCE: NORMAL

## 2020-08-11 ENCOUNTER — APPOINTMENT (OUTPATIENT)
Dept: GENERAL RADIOLOGY | Age: 70
End: 2020-08-11
Payer: OTHER MISCELLANEOUS

## 2020-08-11 ENCOUNTER — APPOINTMENT (OUTPATIENT)
Dept: CT IMAGING | Age: 70
End: 2020-08-11
Payer: OTHER MISCELLANEOUS

## 2020-08-11 ENCOUNTER — HOSPITAL ENCOUNTER (EMERGENCY)
Age: 70
Discharge: HOME | End: 2020-08-11
Attending: EMERGENCY MEDICINE
Payer: OTHER MISCELLANEOUS

## 2020-08-11 VITALS
TEMPERATURE: 98.1 F | RESPIRATION RATE: 18 BRPM | WEIGHT: 160 LBS | DIASTOLIC BLOOD PRESSURE: 65 MMHG | HEART RATE: 75 BPM | HEIGHT: 66 IN | SYSTOLIC BLOOD PRESSURE: 110 MMHG | BODY MASS INDEX: 25.71 KG/M2 | OXYGEN SATURATION: 98 %

## 2020-08-11 PROCEDURE — 70450 CT HEAD/BRAIN W/O DYE: CPT

## 2020-08-11 PROCEDURE — 73130 X-RAY EXAM OF HAND: CPT

## 2020-08-11 PROCEDURE — 73562 X-RAY EXAM OF KNEE 3: CPT

## 2020-08-11 PROCEDURE — 72125 CT NECK SPINE W/O DYE: CPT

## 2020-08-11 PROCEDURE — 99284 EMERGENCY DEPT VISIT MOD MDM: CPT

## 2020-08-11 PROCEDURE — 99283 EMERGENCY DEPT VISIT LOW MDM: CPT | Performed by: PHYSICIAN ASSISTANT

## 2020-08-11 PROCEDURE — 6830039000 HC L3 TRAUMA ALERT

## 2020-08-11 RX ORDER — CYCLOBENZAPRINE HCL 10 MG
10 TABLET ORAL 3 TIMES DAILY PRN
Qty: 30 TABLET | Refills: 0 | Status: SHIPPED | OUTPATIENT
Start: 2020-08-11 | End: 2020-08-12 | Stop reason: CLARIF

## 2020-08-11 RX ORDER — IBUPROFEN 800 MG/1
800 TABLET ORAL EVERY 8 HOURS PRN
Qty: 30 TABLET | Refills: 0 | Status: SHIPPED | OUTPATIENT
Start: 2020-08-11 | End: 2020-08-12 | Stop reason: CLARIF

## 2020-08-11 ASSESSMENT — PAIN SCALES - GENERAL: PAINLEVEL_OUTOF10: 0
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
## 2020-08-12 ENCOUNTER — VIRTUAL VISIT (OUTPATIENT)
Dept: FAMILY MEDICINE CLINIC | Age: 70
End: 2020-08-12
Payer: MEDICARE

## 2020-08-12 PROCEDURE — 99214 OFFICE O/P EST MOD 30 MIN: CPT | Performed by: INTERNAL MEDICINE

## 2020-08-12 ASSESSMENT — ENCOUNTER SYMPTOMS
BACK PAIN: 0
EYE PAIN: 0
ABDOMINAL PAIN: 0
SHORTNESS OF BREATH: 0

## 2020-08-12 NOTE — PATIENT INSTRUCTIONS
Patient Education        Arthritis: Care Instructions  Overview  Arthritis, also called osteoarthritis, is a breakdown of the cartilage that cushions your joints. When the cartilage wears down, your bones rub against each other. This causes pain and stiffness. Many people have some arthritis as they age. Arthritis most often affects the joints of the spine, hands, hips, knees, or feet. Arthritis never goes away completely. But medicine and home treatment can help reduce pain and help you stay active. Follow-up care is a key part of your treatment and safety. Be sure to make and go to all appointments, and call your doctor if you are having problems. It's also a good idea to know your test results and keep a list of the medicines you take. How can you care for yourself at home? · Stay at a healthy weight. Being overweight puts extra strain on your joints. · Talk to your doctor or physical therapist about exercises that will help ease joint pain. ? Stretch. You may enjoy gentle forms of yoga to help keep your joints and muscles flexible. ? Walk instead of jog. Other types of exercise that are less stressful on the joints include riding a bike, swimming, edilberto chi, or water exercise. ? Lift weights. Strong muscles help reduce stress on your joints. Stronger thigh muscles, for example, take some of the stress off of the knees and hips. Learn the right way to lift weights so you don't make joint pain worse. · Take your medicines exactly as prescribed. Call your doctor if you think you are having a problem with your medicine. · Take pain medicines exactly as directed. ? If the doctor gave you a prescription medicine for pain, take it as prescribed. ? If you are not taking a prescription pain medicine, ask your doctor if you can take an over-the-counter medicine. · Use a cane, crutch, walker, or another device if you need help to get around. These can help rest your joints.  You also can use other things to make life easier, such as a higher toilet seat and padded handles on kitchen utensils. · Do not sit in low chairs. They can make it hard to get up. · Put heat or cold on your sore joints as needed. Use whichever helps you most. You can also switch between hot and cold packs. ? Apply heat 2 or 3 times a day for 20 to 30 minutes--using a heating pad, hot shower, or hot pack--to relieve pain and stiffness. But don't use heat on a swollen joint. ? Put ice or a cold pack on your sore joint for 10 to 20 minutes at a time. Put a thin cloth between the ice and your skin. When should you call for help? Call your doctor now or seek immediate medical care if:  · You have sudden swelling, warmth, or pain in any joint. · You have joint pain and a fever or rash. · You have such bad pain that you cannot use a joint. Watch closely for changes in your health, and be sure to contact your doctor if:  · You have mild joint symptoms that continue even with more than 6 weeks of care at home. · You have stomach pain or other problems with your medicine. Where can you learn more? Go to https://Relevvant.TrademarkFly. org and sign in to your [employer] account. Enter D533 in the [facility] box to learn more about \"Arthritis: Care Instructions. \"     If you do not have an account, please click on the \"Sign Up Now\" link. Current as of: December 9, 2019               Content Version: 12.5  © 2706-7074 Healthwise, Incorporated. Care instructions adapted under license by [facility] ([facility]). If you have questions about a medical condition or this instruction, always ask your healthcare professional. [place] [address] any warranty or liability for your use of this information. Patient Education        Osteoarthritis: Care Instructions  Your Care Instructions     Arthritis is a common health problem in which the joints are inflamed. There are several kinds of arthritis.  Osteoarthritis is caused by a breakdown of cartilage, the hard, thick tissue that cushions the joints. It causes pain, stiffness, and swelling, often in the spine, fingers, hips, and knees. Osteoarthritis can happen at any age, but it is most common in older people. Osteoarthritis never goes away completely, but it can be controlled. Medicine and home treatment can reduce the pain and prevent the arthritis from getting worse. Follow-up care is a key part of your treatment and safety. Be sure to make and go to all appointments, and call your doctor if you are having problems. It's also a good idea to know your test results and keep a list of the medicines you take. How can you care for yourself at home? · Take a warm shower or bath in the morning to relieve stiffness. Avoid sitting still afterwards. · If the joint is not swollen, use moist heat, like a warm, damp towel, for 20 to 30 minutes, 2 or 3 times a day. Do not use heat on a swollen joint. · If the joint is swollen, use ice or cold packs for 10 to 20 minutes, once an hour. Cold will help relieve pain and reduce inflammation. Put a thin cloth between the ice and your skin. · To prevent stiffness, gently move the joint through its full range of motion several times a day. · If the joint hurts, avoid activities that put a strain on it for a few days. Take rest breaks throughout the day. · Get regular exercise. Walking, swimming, yoga, biking, edilberto chi, and water aerobics are good exercises that are gentle on the joints. · Reach and stay at a healthy weight. If you need to lose or maintain weight, regular exercise and a healthy diet will help. Extra weight can strain the joints, especially the knees and hips, and make the pain worse. Losing even a few pounds may help. · Take pain medicines exactly as directed. ? If the doctor gave you a prescription medicine for pain, take it as prescribed.   ? If you are not taking a prescription pain medicine, ask your doctor if you can take an over-the-counter medicine. When should you call for help? Call your doctor now or seek immediate medical care if:  · The pain is so bad that you cannot use the joint. · You have sudden back pain with weakness in your legs or loss of bowel or bladder control. · Your stools are black and tarlike or have streaks of blood. · You have severe pain and swelling in more than one joint. Watch closely for changes in your health, and be sure to contact your doctor if:  · You have side effects from the medicines, like belly pain, ongoing heartburn, or nausea. · Joint pain continues for more than 6 weeks, and home treatment is not helping. Where can you learn more? Go to https://TAPTAP Networks.Chartio. org and sign in to your [employer]ell[employer]ics account. Enter E246 in the [employer] box to learn more about \"Osteoarthritis: Care Instructions. \"     If you do not have an account, please click on the \"Sign Up Now\" link. Current as of: December 9, 2019               Content Version: 12.5  © 8616-7739 Healthwise, Incorporated. Care instructions adapted under license by [facility] ([facility]). If you have questions about a medical condition or this instruction, always ask your healthcare professional. [place] [address] any warranty or liability for your use of this information.
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
## 2020-08-17 ENCOUNTER — HOSPITAL ENCOUNTER (OUTPATIENT)
Dept: ULTRASOUND IMAGING | Age: 70
Discharge: HOME | End: 2020-08-19
Payer: MEDICARE

## 2020-08-17 PROCEDURE — 76536 US EXAM OF HEAD AND NECK: CPT

## 2020-08-27 ENCOUNTER — VIRTUAL VISIT (OUTPATIENT)
Dept: FAMILY MEDICINE CLINIC | Age: 70
End: 2020-08-27
Payer: MEDICARE

## 2020-08-27 ENCOUNTER — TELEPHONE (OUTPATIENT)
Dept: FAMILY MEDICINE CLINIC | Age: 70
End: 2020-08-27

## 2020-08-27 PROCEDURE — 99442 PR PHYS/QHP TELEPHONE EVALUATION 11-20 MIN: CPT | Performed by: INTERNAL MEDICINE

## 2020-08-27 ASSESSMENT — ENCOUNTER SYMPTOMS
EYE PAIN: 0
SHORTNESS OF BREATH: 0
ABDOMINAL PAIN: 0
BACK PAIN: 0

## 2020-08-27 NOTE — TELEPHONE ENCOUNTER
Please watch this makes a duplicate message which can cause confusion. Dr Laura Ortega already sent a message and was sent to [address] [place] [address] who handles this.thanks.

## 2020-08-27 NOTE — TELEPHONE ENCOUNTER
Called patient for follow up process after his VV today w/Dr. Ricarda Diallo. He stated that Dr. Ricarda Diallo was working on getting him the name of another doctor to look at his hand, as he was not happy with the first specialist to which he was referred. He is wondering the status of that request and the name of that specialist.      Patient may be reached at [phone].
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
## 2020-08-28 NOTE — PATIENT INSTRUCTIONS
Return to work    1/20/2020      RE:    Rudolph Jean   [address]  [place] [facility] [address]      This is to certify that Rudolph was seen in the clinic today and can return to work 1/27/20.    Restrictions: none        Signature: __________________________________________________, 1/20/2020              Lolita Evans [facility]  [address] [place] [id number] [phone]    
make life easier, such as a higher toilet seat and padded handles on kitchen utensils. · Do not sit in low chairs. They can make it hard to get up. · Put heat or cold on your sore joints as needed. Use whichever helps you most. You can also switch between hot and cold packs. ? Apply heat 2 or 3 times a day for 20 to 30 minutes--using a heating pad, hot shower, or hot pack--to relieve pain and stiffness. But don't use heat on a swollen joint. ? Put ice or a cold pack on your sore joint for 10 to 20 minutes at a time. Put a thin cloth between the ice and your skin. When should you call for help? Call your doctor now or seek immediate medical care if:  · You have sudden swelling, warmth, or pain in any joint. · You have joint pain and a fever or rash. · You have such bad pain that you cannot use a joint. Watch closely for changes in your health, and be sure to contact your doctor if:  · You have mild joint symptoms that continue even with more than 6 weeks of care at home. · You have stomach pain or other problems with your medicine. Where can you learn more? Go to [email or web address] and sign in to your XDx account. Enter P467 in the [facility] box to learn more about \"Arthritis: Care Instructions. \"     If you do not have an account, please click on the \"Sign Up Now\" link. Current as of: December 9, 2019               Content Version: 12.5  © 1897-7336 Healthwise, Incorporated. Care instructions adapted under license by [facility] ([facility]). If you have questions about a medical condition or this instruction, always ask your healthcare professional. [place] [address] any warranty or liability for your use of this information.

## 2020-10-14 ENCOUNTER — TELEPHONE (OUTPATIENT)
Dept: PULMONOLOGY | Age: 70
End: 2020-10-14

## 2020-10-14 NOTE — TELEPHONE ENCOUNTER
Spoke with patient re: PFT. He said he is afraid to go out due to the [place].  He wants to wait to have it done

## 2020-10-15 ENCOUNTER — OFFICE VISIT (OUTPATIENT)
Dept: PULMONOLOGY | Age: 70
End: 2020-10-15
Payer: MEDICARE

## 2020-10-15 VITALS
RESPIRATION RATE: 16 BRPM | WEIGHT: 145.6 LBS | SYSTOLIC BLOOD PRESSURE: 100 MMHG | HEART RATE: 88 BPM | TEMPERATURE: 97.5 F | BODY MASS INDEX: 23.4 KG/M2 | HEIGHT: 66 IN | OXYGEN SATURATION: 99 % | DIASTOLIC BLOOD PRESSURE: 68 MMHG

## 2020-10-15 PROCEDURE — G0296 VISIT TO DETERM LDCT ELIG: HCPCS | Performed by: INTERNAL MEDICINE

## 2020-10-15 PROCEDURE — 99213 OFFICE O/P EST LOW 20 MIN: CPT | Performed by: INTERNAL MEDICINE

## 2020-10-15 RX ORDER — OMEGA-3S/DHA/EPA/FISH OIL/D3 300MG-1000
400 CAPSULE ORAL DAILY
COMMUNITY

## 2020-10-15 RX ORDER — ZINC GLUCONATE 50 MG
50 TABLET ORAL DAILY
COMMUNITY
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
## 2020-11-23 ENCOUNTER — TELEPHONE (OUTPATIENT)
Dept: ENDOSCOPY | Age: 70
End: 2020-11-23

## 2020-11-23 NOTE — TELEPHONE ENCOUNTER
called in prescription for Trilyte bowel prep kit to [facility] in [place] 11/23/2020 at 9/15am

## 2020-11-30 ENCOUNTER — NURSE ONLY (OUTPATIENT)
Dept: PRIMARY CARE CLINIC | Age: 70
End: 2020-11-30

## 2020-11-30 ENCOUNTER — TELEPHONE (OUTPATIENT)
Dept: ADMINISTRATIVE | Age: 70
End: 2020-11-30

## 2020-11-30 NOTE — TELEPHONE ENCOUNTER
If fevers, chills, worsening symptoms needs mri asap  If not he can please call his pa doctor and see if he is okay with him getting mri here at [facility] or if he wants to have it done himself    Call immediately should something change.
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
## 2020-12-01 LAB
SARS-COV-2: NOT DETECTED
SOURCE: NORMAL

## 2020-12-08 NOTE — TELEPHONE ENCOUNTER
Noted  Keep close follow up  Thanks,    Call immediately should something change.      Gypsy Rosario

## 2020-12-08 NOTE — TELEPHONE ENCOUNTER
Pt states he has just gotten the MRI straightened out so he is waiting on them to schedule him. No symptoms of covid.

## 2020-12-22 ENCOUNTER — HOSPITAL ENCOUNTER (OUTPATIENT)
Dept: MRI IMAGING | Age: 70
Discharge: HOME | End: 2020-12-24
Payer: MEDICARE

## 2020-12-22 PROCEDURE — 72141 MRI NECK SPINE W/O DYE: CPT

## 2021-02-01 ENCOUNTER — TELEPHONE (OUTPATIENT)
Dept: FAMILY MEDICINE CLINIC | Age: 71
End: 2021-02-01

## 2021-02-01 DIAGNOSIS — M79.642 PAIN IN BOTH HANDS: ICD-10-CM

## 2021-02-01 DIAGNOSIS — R93.7 ABNORMAL MAGNETIC RESONANCE IMAGING OF THORACIC SPINE: Primary | ICD-10-CM

## 2021-02-01 DIAGNOSIS — M79.641 PAIN IN BOTH HANDS: ICD-10-CM

## 2021-02-01 NOTE — TELEPHONE ENCOUNTER
Patient called to inform you he had a vv with a spinal surgeon from [facility] and would like to discuss potential treatment plans/ referral with you. He is scheduled for a vv on 02/05 but would like a call sooner if possible.

## 2021-02-02 NOTE — TELEPHONE ENCOUNTER
Spoke to him yesterday around 7 pm   Documented now  Needs emg and pt  Will order  Let him know  Keep appt

## 2021-02-05 ENCOUNTER — VIRTUAL VISIT (OUTPATIENT)
Dept: FAMILY MEDICINE CLINIC | Age: 71
End: 2021-02-05
Payer: MEDICARE

## 2021-02-05 DIAGNOSIS — J43.2 CENTRILOBULAR EMPHYSEMA (HCC): ICD-10-CM

## 2021-02-05 DIAGNOSIS — Z00.00 ANNUAL PHYSICAL EXAM: Primary | ICD-10-CM

## 2021-02-05 DIAGNOSIS — E78.5 HYPERLIPIDEMIA, UNSPECIFIED HYPERLIPIDEMIA TYPE: ICD-10-CM

## 2021-02-05 DIAGNOSIS — F51.01 PRIMARY INSOMNIA: ICD-10-CM

## 2021-02-05 DIAGNOSIS — Z12.5 SCREENING FOR PROSTATE CANCER: ICD-10-CM

## 2021-02-05 PROCEDURE — 99443 PR PHYS/QHP TELEPHONE EVALUATION 21-30 MIN: CPT | Performed by: INTERNAL MEDICINE

## 2021-02-05 ASSESSMENT — PATIENT HEALTH QUESTIONNAIRE - PHQ9
2. FEELING DOWN, DEPRESSED OR HOPELESS: 0
SUM OF ALL RESPONSES TO PHQ QUESTIONS 1-9: 0
SUM OF ALL RESPONSES TO PHQ9 QUESTIONS 1 & 2: 0

## 2021-02-05 ASSESSMENT — ENCOUNTER SYMPTOMS
ABDOMINAL PAIN: 0
EYE PAIN: 0
BACK PAIN: 0
SHORTNESS OF BREATH: 0
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
## 2021-02-06 ENCOUNTER — TELEPHONE (OUTPATIENT)
Dept: FAMILY MEDICINE CLINIC | Age: 71
End: 2021-02-06

## 2021-02-06 NOTE — TELEPHONE ENCOUNTER
HE WANTS number to back area  Please give him this  Thanks  Once this is completed you can close message out.

## 2021-02-17 ENCOUNTER — HOSPITAL ENCOUNTER (OUTPATIENT)
Dept: NEUROLOGY | Age: 71
Discharge: HOME | End: 2021-02-17
Payer: MEDICARE

## 2021-02-17 DIAGNOSIS — M79.641 PAIN IN BOTH HANDS: ICD-10-CM

## 2021-02-17 DIAGNOSIS — R93.7 ABNORMAL MAGNETIC RESONANCE IMAGING OF THORACIC SPINE: ICD-10-CM

## 2021-02-17 DIAGNOSIS — M79.642 PAIN IN BOTH HANDS: ICD-10-CM

## 2021-02-17 PROCEDURE — 95910 NRV CNDJ TEST 7-8 STUDIES: CPT

## 2021-02-17 PROCEDURE — 95886 MUSC TEST DONE W/N TEST COMP: CPT

## 2021-02-18 ENCOUNTER — TELEPHONE (OUTPATIENT)
Dept: FAMILY MEDICINE CLINIC | Age: 71
End: 2021-02-18

## 2021-02-18 NOTE — TELEPHONE ENCOUNTER
Dianna Conti wants you to send his EMG results to [place]. He has no med rec release in his chart for this and I tried to explain that we would need one to legally send that. He got mad and told me I had no idea what I was talking about, he hung up on me.

## 2021-02-18 NOTE — TELEPHONE ENCOUNTER
Spoke to patient, he was very upset that we could not fax his report. He then gave me permission and gave me the fax # to his Neurosurgeon in [place]. I faxed as requested.  Patient said to let you know he is doing well and he will follow up with you in the spring

## 2021-02-25 ENCOUNTER — TELEPHONE (OUTPATIENT)
Dept: FAMILY MEDICINE CLINIC | Age: 71
End: 2021-02-25

## 2021-02-25 NOTE — TELEPHONE ENCOUNTER
Patient called requesting a referral to a neurosurgeon in the area for his carpal tunnel, he is asking for the best one in your opinion.

## 2021-02-25 NOTE — TELEPHONE ENCOUNTER
We usually have ortho hand here do it  I send most of my patient to  dr Leora Xiong,  If he agreeable to see him let me know

## 2021-03-05 ENCOUNTER — TELEPHONE (OUTPATIENT)
Dept: FAMILY MEDICINE CLINIC | Age: 71
End: 2021-03-05

## 2021-03-05 ENCOUNTER — VIRTUAL VISIT (OUTPATIENT)
Dept: FAMILY MEDICINE CLINIC | Age: 71
End: 2021-03-05
Payer: MEDICARE

## 2021-03-05 DIAGNOSIS — G56.01 CARPAL TUNNEL SYNDROME OF RIGHT WRIST: Primary | ICD-10-CM

## 2021-03-05 PROCEDURE — 99442 PR PHYS/QHP TELEPHONE EVALUATION 11-20 MIN: CPT | Performed by: INTERNAL MEDICINE

## 2021-03-05 SDOH — ECONOMIC STABILITY: INCOME INSECURITY: HOW HARD IS IT FOR YOU TO PAY FOR THE VERY BASICS LIKE FOOD, HOUSING, MEDICAL CARE, AND HEATING?: NOT HARD AT ALL

## 2021-03-05 SDOH — ECONOMIC STABILITY: TRANSPORTATION INSECURITY
IN THE PAST 12 MONTHS, HAS LACK OF TRANSPORTATION KEPT YOU FROM MEETINGS, WORK, OR FROM GETTING THINGS NEEDED FOR DAILY LIVING?: NO

## 2021-03-05 SDOH — ECONOMIC STABILITY: FOOD INSECURITY: WITHIN THE PAST 12 MONTHS, YOU WORRIED THAT YOUR FOOD WOULD RUN OUT BEFORE YOU GOT MONEY TO BUY MORE.: NEVER TRUE

## 2021-03-05 SDOH — ECONOMIC STABILITY: FOOD INSECURITY: WITHIN THE PAST 12 MONTHS, THE FOOD YOU BOUGHT JUST DIDN'T LAST AND YOU DIDN'T HAVE MONEY TO GET MORE.: NEVER TRUE

## 2021-03-05 ASSESSMENT — ENCOUNTER SYMPTOMS
EYE PAIN: 0
ABDOMINAL PAIN: 0
BACK PAIN: 0
SHORTNESS OF BREATH: 0

## 2021-03-05 NOTE — PROGRESS NOTES
Subjective:      Patient ID: Levi Mantilla is a 72 y.o. male who presents today with:  No chief complaint on file. HPI    Here for follow up  cts  Right worse than left  Doesn't want traditional surgery  Mentions he might consider orthoscopy surgery  Doing carpal tunnel solutions right now and doing much better   He will do this for up 6 to 8 weeks  If not better  He will consider orthoscopy surgery if he is not better  He mentions he hasn't completed his blood work yet. Otherwise doing well. Mentions he waited to get this done, his fault  But he is willing to do it this way and keep follow up. Off the insomnia meds.    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     right index    Foot drop, left foot     H/O cervical spine surgery     minimal invasive surgery, [place]    H/O Kailash's syndrome 2018    post cervical surgery    History of depression 2004    due to work accident    History of epididymitis 2011    Hx of cervical spine surgery 2019    in [place], [facility], Dr Ellis Schwab, cervical stenosis reduced to mild    Hx of neck injury 2001    pain management Dr Darlin Donaldson of hand     S/P colonoscopy with polypectomy 2010, 2015    Dr Pato Boothe, Dr Cheri Moritz [facility] spinal surgery 04/2011    [facility]    Smoking     Solitary pulmonary nodule on lung CT 2020    right, Dr Louis Martinez     Past Surgical History:   Procedure Laterality Date    BACK SURGERY      COLONOSCOPY  2/17/2015    BIBI SEBASTIAN M.D.   Tato Edwards, 2011    [facility], Dr Jon Naranjo [facility])   [address] 54      March 2018, May 2019, [facility]      Social History     Socioeconomic History    Marital status:      Spouse name: Not on file    Number of children: 1    Years of education: Not on file    Highest education level: Not on file   Occupational History    Occupation: retired [employer] Financial resource strain: Not hard at all   [place] insecurity     Worry: Never true     Inability: Never true    Transportation needs     Medical: No     Non-medical: No   Tobacco Use    Smoking status: Current Some Day Smoker     Packs/day: 1.50     Years: 30.00     Pack years: 45.00     Types: Cigarettes    Smokeless tobacco: Never Used    Tobacco comment: working on cutting back, will try the electronic cigarette   Substance and Sexual Activity    Alcohol use: No    Drug use: No    Sexual activity: Not on file   Lifestyle    Physical activity     Days per week: Not on file     Minutes per session: Not on file    Stress: Not on file   Relationships    Social connections     Talks on phone: Not on file     Gets together: Not on file     Attends Restorationist service: Not on file     Active member of club or organization: Not on file     Attends meetings of clubs or organizations: Not on file     Relationship status: Not on file    Intimate partner violence     Fear of current or ex partner: Not on file     Emotionally abused: Not on file     Physically abused: Not on file     Forced sexual activity: Not on file   Other Topics Concern    Not on file   Social History Narrative    Born in [facility], 1 brother and 3 sisters, keeps in touch with brother     Harris Shepard came from [place]     , worked at [employer] x 43 years, /, got injured in 2004        Lives at home with spouse, one son, keeps in touch    (Was on [employer] comp till 2012)    [address] housework, 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 I have personally reviewed the ROS, PMH, PFH, and social history     Review of Systems   Constitutional: Negative for chills. HENT: Negative for congestion. Eyes: Negative for pain. Respiratory: Negative for shortness of breath. Cardiovascular: Negative for chest pain. Gastrointestinal: Negative for abdominal pain. Genitourinary: Negative for hematuria. Musculoskeletal: Negative for back pain. Allergic/Immunologic: Negative for immunocompromised state. Neurological: Negative for headaches. Psychiatric/Behavioral: Negative for hallucinations. Objective: There were no vitals taken for this visit. Physical Exam    Unable to perform given telephone visit. Assessment:       Diagnosis Orders   1. Carpal tunnel syndrome of right wrist           Plan:    Video visit done because of coronavirus pandemic. Time spent 14 MINUTES  explained billeable visit, patient understands and agrees to continue  I was at home and patient was at home during this visit. PT sent, wants orthoscopic cts surgery. Keep follow up with pulmonary. Thyroid US due 08/2021  If no clear cause consider CT chest and or CTS referral. (since he's improving unlikely needed)   Never did Holter, he refused. (from before)   Carotid us clear. (02/2020)   Wants megan at next follow up. (needs done)   cac test not done, he can schedule. He wants to wait. No orders of the defined types were placed in this encounter. No orders of the defined types were placed in this encounter. Follow up with NS through [facility]   Going for EMG (Completed)  Seeing pulmonary, ct ordered. He follows up with [facility] neurosurgeons prn  Planning on seeing GI. (hasn't done yet)   If anything should change or worsen call ASAP, don't wait for next scheduled appointment.     Return in 3 months (on 6/5/2021) for Chronic

## 2021-03-06 ENCOUNTER — TELEPHONE (OUTPATIENT)
Dept: FAMILY MEDICINE CLINIC | Age: 71
End: 2021-03-06

## 2021-04-22 ENCOUNTER — TELEPHONE (OUTPATIENT)
Dept: FAMILY MEDICINE CLINIC | Age: 71
End: 2021-04-22

## 2021-04-22 NOTE — TELEPHONE ENCOUNTER
I did breifly  Hopefully it will work for him  , Tip Fernandez, [facility] and see if they provide any of these sevices?

## 2021-04-22 NOTE — TELEPHONE ENCOUNTER
Patient called because there are two therapies recommended by carpal tunnel solutions. 1. Dry needling therapy w/ electro ifc stimulation  2. Electro stimulation needle therapy (muscle therapy)    He wants to know if you have looked in to carpal tunnel solutions ([employer] [employer]). He is also asking if [place] provides any of this therapy or if you know of any place local that offers this therapy. Please advise, thank you.

## 2021-04-23 NOTE — TELEPHONE ENCOUNTER
Spoke with [facility]. They do offer those therapies. They requested that if and when you place the referral put those needs in comments for scheduling so they will see them on referral please.

## 2021-05-18 ENCOUNTER — HOSPITAL ENCOUNTER (OUTPATIENT)
Dept: PHYSICAL THERAPY | Age: 71
Setting detail: THERAPIES SERIES
Discharge: HOME | End: 2021-05-18
Payer: MEDICARE

## 2021-05-18 PROCEDURE — 97110 THERAPEUTIC EXERCISES: CPT

## 2021-05-18 PROCEDURE — 97162 PT EVAL MOD COMPLEX 30 MIN: CPT

## 2021-05-18 ASSESSMENT — PAIN DESCRIPTION - ORIENTATION: ORIENTATION: RIGHT

## 2021-05-18 ASSESSMENT — PAIN DESCRIPTION - LOCATION: LOCATION: WRIST;SHOULDER;ELBOW

## 2021-05-18 ASSESSMENT — PAIN DESCRIPTION - DESCRIPTORS: DESCRIPTORS: PINS AND NEEDLES;SHARP

## 2021-05-18 ASSESSMENT — PAIN DESCRIPTION - ONSET: ONSET: AWAKENED FROM SLEEP

## 2021-05-18 ASSESSMENT — PAIN - FUNCTIONAL ASSESSMENT: PAIN_FUNCTIONAL_ASSESSMENT: PREVENTS OR INTERFERES WITH ALL ACTIVE AND SOME PASSIVE ACTIVITIES

## 2021-05-18 NOTE — PROGRESS NOTES
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 several spine surgeries noted. 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 Past Surgical History:   has a past surgical history that includes Spine surgery (, , ); Colonoscopy (2015); Spine surgery; and back surgery. Vital Signs  Patient Currently in Pain: Yes   Pain Screening  Patient Currently in Pain: Yes  Pain Assessment  Pain Assessment: 0-10  Pain Level: 5 (Increases with activity)  Pain Location: Wrist;Shoulder;Elbow  Pain Orientation: Right  Pain Radiating Towards: Pt with shoulder and elbow discomfort in addition to wrist with arthritic changes. N/T intermittent wrist and middle palmar hand. Pt with hx of cervical radicular symptoms as well. Pain Descriptors: Pins and needles; Sharp  Pain Frequency: Intermittent  Pain Onset: Awakened from sleep  Functional Pain Assessment: Prevents or interferes with all active and some passive activities (Pt reports 25% current functional tolerance)         Lives With: Spouse  Home Layout: One level  Bathroom Shower/Tub: Tub/Shower unit  ADL Assistance: Independent  Homemaking Assistance: Independent  Homemaking Responsibilities: Yes  Ambulation Assistance: Independent  Transfer Assistance: Independent  Active : Yes  Mode of Transportation: Car  Occupation: Retired  Leisure & Hobbies: car shows, driving        Subjective:  Subjective: Pt reports long history of UE dysfunction. EMG suggestive of carpal tunnel however pt would like to trial more conservative options with dry needle and nerve stimulation to assist with wrist function and improve deficits. Objective:         Strength RUE  Strength RUE: WFL  R Shoulder Flexion: 4/5  R Shoulder Extension: 5/5  R Shoulder ABduction: 3+/5  R Shoulder Internal Rotation: 3+/5, 4-/5  R Shoulder External Rotation: 3+/5, 4-/5  R Elbow Flexion: 4-/5  R Elbow Extension: 4-/5  R Forearm Pron: 4-/5  R Forearm Sup: 4/5  R Wrist Flexion: 4+/5  R Wrist Extension: 4/5  R Wrist Radial Deviation: 4+/5  R Wrist Ulnar Deviation: 4+/5  Strength LUE  Strength LUE: WFL  Comment: Shoulder 4-/5, elbow and writst 4+/5  Strength Other  Other: Able to make a fist however arthritic changes limiting motion. , Mid/low scap R 3+/5 R, 4/5 L        AROM RUE (degrees)  RUE AROM : WFL  R Shoulder Flexion 0-180: 105  R Shoulder Extension 0-45: 40  R Shoulder ABduction 0-180: 75  R Shoulder Int Rotation  0-70: reachL4  R Shoulder Ext Rotation 0-90: WFL  R Forearm Pron 0-90: 70  R Forearm Supination  0-90: 48  R Wrist Flexion 0-80: 40  R Wrist Extension 0-70: 50  R Wrist Radial Deviation 0-20: 10  R Wrist Ulnar Deviation 0-45: 20  AROM LUE (degrees)  LUE AROM : WNL  L Shoulder Flexion 0-180: 135     Observation/Palpation  Posture: Fair  Palpation: min tenderness palm of hand  Observation: fwd  head, rounded shoulders     Additional Measures  Special Tests: Phalens(+), Reverse phalens(unable to obtain position), Tinels(min tingling reports), Varus(-), Valgus(-)     Exercises:   Exercises  Exercise 1: pulleys*  Exercise 2: rubberband finger ext*  Exercise 3: wrist flex/ext stretch*  Exercise 4: scap retraction*  Exercise 5: serratus punch*  Exercise 6: wrist ext stretch with palm on table 10s x 10  Exercise 7: palmar surface (massage with ball) educated in positioning for home. Exercise 8: posture education 5 min, alignment with cervical, shoulder, hip and back,  Exercise 20: HEP:wrist ext self stretch, palmar hand massage with ball (rolling hand over ball), posture awareness  Modalities:  Modalities  Ultrasound: *R wrist  E-stim (parameters): ENS with IDN*  Manual:  Manual therapy  Joint mobilization: gr II-IV finger and wrist glides*  PROM: finger and wrist/forearm and shoulder*  Other: IDN*  *Indicates exercise,modality, or manual techniques to be initiated when appropriate  Assessment: Body structures, Functions, Activity limitations: Increased pain, Decreased ADL status, Decreased posture, Decreased ROM, Decreased strength  Assessment: The pt's impairments currently limit functional abilities by 61% including his abilities to  reach and lift, perform recreational actvities, and perform household/work related duties without pain or limitations. Skilled PT required to address above deficits to improve overall function and return to improved level of function.   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 several spine surger  Exam: UEFI 31/80=39% functional  Clinical Presentation: evolving        Plan  Frequency/Duration:  Plan  Times per week: 2  Plan weeks: 4-6  Current Treatment Recommendations: Strengthening, ROM, Home Exercise Program, Integrated Dry Needling, Manual Therapy - Soft Tissue Mobilization, Modalities  Plan Comment: Transfer pt care Rosy Hernandez DPT     Patient Education  New Education Provided: PT Education: Tano Gonzalez of Care;Home Exercise Program  Patient Education: posture and written HEP provided. POST-PAIN     Pain Rating (0-10 pain scale): Increase tingling, no pain /10  Location and pain description same as pre-treatment unless indicated. Action: [x] NA  [] Call Physician  [] Perform HEP  [] Meds as prescribed    Evaluation and patient rights have been reviewed and patient agrees with plan of care. Yes  [x]  No  []   Explain:       [place]ete Fall Risk Assessment  Risk Factor Scale  Score   History of Falls [] Yes  [x] No 25  0 0   Secondary Diagnosis [] Yes  [x] No 15  0 0   Ambulatory Aid [] Furniture  [] Crutches/cane/walker  [x] None/bedrest/wheelchair/nurse 30  15  0 0   IV/Heparin Lock [] Yes  [x] No 20  0 0   Gait/Transferring [] Impaired  [] Weak  [x] Normal/bedrest/immobile 20  10  0 0   Mental Status [] Forgets limitations  [x] Oriented to own ability 15  0 0      Total:0     Based on the Assessment score: check the appropriate box. [x]  No intervention needed   Low =   Score of 0-24  []  Use standard prevention interventions Moderate =  Score of 24-44   [] Discuss fall prevention strategies   [] Indicate moderate falls risk on eval  []  Use high risk prevention interventions High = Score of 45 and higher   [] Discuss fall prevention strategies   [] Provide supervision during treatment time    Goals  Short term goals  Time Frame for Short term goals: 2 weeks  Short term goal 1: Initiate written HEP for symptom management. Short term goal 2: Pain-free R shoulder flex, ext, abd and forearm pronation/supination AROM to increase 10-20deg allowing an increase in ADL tolerance. Short term goal 3: Decrease R wrist pain </=3/10 to assist with improved functional gains during ADL's.   Long term goals  Time Frame for Long term goals : 4-6 weeks  Long term goal 1: Pain-free R wrist AROM to increase 10-20 deg allowing an increase in ADL tolerance. Long term goal 2: Improve R mid scap strength 4-/5 to allow patient to improve poture awareness.   Long term goal 3: Pt demo improved overall function by reporting greater than 60% per functional survey score  Long term goal 4: Improve R UE strength 4/5 to 4+/5 to allow patient to improve functional tolerance during higher level activity  0  PT Individual Minutes  Time In: 1105  Time Out: 1206  Minutes: 61  Timed Code Treatment Minutes: 10 Minutes  Procedure Minutes:Eval 50 min     Timed Activity Minutes Units   Neuro re ed 5 0   Ther Ex 5 1       Electronically signed by Yris Lyons PT on 5/18/21 at 4:02 PM EDT

## 2021-05-21 ENCOUNTER — HOSPITAL ENCOUNTER (OUTPATIENT)
Dept: PHYSICAL THERAPY | Age: 71
Setting detail: THERAPIES SERIES
Discharge: HOME | End: 2021-05-21
Payer: MEDICARE

## 2021-05-21 PROCEDURE — 97110 THERAPEUTIC EXERCISES: CPT

## 2021-05-21 PROCEDURE — 97112 NEUROMUSCULAR REEDUCATION: CPT

## 2021-05-21 ASSESSMENT — PAIN DESCRIPTION - PAIN TYPE: TYPE: CHRONIC PAIN

## 2021-05-21 ASSESSMENT — PAIN DESCRIPTION - DESCRIPTORS: DESCRIPTORS: ACHING;TINGLING;NUMBNESS

## 2021-05-21 NOTE — PROGRESS NOTES
[address] [facility]  Outpatient Physical Therapy    Treatment Note        Date: 2021  Patient: Colette Chauhan  : 1950  ACCT #: [de-identified]  Referring Practitioner: Dr Jam Lassiter  Diagnosis: Carpal tunnel syndrome of R wrist without surgical intervention    Visit Information:  PT Visit Information  Onset Date: 21 (initial problem 2018 after paralyzed from arthritis and surgical intervention helped gain UE use back.)  PT Insurance Information: A[employer]na Medicare  Total # of Visits Approved:  (BMN)  Total # of Visits to Date: 2  Plan of Care/Certification Expiration Date: 21  No Show: 0  Canceled Appointment: 0  Progress Note Counter: 2/8-10    Subjective: Pt reports having \"80% blockage\" in R hand from carpal tunnel. HEP Compliance:  [x] Good [] Fair [] Poor [] Reports not doing due to:    Vital Signs  Patient Currently in Pain: Yes   Pain Screening  Patient Currently in Pain: Yes  Pain Assessment  Pain Level: 5  Pain Type: Chronic pain  Pain Location: Hand  Pain Orientation: Right  Pain Descriptors: Aching;Tingling;Numbness    OBJECTIVE:   Exercises  Exercise 1: pulleys x 3 mins  Exercise 2: rubberband finger ext*  Exercise 3: wrist flex/ext stretch 30\"x3  Exercise 4: scap retraction*  Exercise 5: serratus punch*  Exercise 6: wrist ext stretch with palm on table*  Exercise 9: prayer stretch 30\"x3  Exercise 10: corner stretch*  Exercise 20: HEP: cont current    Strength: [x] NT  [] MMT completed:    ROM: [x] NT  [] ROM measurements:     Manual:   Manual therapy  Other: IDN to further improve NM mechanics, tissue mobility, and decrease pain (please refer to homeostatic paper for specific points needled to be scanned into chart at D/C) x3 mins    *Indicates exercise, modality, or manual techniques to be initiated when appropriate    Assessment:    Body structures, Functions, Activity limitations: Increased pain, Decreased ADL status, Decreased posture, Decreased ROM, Decreased strength  Assessment: Pt requires significant education on anatomy and nerve pathways with good understanding. Multiple questions answered and clarified in regards to this. Initaited IDN to further improve NM mechanics and increase localized blood flow/systemic healing. Pulleys and stretches for improved mobility. Treatment Diagnosis: R wrist, finger and  shoulder limiting functional activity tolerance due to pain, decreased rom and stength  Prognosis: Good  Patient Education: significant education on nerve pathways, anatomy of current condition, findings from evaluation, course of treatment, DN procedure and efficacy, sleeping postures/positions    Goals:  Short term goals  Time Frame for Short term goals: 2 weeks  Short term goal 1: Initiate written HEP for symptom management. Short term goal 2: Pain-free R shoulder flex, ext, abd and forearm pronation/supination AROM to increase 10-20deg allowing an increase in ADL tolerance. Short term goal 3: Decrease R wrist pain </=3/10 to assist with improved functional gains during ADL's. Long term goals  Time Frame for Long term goals : 4-6 weeks  Long term goal 1: Pain-free R wrist AROM to increase 10-20 deg allowing an increase in ADL tolerance. Long term goal 2: Improve R mid scap strength 4-/5 to allow patient to improve poture awareness. Long term goal 3: Pt demo improved overall function by reporting greater than 60% per functional survey score  Long term goal 4: Improve R UE strength 4/5 to 4+/5 to allow patient to improve functional tolerance during higher level activity  Progress toward goals: to be determined     POST-PAIN       Pain Rating (0-10 pain scale): Not tested d/t clinical over site  /10   Location and pain description same as pre-treatment unless indicated.    Action: [] NA   [x] Perform HEP  [] Meds as prescribed  [] Modalities as prescribed   [] Call Physician     Frequency/Duration:  Plan  Times per week: 2  Plan weeks: 4-6  Specific instructions for Next Treatment: consider ENS with DN  Current Treatment Recommendations: Strengthening, ROM, Home Exercise Program, Integrated Dry Needling, Manual Therapy - Soft Tissue Mobilization, Modalities  Plan Comment: Transfer pt care Kam Lind DPT     Pt to continue current HEP. See objective section for any therapeutic exercise changes, additions or modifications this date.     PT Individual Minutes  Time In: 1130  Time Out: 8832  Minutes: 44  Timed Code Treatment Minutes: 42 Minutes  Procedure Minutes:  0     Timed Activity Minutes Units   Ther Ex 20 2   Manual  3 0   Neuro ReEducation 19 1       Signature:  Electronically signed by Kam Lind PT on 5/21/21 at 12:14 PM EDT

## 2021-05-26 ENCOUNTER — HOSPITAL ENCOUNTER (OUTPATIENT)
Dept: PHYSICAL THERAPY | Age: 71
Setting detail: THERAPIES SERIES
Discharge: HOME | End: 2021-05-26
Payer: MEDICARE

## 2021-05-26 PROCEDURE — 97032 APPL MODALITY 1+ESTIM EA 15: CPT

## 2021-05-26 PROCEDURE — 97112 NEUROMUSCULAR REEDUCATION: CPT

## 2021-05-26 PROCEDURE — 97110 THERAPEUTIC EXERCISES: CPT

## 2021-05-26 ASSESSMENT — PAIN DESCRIPTION - ORIENTATION: ORIENTATION: RIGHT

## 2021-05-26 ASSESSMENT — PAIN DESCRIPTION - LOCATION: LOCATION: HAND

## 2021-05-26 ASSESSMENT — PAIN SCALES - GENERAL: PAINLEVEL_OUTOF10: 10

## 2021-05-28 ENCOUNTER — HOSPITAL ENCOUNTER (OUTPATIENT)
Dept: PHYSICAL THERAPY | Age: 71
Setting detail: THERAPIES SERIES
Discharge: HOME | End: 2021-05-28
Payer: MEDICARE

## 2021-05-28 NOTE — PROGRESS NOTES
Therapy                            Cancellation/No-show Note      Date:  2021  Patient Name:  Brett Person  :  1950   MRN:  [id number]  Referring Practitioner: Dr Abdulkadir Yao  Diagnosis: Carpal tunnel syndrome of R wrist without surgical intervention    Visit Information:  PT Visit Information  Onset Date: 21 (initial problem 2018 after paralyzed from arthritis and surgical intervention helped gain UE use back.)  PT Insurance Information: A[employer]na Medicare  Total # of Visits Approved:  (BMN)  Total # of Visits to Date: 3  Plan of Care/Certification Expiration Date: 21  No Show: 0  Canceled Appointment: 1  Progress Note Counter: 3/8-10 CX 21    For today's appointment patient:  [x]  Cancelled  []  Rescheduled appointment  []  No-show   []  Called pt to remind of next appointment     Reason given by patient:  []  Patient ill  []  Conflicting appointment  []  No transportation    []  Conflict with work  []  No reason given  [x]  Other:  \"won't make it in time\"    [x] Pt has future appointments scheduled, no follow up needed  [] Pt requests to be on hold.     Reason:   If > 2 weeks please discuss with therapist.  [] Therapist to call pt for follow up     Comments:       Signature: Electronically signed by Aron Barnes PTA on 21 at 1:22 PM EDT

## 2021-06-01 ENCOUNTER — HOSPITAL ENCOUNTER (OUTPATIENT)
Dept: PHYSICAL THERAPY | Age: 71
Setting detail: THERAPIES SERIES
Discharge: HOME | End: 2021-06-01
Payer: MEDICARE

## 2021-06-01 PROCEDURE — 97032 APPL MODALITY 1+ESTIM EA 15: CPT

## 2021-06-01 PROCEDURE — 97110 THERAPEUTIC EXERCISES: CPT

## 2021-06-01 ASSESSMENT — PAIN DESCRIPTION - LOCATION: LOCATION: HAND

## 2021-06-01 ASSESSMENT — PAIN DESCRIPTION - ORIENTATION: ORIENTATION: RIGHT

## 2021-06-01 ASSESSMENT — PAIN DESCRIPTION - PAIN TYPE: TYPE: CHRONIC PAIN

## 2021-06-01 ASSESSMENT — PAIN DESCRIPTION - DESCRIPTORS: DESCRIPTORS: ACHING

## 2021-06-01 NOTE — PROGRESS NOTES
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 HEP Compliance:  [x] Good [] Fair [] Poor [] Reports not doing due to:    Vital Signs  Patient Currently in Pain: Yes   Pain Screening  Patient Currently in Pain: Yes  Pain Assessment  Pain Assessment: 0-10  Pain Level:  (4.5/10)  Pain Type: Chronic pain  Pain Location: Hand  Pain Orientation: Right  Pain Descriptors: Aching    OBJECTIVE:   Exercises  Exercise 1: pulleys x 3 mins  Exercise 2: rubberband finger ext*  Exercise 3: wrist flex/ext stretch 30\"x3  Exercise 4: scap retraction 5\"x15 seated  Exercise 5: serratus punch*  Exercise 6: seated cervical retractions 5\"x10  Exercise 7: standing against wall: 90/90 shoudler flex, abd, horizontal abd x10 ea.   Exercise 10: corner stretch 30\"x3 with wrist in neutral position, UE's at 90 deg  Exercise 11: wrist AROM x2 min flex/ext x2 min RD/UD in conjunction with fluidotherapy for improved mobility  Exercise 12: hand open/close in conjunction with fluidotherapy for improved mobility x2 mins  Exercise 13: fingerglides (DIP and PIP flex/ext) in conjuncction with fluidotherapy for improved ROM x4 mins  Exercise 20: HEP: scap retract, corner stretch, cervical retractions    Strength: [x] NT  [] MMT completed:    ROM: [x] NT  [] ROM measurements:     Manual:   Manual therapy  Other: IDN to further improve NM mechanics, tissue mobility, and decrease pain (please refer to homeostatic paper for specific points needled to be scanned into chart at D/C) in conjunction with ENS; ENS x5 mins    Modalities:  Modalities  E-stim (parameters): ENS with IDN x5 mins  Other: fluidotherapy in conjunction with exercise     *Indicates exercise, modality, or manual techniques to be initiated when appropriate    Assessment: Body structures, Functions, Activity limitations: Increased pain, Decreased ADL status, Decreased posture, Decreased ROM, Decreased strength  Assessment: Progressed ther ex for improved postural strengthening and awareness with good tolerance. Pt with c/o \"pins and needles\" during pulleys and with corner strech with palms against wall, improved with wrist positioning in neutral. Challenged with B shoulder horizontal abd against wall with increased fatigue and RB's required to complete 10 reps. Slight increase pain post exertion this date, advised pt to cont HEP as tolerated and rest/not overdoing activity at home. Treatment Diagnosis: R wrist, finger and  shoulder limiting functional activity tolerance due to pain, decreased rom and stength  Prognosis: Good  Patient Education: Postural awareness in mirror with plumbline description    Goals:  Short term goals  Time Frame for Short term goals: 2 weeks  Short term goal 1: Initiate written HEP for symptom management. Short term goal 2: Pain-free R shoulder flex, ext, abd and forearm pronation/supination AROM to increase 10-20deg allowing an increase in ADL tolerance. Short term goal 3: Decrease R wrist pain </=3/10 to assist with improved functional gains during ADL's. Long term goals  Time Frame for Long term goals : 4-6 weeks  Long term goal 1: Pain-free R wrist AROM to increase 10-20 deg allowing an increase in ADL tolerance.   Long term goal 2: Improve R mid scap strength 4-/5 to allow patient to improve poture awareness. Long term goal 3: Pt demo improved overall function by reporting greater than 60% per functional survey score  Long term goal 4: Improve R UE strength 4/5 to 4+/5 to allow patient to improve functional tolerance during higher level activity  Progress toward goals:fair d/t contd pain     POST-PAIN       Pain Rating (0-10 pain scale):  6-7 /10   Location and pain description same as pre-treatment unless indicated. Action: [] NA   [x] Perform HEP  [] Meds as prescribed  [] Modalities as prescribed   [] Call Physician     Frequency/Duration:  Plan  Times per week: 2  Plan weeks: 4-6  Current Treatment Recommendations: Strengthening, ROM, Home Exercise Program, Integrated Dry Needling, Manual Therapy - Soft Tissue Mobilization, Modalities  Plan Comment: Transfer pt care Tha Irwin DPT     Pt to continue current HEP. See objective section for any therapeutic exercise changes, additions or modifications this date.     PT Individual Minutes  Time In: 6592  Time Out: 6800  Minutes: 57  Timed Code Treatment Minutes: 57 Minutes  Procedure Minutes: 0     Timed Activity Minutes Units   Ther Ex 52 3   Direct Estim 5 1       Signature:  Electronically signed by Tha Irwin PT on 6/1/21 at 10:44 AM EDT

## 2021-06-04 ENCOUNTER — HOSPITAL ENCOUNTER (OUTPATIENT)
Dept: PHYSICAL THERAPY | Age: 71
Setting detail: THERAPIES SERIES
Discharge: HOME | End: 2021-06-04
Payer: MEDICARE

## 2021-06-08 ENCOUNTER — TELEPHONE (OUTPATIENT)
Dept: FAMILY MEDICINE CLINIC | Age: 71
End: 2021-06-08

## 2021-06-08 ENCOUNTER — HOSPITAL ENCOUNTER (OUTPATIENT)
Dept: PHYSICAL THERAPY | Age: 71
Setting detail: THERAPIES SERIES
Discharge: HOME | End: 2021-06-08
Payer: MEDICARE

## 2021-06-08 PROCEDURE — 97110 THERAPEUTIC EXERCISES: CPT

## 2021-06-08 ASSESSMENT — PAIN DESCRIPTION - DESCRIPTORS: DESCRIPTORS: ACHING;PINS AND NEEDLES

## 2021-06-08 ASSESSMENT — PAIN SCALES - GENERAL: PAINLEVEL_OUTOF10: 5

## 2021-06-08 ASSESSMENT — PAIN DESCRIPTION - PAIN TYPE: TYPE: CHRONIC PAIN

## 2021-06-08 ASSESSMENT — PAIN DESCRIPTION - LOCATION: LOCATION: HAND

## 2021-06-08 ASSESSMENT — PAIN DESCRIPTION - ORIENTATION: ORIENTATION: RIGHT

## 2021-06-08 NOTE — TELEPHONE ENCOUNTER
Patient stopped in the office today to give two names of carpal tunnel surgeons given to him by this physical therapist. He would like to have the minimally invasive procedure for carpal tunnel. 1. Dr. Maria Del Carmen Clements.    2. Dr. Roque Hardin/Lata- [phone]. Patient would like a call from you if possible to discuss.      Eh's phone number- [phone] (O)        [phone] (T)

## 2021-06-08 NOTE — PROGRESS NOTES
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 Pt reports was feeling \"pretty good\" until hanging bathroom curtain the other day with increased pain. Pt reports not having much improvement in pain levels. Pt rpeorts having significant contd difficulty with sleeping d/t pain in hand. HEP Compliance:  [x] Good [] Fair [] Poor [] Reports not doing due to:    Vital Signs  Patient Currently in Pain: Yes   Pain Screening  Patient Currently in Pain: Yes  Pain Assessment  Pain Assessment: 0-10  Pain Level: 5  Pain Type: Chronic pain  Pain Location: Hand  Pain Orientation: Right  Pain Descriptors: Aching;Pins and needles    OBJECTIVE:   Exercises  Exercise 1: pulleys x 4 mins  Exercise 2: rubberband finger ext 5\"x10  Exercise 3: wrist exerciser x5  Exercise 4: scap retraction 5\"x15 standing against wall  Exercise 5: serratus punch supine 5\"x10  Exercise 6: seated cervical retractions 5\"x10  Exercise 7: standing against wall: 90/90 shoudler flex, abd, horizontal abd x10 ea.   Exercise 8: sleeping/posture eductaion x2 mins  Exercise 10: corner stretch 30\"x2- unable to perform this date d/t increased symptoms  Exercise 11: wrist AROM x2 min flex/ext x2 min RD/UD in conjunction with fluidotherapy for improved mobility  Exercise 12: hand open/close in conjunction with fluidotherapy for improved mobility x2 mins  Exercise 13: fingerglides (DIP and PIP flex/ext) in conjuncction with fluidotherapy for improved ROM x4 mins  Exercise 20: HEP: 90/90 shoudler flex, abd, horizontal abd, cevrical retraction against wall    Strength: [] NT  [] MMT completed:    ROM: [] NT  [x] ROM measurements:  AROM RUE (degrees)  R Shoulder Flexion 0-180: 120  R Shoulder Extension 0-45: 40  R Shoulder ABduction 0-180: 115  R Shoulder Int Rotation  0-70: Reach L4  R Shoulder Ext Rotation 0-90: base of occiput  R Forearm Pron 0-90: 80  R Forearm Supination  0-90: 50  R Wrist Flexion 0-80: 65  R Wrist Extension 0-70: 56  R Wrist Radial Deviation 0-20: 18  R Wrist Ulnar Deviation 0-45: 32     Manual:   Manual therapy  Other: Held this date to determine effect    Modalities:  Modalities  E-stim (parameters): Held this date to determine effect  Other: fluidotherapy in conjunction with exercise     *Indicates exercise, modality, or manual techniques to be initiated when appropriate    Assessment: Body structures, Functions, Activity limitations: Increased pain, Decreased ADL status, Decreased posture, Decreased ROM, Decreased strength  Assessment: Pt with increased \"pins and needles\" with corner stretch this date therefore held. Pt also with pins and needles sensation with SA punch and maintaining UE's in elvetaed position. Otherwise pt with good tolerance to tx and progressions of ther ex for increased mobility and postural awareness. Pt with some relief of symptoms following tx this date. Treatment Diagnosis: R wrist, finger and  shoulder limiting functional activity tolerance due to pain, decreased rom and stength  Prognosis: Good     Goals:  Short term goals  Time Frame for Short term goals: 2 weeks  Short term goal 1: Initiate written HEP for symptom management.   Short term goal 2: Pain-free R shoulder flex, ext, abd and forearm pronation/supination AROM to increase 10-20deg allowing an increase in ADL tolerance. Short term goal 3: Decrease R wrist pain </=3/10 to assist with improved functional gains during ADL's. Long term goals  Time Frame for Long term goals : 4-6 weeks  Long term goal 1: Pain-free R wrist AROM to increase 10-20 deg allowing an increase in ADL tolerance. Long term goal 2: Improve R mid scap strength 4-/5 to allow patient to improve poture awareness. Long term goal 3: Pt demo improved overall function by reporting greater than 60% per functional survey score  Long term goal 4: Improve R UE strength 4/5 to 4+/5 to allow patient to improve functional tolerance during higher level activity  Progress toward goals: improved R shoulder and wrist AROM    POST-PAIN       Pain Rating (0-10 pain scale):  2 /10   Location and pain description same as pre-treatment unless indicated. Action: [] NA   [x] Perform HEP  [] Meds as prescribed  [] Modalities as prescribed   [] Call Physician     Frequency/Duration:  Plan  Times per week: 2  Plan weeks: 4-6  Current Treatment Recommendations: Strengthening, ROM, Home Exercise Program, Integrated Dry Needling, Manual Therapy - Soft Tissue Mobilization, Modalities  Plan Comment: Transfer pt care Meliton Gallo DPT     Pt to continue current HEP. See objective section for any therapeutic exercise changes, additions or modifications this date.     PT Individual Minutes  Time In: 0927  Time Out: 1016  Minutes: 57  Timed Code Treatment Minutes: 55 Minutes  Procedure Minutes: 0     Timed Activity Minutes Units   Ther Ex 55 4       Signature:  Electronically signed by Meliton Gallo PT on 6/8/21 at 11:28 AM EDT

## 2021-06-10 ENCOUNTER — HOSPITAL ENCOUNTER (OUTPATIENT)
Dept: PHYSICAL THERAPY | Age: 71
Setting detail: THERAPIES SERIES
Discharge: HOME | End: 2021-06-10
Payer: MEDICARE

## 2021-06-10 PROCEDURE — 97140 MANUAL THERAPY 1/> REGIONS: CPT

## 2021-06-10 PROCEDURE — 97110 THERAPEUTIC EXERCISES: CPT

## 2021-06-10 ASSESSMENT — PAIN DESCRIPTION - DESCRIPTORS: DESCRIPTORS: ACHING;PINS AND NEEDLES

## 2021-06-10 ASSESSMENT — PAIN DESCRIPTION - ORIENTATION: ORIENTATION: RIGHT

## 2021-06-10 ASSESSMENT — PAIN SCALES - GENERAL: PAINLEVEL_OUTOF10: 2

## 2021-06-10 ASSESSMENT — PAIN DESCRIPTION - PAIN TYPE: TYPE: CHRONIC PAIN

## 2021-06-10 ASSESSMENT — PAIN DESCRIPTION - LOCATION: LOCATION: HAND

## 2021-06-10 NOTE — PROGRESS NOTES
completed:  Strength RUE  Comment: Lats 4/5 MT, 3+/5 Rhomboids 4-/5 LT 3-/5  R Shoulder Flexion: 4/5  R Shoulder ABduction: 4/5  R Shoulder Internal Rotation: 4+/5  R Shoulder External Rotation: 4+/5  R Elbow Flexion: 4+/5  R Elbow Extension: 4+/5  R Forearm Pron: 4/5  R Forearm Sup: 4/5  R Wrist Flexion: 4/5  R Wrist Extension: 4/5  R Wrist Radial Deviation: 4/5  R Wrist Ulnar Deviation: 4/5    ROM: [] NT  [x] ROM measurements:  AROM RUE (degrees)  RUE General AROM: Measurements taken 6/8/21  R Shoulder Flexion 0-180: 120  R Shoulder Extension 0-45: 40  R Shoulder ABduction 0-180: 115  R Shoulder Int Rotation  0-70: Reach L4  R Shoulder Ext Rotation 0-90: base of occiput  R Forearm Pron 0-90: 80  R Forearm Supination  0-90: 50  R Wrist Flexion 0-80: 65  R Wrist Extension 0-70: 56  R Wrist Radial Deviation 0-20: 18  R Wrist Ulnar Deviation 0-45: 32    Modalities:  Modalities  Other: fluidotherapy in conjunction with exercise     *Indicates exercise, modality, or manual techniques to be initiated when appropriate    Assessment: Body structures, Functions, Activity limitations: Increased pain, Decreased ADL status, Decreased posture, Decreased ROM, Decreased strength  Assessment: Pt with variable progress towards goals. Pt demos overall improvement in R shoudler, wrist, and forearm AROM and UE strength. However, pt conts to have significant limitations and pain in R hand reaching 10/10 at worst dependent on activity level. It is further recommended that pt be placed on hold at this time to self manage symptoms indep with HEP. Treatment Diagnosis: R wrist, finger and  shoulder limiting functional activity tolerance due to pain, decreased rom and stength  Prognosis: Good     Goals:  Short term goals  Time Frame for Short term goals: 2 weeks  Short term goal 1: Initiate written HEP for symptom management.   Short term goal 2: Pain-free R shoulder flex, ext, abd and forearm pronation/supination AROM to increase 10-20deg

## 2021-06-10 NOTE — PROGRESS NOTES
[address]  PHYSICAL THERAPY PLAN OF CARE   [facility]. [facility], [address] [place] [address]         Ph: [phone]  Fax: [phone]    [] Certification  [] Recertification []  Plan of Care  [x] Progress Note [] Discharge      To:  Dr Alin Rivas      From:  Delmy Vazquez, PT, DPT  Patient: Bartolome White     : 1950  Diagnosis: Carpal tunnel syndrome of R wrist without surgical intervention     Date: 6/10/2021  Treatment Diagnosis: R wrist, finger and  shoulder limiting functional activity tolerance due to pain, decreased rom and stength    Plan of Care/Certification Expiration Date: 21  Progress Report Period from:  2021  to 6/10/2021    Total # of Visits to Date: 6   No Show: 0    Canceled Appointment: 2     OBJECTIVE:   Short Term Goals - Time Frame for Short term goals: 2 weeks    Goals Current/Discharge status  Met   Short term goal 1: Initiate written HEP for symptom management. Pt provided written updated HEP with good understanding  [x] yes  [] no   Short term goal 2: Pain-free R shoulder flex, ext, abd and forearm pronation/supination AROM to increase 10-20deg allowing an increase in ADL tolerance.   AROM RUE (degrees)  RUE General AROM: Measurements taken 21  R Shoulder Flexion 0-180: 120  R Shoulder Extension 0-45: 40  R Shoulder ABduction 0-180: 115  R Shoulder Int Rotation  0-70: Reach L4  R Shoulder Ext Rotation 0-90: base of occiput  R Forearm Pron 0-90: 80  R Forearm Supination  0-90: 50  R Wrist Flexion 0-80: 65  R Wrist Extension 0-70: 56  R Wrist Radial Deviation 0-20: 18  R Wrist Ulnar Deviation 0-45: 32 [x] yes  [x] no   Short term goal 3: Decrease R wrist pain </=3/10 to assist with improved functional gains during ADL's.  10/10 at worst  [] yes  [x] no     Long Term Goals - Time Frame for Long term goals : 4-6 weeks  Goals Current/ Discharge status Met   Long term goal 1: Pain-free R wrist AROM to increase 10-20 deg allowing an increase in ADL tolerance. AROM RUE (degrees)  RUE General AROM: Measurements taken 6/8/21  R Shoulder Flexion 0-180: 120  R Shoulder Extension 0-45: 40  R Shoulder ABduction 0-180: 115  R Shoulder Int Rotation  0-70: Reach L4  R Shoulder Ext Rotation 0-90: base of occiput  R Forearm Pron 0-90: 80  R Forearm Supination  0-90: 50  R Wrist Flexion 0-80: 65  R Wrist Extension 0-70: 56  R Wrist Radial Deviation 0-20: 18  R Wrist Ulnar Deviation 0-45: 32 [x] yes  [x] no   Long term goal 2: Improve R mid scap strength 4-/5 to allow patient to improve poture awareness. Strength RUE  Comment: Lats 4/5 MT, 3+/5 Rhomboids 4-/5 LT 3-/5 [x] yes  [x] no   Long term goal 3: Pt demo improved overall function by reporting greater than 60% per functional survey score UEFI 23/80 [] yes  [x] no   Long term goal 4: Improve R UE strength 4/5 to 4+/5 to allow patient to improve functional tolerance during higher level activity R Shoulder Flexion: 4/5  R Shoulder ABduction: 4/5  R Shoulder Internal Rotation: 4+/5  R Shoulder External Rotation: 4+/5  R Elbow Flexion: 4+/5  R Elbow Extension: 4+/5  R Forearm Pron: 4/5  R Forearm Sup: 4/5  R Wrist Flexion: 4/5  R Wrist Extension: 4/5  R Wrist Radial Deviation: 4/5  R Wrist Ulnar Deviation: 4/5 [x] yes  [x] no       Body structures, Functions, Activity limitations: Increased pain, Decreased ADL status, Decreased posture, Decreased ROM, Decreased strength  Assessment: Pt with variable progress towards goals. Pt demos overall improvement in R shoudler, wrist, and forearm AROM and UE strength. However, pt conts to have significant limitations and pain in R hand reaching 10/10 at worst dependent on activity level. It is further recommended that pt be placed on hold at this time to self manage symptoms indep with HEP.   Specific instructions for Next Treatment: Hold x2 weeks to self manage symptoms  Prognosis: Good  Discharge Recommendations: Continue to assess pending progress     PLAN: Frequency/Duration:  Plan  Times per week: 2  Plan weeks: 4-6  Specific instructions for Next Treatment: Hold x2 weeks to self manage symptoms  Current Treatment Recommendations: Strengthening, ROM, Home Exercise Program, Integrated Dry Needling, Manual Therapy - Soft Tissue Mobilization, Modalities  Plan Comment: Transfer pt care Alesha Avila DPT                  Patient Status:Hold to self manage symptoms x2 weeks, therapist to f/u 6/24/21    Signature: Electronically signed by Alesha Avila PT on 6/10/21 at 10:46 AM EDT    If you have any questions or concerns, please don't hesitate to call. Thank you for your referral.    I have reviewed this plan of care and certify a need for medically necessary rehabilitation services.     Physician Signature:__________________________________________________________  Date:  Please sign and return

## 2021-06-11 RX ORDER — DOXEPIN HYDROCHLORIDE 3 MG/1
3 TABLET ORAL NIGHTLY PRN
Qty: 90 TABLET | Refills: 1 | Status: SHIPPED | OUTPATIENT
Start: 2021-06-11

## 2021-06-11 NOTE — TELEPHONE ENCOUNTER
I asked staff to call earlier this week   Not sure if this was done  Referral placed  Let him know    Dr. Santos axon- [phone].

## 2021-06-11 NOTE — TELEPHONE ENCOUNTER
Faxed referral (this is usually [facility]), called   Dr Tremaine Martin can see him 6/21 at 10 am. [facility] for pt.

## 2021-06-11 NOTE — TELEPHONE ENCOUNTER
Called and let Joseph Snyder know the referral was placed. Asking that you send his prescription for Silenor over [facility]SA, it's pending. He states he hasn't slept well in 4 weeks due to his carpal tunnel, he states he is going to need emergency surgery.

## 2021-06-11 NOTE — TELEPHONE ENCOUNTER
Guillermo Iqbal called office checking status of referral request for his carpal tunnel. He states his phys therapist recommended Dr Nighat Nicolas for Orthopedics [phone]  Due to needing surgery right away.

## 2021-06-15 ENCOUNTER — HOSPITAL ENCOUNTER (OUTPATIENT)
Dept: PHYSICAL THERAPY | Age: 71
Setting detail: THERAPIES SERIES
End: 2021-06-15
Payer: MEDICARE

## 2021-06-18 ENCOUNTER — APPOINTMENT (OUTPATIENT)
Dept: PHYSICAL THERAPY | Age: 71
End: 2021-06-18
Payer: MEDICARE

## 2021-06-22 ENCOUNTER — APPOINTMENT (OUTPATIENT)
Dept: PHYSICAL THERAPY | Age: 71
End: 2021-06-22
Payer: MEDICARE

## 2021-06-24 ENCOUNTER — APPOINTMENT (OUTPATIENT)
Dept: PHYSICAL THERAPY | Age: 71
End: 2021-06-24
Payer: MEDICARE

## 2021-06-24 NOTE — PROGRESS NOTES
[address]  PHYSICAL THERAPY PLAN OF CARE   [address] [place], [address] [facility]         Ph: [phone]  Fax: [phone]    [] Certification  [] Recertification []  Plan of Care  [] Progress Note [x] Discharge      To:  Dr Benny Martins      From:  Kam Lind, PT, DPT  Patient: Jayda Alvarado     : 1950  Diagnosis: Carpal tunnel syndrome of R wrist without surgical intervention     Date: 2021  Treatment Diagnosis: R wrist, finger and  shoulder limiting functional activity tolerance due to pain, decreased rom and stength    Plan of Care/Certification Expiration Date: 21  Progress Report Period from:  6/10/2021  to 6/10/2021    Total # of Visits to Date: 6   No Show: 0    Canceled Appointment: 2     OBJECTIVE:   Short Term Goals - Time Frame for Short term goals: 2 weeks    Goals Current/Discharge status  Met   Short term goal 1: Initiate written HEP for symptom management. Pt provided written updated HEP with good understanding  [x]? yes  []? no   Short term goal 2: Pain-free R shoulder flex, ext, abd and forearm pronation/supination AROM to increase 10-20deg allowing an increase in ADL tolerance. AROM RUE (degrees)  RUE General AROM: Measurements taken 21  R Shoulder Flexion 0-180: 120  R Shoulder Extension 0-45: 40  R Shoulder ABduction 0-180: 115  R Shoulder Int Rotation  0-70: Reach L4  R Shoulder Ext Rotation 0-90: base of occiput  R Forearm Pron 0-90: 80  R Forearm Supination  0-90: 50  R Wrist Flexion 0-80: 65  R Wrist Extension 0-70: 56  R Wrist Radial Deviation 0-20: 18  R Wrist Ulnar Deviation 0-45: 32 [x]?  yes  [x]? no   Short term goal 3: Decrease R wrist pain </=3/10 to assist with improved functional gains during ADL's.  10/10 at worst  []? yes  [x]? no      Long Term Goals - Time Frame for Long term goals : 4-6 weeks  Goals Current/ Discharge status Met   Long term goal 1: Pain-free R wrist AROM to increase 10-20 deg allowing an increase in ADL tolerance. AROM RUE (degrees)  RUE General AROM: Measurements taken 6/8/21  R Shoulder Flexion 0-180: 120  R Shoulder Extension 0-45: 40  R Shoulder ABduction 0-180: 115  R Shoulder Int Rotation  0-70: Reach L4  R Shoulder Ext Rotation 0-90: base of occiput  R Forearm Pron 0-90: 80  R Forearm Supination  0-90: 50  R Wrist Flexion 0-80: 65  R Wrist Extension 0-70: 56  R Wrist Radial Deviation 0-20: 18  R Wrist Ulnar Deviation 0-45: 32 [x]? yes  [x]? no   Long term goal 2: Improve R mid scap strength 4-/5 to allow patient to improve poture awareness. Strength RUE  Comment: Lats 4/5 MT, 3+/5 Rhomboids 4-/5 LT 3-/5 [x]? yes  [x]? no   Long term goal 3: Pt demo improved overall function by reporting greater than 60% per functional survey score UEFI 23/80 []? yes  [x]? no   Long term goal 4: Improve R UE strength 4/5 to 4+/5 to allow patient to improve functional tolerance during higher level activity R Shoulder Flexion: 4/5  R Shoulder ABduction: 4/5  R Shoulder Internal Rotation: 4+/5  R Shoulder External Rotation: 4+/5  R Elbow Flexion: 4+/5  R Elbow Extension: 4+/5  R Forearm Pron: 4/5  R Forearm Sup: 4/5  R Wrist Flexion: 4/5  R Wrist Extension: 4/5  R Wrist Radial Deviation: 4/5  R Wrist Ulnar Deviation: 4/5 [x]? yes  [x]? no        Body structures, Functions, Activity limitations: Increased pain, Decreased ADL status, Decreased posture, Decreased ROM, Decreased strength  Assessment: Please refer to PN dated 6/10/21 for formal assessment. Pt failed to return to PT after being placed on hold 6/10/21. Pt reports he is scheduled for carpal tunnel surgery tomorrow. D/C further PT at this time. Prognosis: Good    PLAN: D/C                  Patient Status:[] Continue/ Initiate plan of Care    [x] Discharge PT. Recommend pt continue with HEP.      [] Additional visits requested, Please re-certify for additional visits:        Signature: Electronically signed by Patel Espinoza PT on 6/24/21 at 9:08 AM EDT    If you have any questions or concerns, please don't hesitate to call. Thank you for your referral.    I have reviewed this plan of care and certify a need for medically necessary rehabilitation services.     Physician Signature:__________________________________________________________  Date:  Please sign and return

## 2021-08-04 ENCOUNTER — TELEPHONE (OUTPATIENT)
Dept: CASE MANAGEMENT | Age: 71
End: 2021-08-04

## 2021-08-13 ENCOUNTER — HOSPITAL ENCOUNTER (OUTPATIENT)
Dept: CT IMAGING | Age: 71
Discharge: HOME | End: 2021-08-15
Payer: MEDICARE

## 2021-08-13 DIAGNOSIS — Z87.891 PERSONAL HISTORY OF TOBACCO USE: ICD-10-CM

## 2021-08-13 PROCEDURE — 71271 CT THORAX LUNG CANCER SCR C-: CPT

## 2021-11-01 ENCOUNTER — TELEPHONE (OUTPATIENT)
Dept: FAMILY MEDICINE CLINIC | Age: 71
End: 2021-11-01

## 2021-11-01 NOTE — TELEPHONE ENCOUNTER
Patient stopped in the office to let you know his brother passed away and he missed his Thyroid US as a result. Gave him information to get it reschedule as well as made sure he comes in to have his fasting labs completed soon.  Will follow up once all is resulted

## 2022-01-25 NOTE — TELEPHONE ENCOUNTER
Pt just wanted to give you info as to his future appts. States he is seeing Dr. Wilkins Moritz in [place] (Dr who did original surgery) on 2/26/2020 in consultation as MRI showed that he is in need of another surgery. Therefore has to cancel Pulm appt with Dr. Mary Greenfield. He will reschedule that with their office. Also wanted you to know that there is no c-spine infection. He does have colonoscopy scheduled for March, as well. No

## 2022-08-29 NOTE — TELEPHONE ENCOUNTER
Physician documentation on smoking history and CT Lung Screening reviewed. All required documentation complete. Patient is a current some day smoker with a 45 pack year history ( 1.50 ppd x 30 years) per physician documentation. Start taking Aleve twice daily for the 5 days prior to your menstrua cycle.    Imitrex and Zofran can be taken for migraines.    B Complex Vitamins     Magnesium can be taken at bedtime to help prevent headaches.

## 2022-09-26 NOTE — PROGRESS NOTES
Niranjan Cruz is a 80 y.o. male non-smoker, remote history of neck injury, depression, who presents for evaluation of:     Chief Complaint   Patient presents with    Hand Pain     bilateral, left is worse, no recent trauma, had repeat cervical spine surgery 2 weeks ago, this has been uneventful       Interim history: Since his last office visit with me 2 months ago, the patient had yet another cervical spine surgery, which was uneventful. He started physical therapy. He feels less improvement than expected in the weakness and pain in the left hand. He was advised by the therapist to seek more evaluation to rule out other problems such as arthritis, carpal tunnel syndrome, trigger finger. The following laboratory reports since the past visit were reviewed (the ones pertinent to today's visit were discussed with the patient/ caregiver): No visits with results within 3 Month(s) from this visit. Latest known visit with results is:   Orders Only on 05/20/2019   Component Date Value Ref Range Status    Protime 05/20/2019 10.5  9.0 - 11.5 sec Final    Comment: Heparin Therapeutic Range: 39.0 - 65.0 seconds. Effective 11/9/18 10:00am  Please note therapeutic ranges have changed.  INR 05/20/2019 1.0   Final    Comment: Recommended INR therapeutic ranges for oral anticoagulant  therapy    Prophylaxis/treatment of:                       INR     Venous Thrombosis, Pulmonary Embolism       2.0-3  Prevention of Systemic Embolism from:     Atrial Fibrillation                         2.0-3.0     Myocardial Infarction                       2.0-3.0     Mechanical Prosthetics Heart Valves         2.5-3.5     Recurrent Systemic Embolism                 2.5-3.5  Guidelines for patients with coagulopathy, e.g. liver disease:  Use the Protime resulted in seconds. Mild        12.9-17.0 sec    Moderate    17.1-22.6 sec    Severe      G.T. 22.6 sec         HPI:    Hand Pain    There was no injury mechanism.  The Malnutrition...

## 2022-12-28 NOTE — TELEPHONE ENCOUNTER
Prepared, and ready to be signed on your desk [No Acute Distress] : no acute distress [Well Nourished] : well nourished [Well Developed] : well developed [Well-Appearing] : well-appearing [Normal Sclera/Conjunctiva] : normal sclera/conjunctiva [PERRL] : pupils equal round and reactive to light [EOMI] : extraocular movements intact [Normal Outer Ear/Nose] : the outer ears and nose were normal in appearance [Normal Oropharynx] : the oropharynx was normal [No JVD] : no jugular venous distention [No Lymphadenopathy] : no lymphadenopathy [Supple] : supple [Thyroid Normal, No Nodules] : the thyroid was normal and there were no nodules present [No Respiratory Distress] : no respiratory distress  [No Accessory Muscle Use] : no accessory muscle use [Clear to Auscultation] : lungs were clear to auscultation bilaterally [Normal Rate] : normal rate  [Regular Rhythm] : with a regular rhythm [Normal S1, S2] : normal S1 and S2 [No Murmur] : no murmur heard [No Carotid Bruits] : no carotid bruits [No Abdominal Bruit] : a ~M bruit was not heard ~T in the abdomen [No Varicosities] : no varicosities [Pedal Pulses Present] : the pedal pulses are present [No Edema] : there was no peripheral edema [No Palpable Aorta] : no palpable aorta [No Extremity Clubbing/Cyanosis] : no extremity clubbing/cyanosis [Soft] : abdomen soft [Non Tender] : non-tender [Non-distended] : non-distended [No Masses] : no abdominal mass palpated [No HSM] : no HSM [Normal Bowel Sounds] : normal bowel sounds [Normal Posterior Cervical Nodes] : no posterior cervical lymphadenopathy [Normal Anterior Cervical Nodes] : no anterior cervical lymphadenopathy [No CVA Tenderness] : no CVA  tenderness [No Spinal Tenderness] : no spinal tenderness [No Joint Swelling] : no joint swelling [Grossly Normal Strength/Tone] : grossly normal strength/tone [No Rash] : no rash [Coordination Grossly Intact] : coordination grossly intact [No Focal Deficits] : no focal deficits [Normal Gait] : normal gait [Deep Tendon Reflexes (DTR)] : deep tendon reflexes were 2+ and symmetric [Normal Affect] : the affect was normal [Normal Insight/Judgement] : insight and judgment were intact

## 2024-01-02 ENCOUNTER — OFFICE VISIT (OUTPATIENT)
Dept: FAMILY MEDICINE CLINIC | Age: 74
End: 2024-01-02
Payer: MEDICARE

## 2024-01-02 VITALS
OXYGEN SATURATION: 97 % | BODY MASS INDEX: 22.92 KG/M2 | WEIGHT: 142.6 LBS | HEIGHT: 66 IN | SYSTOLIC BLOOD PRESSURE: 130 MMHG | HEART RATE: 73 BPM | DIASTOLIC BLOOD PRESSURE: 72 MMHG | TEMPERATURE: 97.7 F

## 2024-01-02 DIAGNOSIS — Z12.5 SCREENING FOR MALIGNANT NEOPLASM OF PROSTATE: ICD-10-CM

## 2024-01-02 DIAGNOSIS — M47.22 OTHER SPONDYLOSIS WITH RADICULOPATHY, CERVICAL REGION: ICD-10-CM

## 2024-01-02 DIAGNOSIS — Z01.812 ENCOUNTER FOR PRE-OPERATIVE LABORATORY TESTING: ICD-10-CM

## 2024-01-02 DIAGNOSIS — M48.02 CERVICAL SPINAL STENOSIS: ICD-10-CM

## 2024-01-02 DIAGNOSIS — M48.02 CERVICAL SPINAL STENOSIS: Primary | ICD-10-CM

## 2024-01-02 LAB
ALBUMIN SERPL-MCNC: 4.4 G/DL (ref 3.5–4.6)
ALP SERPL-CCNC: 83 U/L (ref 35–104)
ALT SERPL-CCNC: 21 U/L (ref 0–41)
ANION GAP SERPL CALCULATED.3IONS-SCNC: 9 MEQ/L (ref 9–15)
APTT PPP: 30.4 SEC (ref 24.4–36.8)
AST SERPL-CCNC: 29 U/L (ref 0–40)
BASOPHILS # BLD: 0 K/UL (ref 0–0.2)
BASOPHILS NFR BLD: 0.5 %
BILIRUB SERPL-MCNC: 0.4 MG/DL (ref 0.2–0.7)
BUN SERPL-MCNC: 17 MG/DL (ref 8–23)
CALCIUM SERPL-MCNC: 9.3 MG/DL (ref 8.5–9.9)
CHLORIDE SERPL-SCNC: 102 MEQ/L (ref 95–107)
CO2 SERPL-SCNC: 25 MEQ/L (ref 20–31)
CREAT SERPL-MCNC: 0.77 MG/DL (ref 0.7–1.2)
EOSINOPHIL # BLD: 0.5 K/UL (ref 0–0.7)
EOSINOPHIL NFR BLD: 5.3 %
ERYTHROCYTE [DISTWIDTH] IN BLOOD BY AUTOMATED COUNT: 11.9 % (ref 11.5–14.5)
GLOBULIN SER CALC-MCNC: 2.8 G/DL (ref 2.3–3.5)
GLUCOSE SERPL-MCNC: 86 MG/DL (ref 70–99)
HCT VFR BLD AUTO: 47.1 % (ref 42–52)
HGB BLD-MCNC: 15.5 G/DL (ref 14–18)
INR PPP: 1
LYMPHOCYTES # BLD: 2.4 K/UL (ref 1–4.8)
LYMPHOCYTES NFR BLD: 28 %
MCH RBC QN AUTO: 31.7 PG (ref 27–31.3)
MCHC RBC AUTO-ENTMCNC: 32.9 % (ref 33–37)
MCV RBC AUTO: 96.3 FL (ref 79–92.2)
MONOCYTES # BLD: 0.7 K/UL (ref 0.2–0.8)
MONOCYTES NFR BLD: 7.7 %
NEUTROPHILS # BLD: 4.9 K/UL (ref 1.4–6.5)
NEUTS SEG NFR BLD: 58.1 %
PLATELET # BLD AUTO: 388 K/UL (ref 130–400)
POTASSIUM SERPL-SCNC: 4.5 MEQ/L (ref 3.4–4.9)
PROT SERPL-MCNC: 7.2 G/DL (ref 6.3–8)
PROTHROMBIN TIME: 13.6 SEC (ref 12.3–14.9)
RBC # BLD AUTO: 4.89 M/UL (ref 4.7–6.1)
SODIUM SERPL-SCNC: 136 MEQ/L (ref 135–144)
WBC # BLD AUTO: 8.4 K/UL (ref 4.8–10.8)

## 2024-01-02 PROCEDURE — 99204 OFFICE O/P NEW MOD 45 MIN: CPT | Performed by: FAMILY MEDICINE

## 2024-01-02 PROCEDURE — 1123F ACP DISCUSS/DSCN MKR DOCD: CPT | Performed by: FAMILY MEDICINE

## 2024-01-02 RX ORDER — METHYLPREDNISOLONE 4 MG/1
TABLET ORAL
Qty: 1 KIT | Refills: 0 | Status: SHIPPED | OUTPATIENT
Start: 2024-01-02

## 2024-01-02 SDOH — ECONOMIC STABILITY: FOOD INSECURITY: WITHIN THE PAST 12 MONTHS, THE FOOD YOU BOUGHT JUST DIDN'T LAST AND YOU DIDN'T HAVE MONEY TO GET MORE.: NEVER TRUE

## 2024-01-02 SDOH — ECONOMIC STABILITY: HOUSING INSECURITY
IN THE LAST 12 MONTHS, WAS THERE A TIME WHEN YOU DID NOT HAVE A STEADY PLACE TO SLEEP OR SLEPT IN A SHELTER (INCLUDING NOW)?: NO

## 2024-01-02 SDOH — ECONOMIC STABILITY: INCOME INSECURITY: HOW HARD IS IT FOR YOU TO PAY FOR THE VERY BASICS LIKE FOOD, HOUSING, MEDICAL CARE, AND HEATING?: NOT HARD AT ALL

## 2024-01-02 SDOH — ECONOMIC STABILITY: FOOD INSECURITY: WITHIN THE PAST 12 MONTHS, YOU WORRIED THAT YOUR FOOD WOULD RUN OUT BEFORE YOU GOT MONEY TO BUY MORE.: NEVER TRUE

## 2024-01-02 ASSESSMENT — PATIENT HEALTH QUESTIONNAIRE - PHQ9
1. LITTLE INTEREST OR PLEASURE IN DOING THINGS: 0
SUM OF ALL RESPONSES TO PHQ QUESTIONS 1-9: 0
SUM OF ALL RESPONSES TO PHQ QUESTIONS 1-9: 0
SUM OF ALL RESPONSES TO PHQ9 QUESTIONS 1 & 2: 0
SUM OF ALL RESPONSES TO PHQ QUESTIONS 1-9: 0
SUM OF ALL RESPONSES TO PHQ QUESTIONS 1-9: 0
2. FEELING DOWN, DEPRESSED OR HOPELESS: 0

## 2024-01-02 NOTE — PROGRESS NOTES
Left Ear: Tympanic membrane normal.      Nose: Nose normal.      Mouth/Throat:      Mouth: Mucous membranes are dry.   Eyes:      Extraocular Movements: Extraocular movements intact.      Conjunctiva/sclera: Conjunctivae normal.      Pupils: Pupils are equal, round, and reactive to light.   Cardiovascular:      Rate and Rhythm: Normal rate and regular rhythm.      Pulses: Normal pulses.      Heart sounds: Normal heart sounds.   Pulmonary:      Effort: Pulmonary effort is normal.      Breath sounds: Normal breath sounds.   Abdominal:      General: Abdomen is flat. Bowel sounds are normal.      Palpations: Abdomen is soft.   Musculoskeletal:         General: Normal range of motion.      Cervical back: Normal range of motion and neck supple.   Skin:     General: Skin is warm and dry.   Neurological:      General: No focal deficit present.      Mental Status: He is alert.      Motor: Weakness (Of right arm and hand) present.   Psychiatric:         Mood and Affect: Mood normal.         Behavior: Behavior normal.         Thought Content: Thought content normal.         Judgment: Judgment normal.       Assessment & Plan   1. Cervical spinal stenosis-all MRIs reviewed demonstrate progressive arthritis as well as advancing stenosis.  His right arm symptoms can be debilitating at times.  MRI of cervical spine has been ordered as well as any lab work that might be needed presurgical.  -     MRI CERVICAL SPINE WO CONTRAST; Future  -     Comprehensive Metabolic Panel; Future  -     CBC with Auto Differential; Future  -     Aptt; Future  -     Protime-INR; Future  -     methylPREDNISolone (MEDROL DOSEPACK) 4 MG tablet; Take by mouth., Disp-1 kit, R-0Normal  -     Handicap Placard MISC; Starting Tue 1/2/2024, Disp-1 each, R-0, Print  2. Other spondylosis with radiculopathy, cervical region  -     MRI CERVICAL SPINE WO CONTRAST; Future  -     methylPREDNISolone (MEDROL DOSEPACK) 4 MG tablet; Take by mouth., Disp-1 kit,

## 2024-01-10 ENCOUNTER — HOSPITAL ENCOUNTER (OUTPATIENT)
Dept: MRI IMAGING | Age: 74
Discharge: HOME | End: 2024-01-12
Attending: FAMILY MEDICINE
Payer: MEDICARE

## 2024-01-10 DIAGNOSIS — M47.22 OTHER SPONDYLOSIS WITH RADICULOPATHY, CERVICAL REGION: ICD-10-CM

## 2024-01-10 DIAGNOSIS — M48.02 CERVICAL SPINAL STENOSIS: ICD-10-CM

## 2024-01-10 PROCEDURE — 72141 MRI NECK SPINE W/O DYE: CPT

## 2024-01-26 ENCOUNTER — OFFICE VISIT (OUTPATIENT)
Dept: FAMILY MEDICINE CLINIC | Age: 74
End: 2024-01-26
Payer: MEDICARE

## 2024-01-26 VITALS
SYSTOLIC BLOOD PRESSURE: 112 MMHG | DIASTOLIC BLOOD PRESSURE: 64 MMHG | OXYGEN SATURATION: 94 % | HEART RATE: 68 BPM | BODY MASS INDEX: 23.05 KG/M2 | WEIGHT: 143.4 LBS | TEMPERATURE: 97.5 F | HEIGHT: 66 IN

## 2024-01-26 DIAGNOSIS — M48.02 CERVICAL SPINAL STENOSIS: Primary | ICD-10-CM

## 2024-01-26 DIAGNOSIS — M47.22 OTHER SPONDYLOSIS WITH RADICULOPATHY, CERVICAL REGION: ICD-10-CM

## 2024-01-26 PROCEDURE — 1123F ACP DISCUSS/DSCN MKR DOCD: CPT | Performed by: FAMILY MEDICINE

## 2024-01-26 PROCEDURE — 99213 OFFICE O/P EST LOW 20 MIN: CPT | Performed by: FAMILY MEDICINE

## 2024-01-26 NOTE — PROGRESS NOTES
coordination of care.  I have reviewed the patient's medical history in detail and updated the computerized patient record.    SHAHEEN WOODRUFF, DO

## 2024-02-02 LAB
PROSTATIC SPECIFIC AG: 1.6 NG/ML (ref 0–4)
PSA FREE MFR SERPL: 11.3 %
PSA FREE SERPL-MCNC: 0.2 UG/L

## 2024-02-13 ENCOUNTER — HOSPITAL ENCOUNTER (OUTPATIENT)
Dept: RADIOLOGY | Facility: CLINIC | Age: 74
Discharge: HOME | End: 2024-02-13
Payer: MEDICARE

## 2024-02-13 ENCOUNTER — OFFICE VISIT (OUTPATIENT)
Dept: ORTHOPEDIC SURGERY | Facility: CLINIC | Age: 74
End: 2024-02-13
Payer: MEDICARE

## 2024-02-13 ENCOUNTER — OFFICE VISIT (OUTPATIENT)
Dept: FAMILY MEDICINE CLINIC | Age: 74
End: 2024-02-13
Payer: MEDICARE

## 2024-02-13 VITALS
DIASTOLIC BLOOD PRESSURE: 70 MMHG | HEIGHT: 66 IN | SYSTOLIC BLOOD PRESSURE: 122 MMHG | OXYGEN SATURATION: 97 % | WEIGHT: 144 LBS | BODY MASS INDEX: 23.14 KG/M2 | TEMPERATURE: 67 F

## 2024-02-13 DIAGNOSIS — S46.111A RUPTURE LONG HEAD BICEPS TENDON, RIGHT, INITIAL ENCOUNTER: Primary | ICD-10-CM

## 2024-02-13 DIAGNOSIS — M25.511 RIGHT SHOULDER PAIN, UNSPECIFIED CHRONICITY: ICD-10-CM

## 2024-02-13 DIAGNOSIS — S46.111A RUPTURE LONG HEAD BICEPS TENDON, RIGHT, INITIAL ENCOUNTER: ICD-10-CM

## 2024-02-13 DIAGNOSIS — S46.211A BICEPS MUSCLE TEAR, RIGHT, INITIAL ENCOUNTER: Primary | ICD-10-CM

## 2024-02-13 PROCEDURE — 99214 OFFICE O/P EST MOD 30 MIN: CPT | Mod: 25,27 | Performed by: ORTHOPAEDIC SURGERY

## 2024-02-13 PROCEDURE — 73030 X-RAY EXAM OF SHOULDER: CPT | Mod: RT

## 2024-02-13 PROCEDURE — 99213 OFFICE O/P EST LOW 20 MIN: CPT | Performed by: FAMILY MEDICINE

## 2024-02-13 PROCEDURE — 1159F MED LIST DOCD IN RCRD: CPT | Performed by: ORTHOPAEDIC SURGERY

## 2024-02-13 PROCEDURE — 1123F ACP DISCUSS/DSCN MKR DOCD: CPT | Performed by: FAMILY MEDICINE

## 2024-02-13 PROCEDURE — 1157F ADVNC CARE PLAN IN RCRD: CPT | Performed by: ORTHOPAEDIC SURGERY

## 2024-02-13 PROCEDURE — 99214 OFFICE O/P EST MOD 30 MIN: CPT | Performed by: ORTHOPAEDIC SURGERY

## 2024-02-13 PROCEDURE — 73030 X-RAY EXAM OF SHOULDER: CPT | Mod: RIGHT SIDE | Performed by: ORTHOPAEDIC SURGERY

## 2024-02-13 NOTE — PROGRESS NOTES
Maintenance reviewed.    Objective  Vitals:    02/13/24 0846   BP: 122/70   Temp: (!) 67 °F (19.4 °C)   TempSrc: Temporal   SpO2: 97%   Weight: 65.3 kg (144 lb)   Height: 1.676 m (5' 6\")     BP Readings from Last 3 Encounters:   02/13/24 122/70   01/26/24 112/64   01/02/24 130/72     Wt Readings from Last 3 Encounters:   02/13/24 65.3 kg (144 lb)   01/26/24 65 kg (143 lb 6.4 oz)   01/02/24 64.7 kg (142 lb 9.6 oz)       No results found for: \"LABA1C\"  Lab Results   Component Value Date    CREATININE 0.77 01/02/2024     Lab Results   Component Value Date    ALT 21 01/02/2024    AST 29 01/02/2024     Lab Results   Component Value Date    CHOL 168 01/20/2020    TRIG 56 01/20/2020    HDL 76 (H) 01/20/2020    LDLCALC 81 01/20/2020          Physical Exam  Constitutional:       Appearance: Normal appearance.   Cardiovascular:      Rate and Rhythm: Normal rate and regular rhythm.   Pulmonary:      Effort: Pulmonary effort is normal.      Breath sounds: Normal breath sounds.   Musculoskeletal:      Comments: Appears to have torn long head of biceps tendon.  No swelling or bruising.  Strength intact   Neurological:      General: No focal deficit present.      Mental Status: He is alert.      Comments: Right extremity is neurovascularly intact.       Assessment & Plan   1. Biceps muscle tear, right, initial encounter   He has appt with Ortho already scheduled.  Supportive until   No orders of the defined types were placed in this encounter.    No orders of the defined types were placed in this encounter.    There are no discontinued medications.  Return if symptoms worsen or fail to improve.        Reviewed with the patient: current clinical status, medications, activities and diet.     Side effects, adverse effects of the medication prescribed today, as well as treatment plan/ rationale and result expectations have been discussed with the patient who expresses understanding and desires to proceed.    Close follow up to

## 2024-02-13 NOTE — PROGRESS NOTES
History present illness: Patient presents today for evaluation of the right upper extremity.  He describes acute shoulder pain that occurred after lifting with the right upper extremity.  Details regarding mechanism are unclear.  Injury occurred 2/8/2024.  The patient describes some antecedent shoulder pain that seems to be getting better after the painful pop.  The patient reports that his neck has worsened in the interim since our last visit.  He has a spine specialist that he follows with and an appointment upcoming to discuss.  He presents for evaluation of his right arm/shoulder.      Past medical history: The patient's past medical history, family history, social history, and review of systems were documented on the patient medical intake.  The updated data was reviewed in the electronic medical record.  History is negative except otherwise stated in history of present illness.        Physical examination:  General: Alert and oriented to person, place, and time.  No acute distress and breathing comfortably: Pleasant and cooperative with examination.  HEENT: Head is normocephalic and atraumatic.  Neck: Supple, no visible swelling.  Cardiovascular: No palpable tachycardia  Lungs: No audible wheezing or labored breathing  Abdomen: Nondistended.  Extremities: Evaluation of the right upper extremity finds the patient had palpable radial artery at the wrist with brisk capillary refill to all digits.  Patient has intact sensation to axillary radial median and ulnar nerves.  There are no open wounds.  There are no signs of infection.  There is no evidence of lymphedema or lymphatic streaking.  The patient has supple compartments to right arm forearm and hand.  Physical exam shows obvious evidence for biceps tendon rupture proximally.  He is got nice range of motion to the shoulder.  No ecchymosis or swelling.  Minimal tenderness about the biceps.      Radiology: X-rays of the right shoulder show no acute fracture or  dislocation.  Pronounced AC joint arthritic change.      Assessment: Right shoulder biceps tendon rupture      Plan: Treatment options were discussed.  Recommendations were made for home exercise program for motion preservation and for slow return to activities to tolerance.  He is not having really any shoulder pain today.  He does not have much in the way of achy pain about the biceps.  We talked about the possibility that he could see some very mild weakness to elbow flexion or loss of endurance with repetitive supination exercises but overall he is willing to concede this.  He is interested in potentially discussing this with a shoulder specialist if he is in any way symptomatic in 6 weeks.  We are going to set him up with Dr. Torres.  We encouraged him to call and cancel if his symptoms are improved.  He is not interested in shoulder injection today nor does he want to participate in any type of ongoing therapy.  He seems minimally symptomatic today.  Follow-up with me on an as-needed basis.        Procedure:

## 2024-02-16 ENCOUNTER — TELEPHONE (OUTPATIENT)
Dept: FAMILY MEDICINE CLINIC | Age: 74
End: 2024-02-16

## 2024-02-16 NOTE — TELEPHONE ENCOUNTER
Patient calling in just to inform  he missed his VV appointment with Neurosurgeon couldn't connect with them on video 2/15/24 so rescheduled appointment for 3/20/24 in office.    Patient also wants you to know he saw his shoulder  and he told him just to leave arm alone its not worth it.    Patient said if any questions give him a call [phone]

## 2024-02-28 ENCOUNTER — APPOINTMENT (OUTPATIENT)
Dept: ORTHOPEDIC SURGERY | Facility: CLINIC | Age: 74
End: 2024-02-28
Payer: MEDICARE

## 2024-03-01 ENCOUNTER — OFFICE VISIT (OUTPATIENT)
Dept: ORTHOPEDIC SURGERY | Facility: CLINIC | Age: 74
End: 2024-03-01
Payer: MEDICARE

## 2024-03-01 DIAGNOSIS — S46.211S RUPTURE OF RIGHT PROXIMAL BICEPS TENDON, SEQUELA: Primary | ICD-10-CM

## 2024-03-01 PROCEDURE — 1157F ADVNC CARE PLAN IN RCRD: CPT | Performed by: STUDENT IN AN ORGANIZED HEALTH CARE EDUCATION/TRAINING PROGRAM

## 2024-03-01 PROCEDURE — 99213 OFFICE O/P EST LOW 20 MIN: CPT | Performed by: STUDENT IN AN ORGANIZED HEALTH CARE EDUCATION/TRAINING PROGRAM

## 2024-03-01 PROCEDURE — 1159F MED LIST DOCD IN RCRD: CPT | Performed by: STUDENT IN AN ORGANIZED HEALTH CARE EDUCATION/TRAINING PROGRAM

## 2024-03-01 NOTE — PROGRESS NOTES
Chief Complaint   Patient presents with    Right Upper Arm - Pain     Bicep tendon rupture  Referred by Dr. Nathan       HPI  Patient presents today for follow up of right shoulder.  Patient with history of proximal biceps tendon tear treated conservatively.  Patient states date of injury was 2/8/2024 for which he subsequently felt a pop and pain in his biceps region.  This has since improved.  Some occasional residual weakness however he has returned to full activities with no restrictions.  Not really any pain at all today      Physical exam  General: Alert and oriented to place, person, and time.  No acute distress and breathing comfortably; pleasant and cooperative with the examination.  Extremity:  Focused examination right shoulder: Overlying skin is clean dry intact normal muscular contour about the shoulder no appreciable atrophy.  Full range of motion of shoulder without any crepitus.  5 out of 5 rotator cuff strength.  Positive Javi deformity about the biceps with low hanging muscle belly.  Mild tenderness palpation about the biceps.  Negative hook test.  Sensation intact light touch about the median radial and ulnar nerve distribution hand is warm and well-perfused.  5 out of 5 rotator cuff strength.    Diagnostics:  XR shoulder right 2+ views    Result Date: 2/13/2024  Interpreted By:  Shaheed Nathan, STUDY: XR SHOULDER RIGHT 2+ VIEWS;  ;  2/13/2024 10:48 am   INDICATION: Signs/Symptoms:pain, injury.   ACCESSION NUMBER(S): [id number]   ORDERING CLINICIAN: SHAHEED NATHAN   FINDINGS: X-rays of the right shoulder show no acute fracture or dislocation. Advanced arthrosis of acromioclavicular joint.     Signed by: Shaheed Nathan 2/13/2024 11:46 AM Dictation workstation:   [id number]       Procedures  Procedures     Assessment:  73-year-old male with proximal biceps tendon rupture    Treatment plan:  Will continue with conservative treatment  Discussed that surgical intervention may improve cosmesis  however there are no guarantees with this patient not interested in any further type of treatment regarding his right shoulder at this point in time.  Discussed importance of range of motion as tolerated activities as tolerated using pain as a guide  Follow-up as needed  All of the patient's questions concerns answered

## 2024-03-08 ENCOUNTER — APPOINTMENT (OUTPATIENT)
Dept: PHYSICAL THERAPY | Facility: CLINIC | Age: 74
End: 2024-03-08
Payer: MEDICARE

## 2024-03-13 ENCOUNTER — APPOINTMENT (OUTPATIENT)
Dept: ORTHOPEDIC SURGERY | Facility: CLINIC | Age: 74
End: 2024-03-13
Payer: MEDICARE

## 2024-03-26 ENCOUNTER — APPOINTMENT (OUTPATIENT)
Dept: ORTHOPEDIC SURGERY | Facility: CLINIC | Age: 74
End: 2024-03-26
Payer: MEDICARE

## 2024-03-28 ENCOUNTER — TELEPHONE (OUTPATIENT)
Dept: FAMILY MEDICINE CLINIC | Age: 74
End: 2024-03-28

## 2024-03-28 NOTE — TELEPHONE ENCOUNTER
Patient called to let you know that he had a an appt on 3/20 with the Neurosurgeon. He tried to drive to his appt but his neck and arm were so bad that he had to pull over to the side of the road.     They were able to do a vv with him and was told that his C3 & 4 are ruptured.    He is going to have surgery but he is waiting for them to contact him to schedule the consultation. He just wanted to keep you updated.    Thank you.

## 2024-04-19 ENCOUNTER — OFFICE VISIT (OUTPATIENT)
Dept: FAMILY MEDICINE CLINIC | Age: 74
End: 2024-04-19
Payer: MEDICARE

## 2024-04-19 VITALS
OXYGEN SATURATION: 96 % | TEMPERATURE: 97.2 F | DIASTOLIC BLOOD PRESSURE: 70 MMHG | BODY MASS INDEX: 23.27 KG/M2 | SYSTOLIC BLOOD PRESSURE: 134 MMHG | WEIGHT: 144.8 LBS | HEART RATE: 79 BPM | HEIGHT: 66 IN

## 2024-04-19 DIAGNOSIS — M48.02 CERVICAL SPINAL STENOSIS: ICD-10-CM

## 2024-04-19 DIAGNOSIS — M47.22 OTHER SPONDYLOSIS WITH RADICULOPATHY, CERVICAL REGION: ICD-10-CM

## 2024-04-19 DIAGNOSIS — Z01.818 PREOP GENERAL PHYSICAL EXAM: ICD-10-CM

## 2024-04-19 DIAGNOSIS — Z01.818 PREOP GENERAL PHYSICAL EXAM: Primary | ICD-10-CM

## 2024-04-19 LAB
ANION GAP SERPL CALCULATED.3IONS-SCNC: 8 MEQ/L (ref 9–15)
APTT PPP: 29.6 SEC (ref 24.4–36.8)
BASOPHILS # BLD: 0 K/UL (ref 0–0.2)
BASOPHILS NFR BLD: 0.4 %
BUN SERPL-MCNC: 15 MG/DL (ref 8–23)
CALCIUM SERPL-MCNC: 8.5 MG/DL (ref 8.5–9.9)
CHLORIDE SERPL-SCNC: 103 MEQ/L (ref 95–107)
CO2 SERPL-SCNC: 26 MEQ/L (ref 20–31)
CREAT SERPL-MCNC: 0.88 MG/DL (ref 0.7–1.2)
EOSINOPHIL # BLD: 0.4 K/UL (ref 0–0.7)
EOSINOPHIL NFR BLD: 6.1 %
ERYTHROCYTE [DISTWIDTH] IN BLOOD BY AUTOMATED COUNT: 12.5 % (ref 11.5–14.5)
GLUCOSE SERPL-MCNC: 92 MG/DL (ref 70–99)
HCT VFR BLD AUTO: 44.7 % (ref 42–52)
HGB BLD-MCNC: 15.1 G/DL (ref 14–18)
INR PPP: 1
LYMPHOCYTES # BLD: 1.9 K/UL (ref 1–4.8)
LYMPHOCYTES NFR BLD: 26.9 %
MCH RBC QN AUTO: 32.3 PG (ref 27–31.3)
MCHC RBC AUTO-ENTMCNC: 33.8 % (ref 33–37)
MCV RBC AUTO: 95.7 FL (ref 79–92.2)
MONOCYTES # BLD: 0.6 K/UL (ref 0.2–0.8)
MONOCYTES NFR BLD: 9.1 %
NEUTROPHILS # BLD: 4 K/UL (ref 1.4–6.5)
NEUTS SEG NFR BLD: 57.2 %
PLATELET # BLD AUTO: 326 K/UL (ref 130–400)
POTASSIUM SERPL-SCNC: 4.6 MEQ/L (ref 3.4–4.9)
PROTHROMBIN TIME: 13.7 SEC (ref 12.3–14.9)
RBC # BLD AUTO: 4.67 M/UL (ref 4.7–6.1)
SODIUM SERPL-SCNC: 137 MEQ/L (ref 135–144)
WBC # BLD AUTO: 7.1 K/UL (ref 4.8–10.8)

## 2024-04-19 PROCEDURE — 93000 ELECTROCARDIOGRAM COMPLETE: CPT | Performed by: FAMILY MEDICINE

## 2024-04-19 PROCEDURE — 1123F ACP DISCUSS/DSCN MKR DOCD: CPT | Performed by: FAMILY MEDICINE

## 2024-04-19 PROCEDURE — 99214 OFFICE O/P EST MOD 30 MIN: CPT | Performed by: FAMILY MEDICINE

## 2024-04-19 NOTE — PROGRESS NOTES
PMH, Surgical Hx, Family Hx, and Social Hx reviewed and updated.  Health Maintenance reviewed.    Objective  Vitals:    04/19/24 0942   BP: 134/70   Pulse: 79   Temp: 97.2 °F (36.2 °C)   TempSrc: Temporal   SpO2: 96%   Weight: 65.7 kg (144 lb 12.8 oz)   Height: 1.676 m (5' 6\")     BP Readings from Last 3 Encounters:   04/19/24 134/70   02/13/24 122/70   01/26/24 112/64     Wt Readings from Last 3 Encounters:   04/19/24 65.7 kg (144 lb 12.8 oz)   02/13/24 65.3 kg (144 lb)   01/26/24 65 kg (143 lb 6.4 oz)       No results found for: \"LABA1C\"  Lab Results   Component Value Date    CREATININE 0.77 01/02/2024     Lab Results   Component Value Date    ALT 21 01/02/2024    AST 29 01/02/2024     Lab Results   Component Value Date    CHOL 168 01/20/2020    TRIG 56 01/20/2020    HDL 76 (H) 01/20/2020    LDLCALC 81 01/20/2020          Physical Exam  Vitals reviewed.   Constitutional:       Appearance: Normal appearance. He is normal weight.   HENT:      Head: Normocephalic and atraumatic.      Right Ear: Tympanic membrane normal.      Left Ear: Tympanic membrane normal.      Nose: Nose normal.      Mouth/Throat:      Mouth: Mucous membranes are dry.   Eyes:      Extraocular Movements: Extraocular movements intact.      Conjunctiva/sclera: Conjunctivae normal.      Pupils: Pupils are equal, round, and reactive to light.   Cardiovascular:      Rate and Rhythm: Normal rate and regular rhythm.      Pulses: Normal pulses.      Heart sounds: Normal heart sounds.   Pulmonary:      Effort: Pulmonary effort is normal.      Breath sounds: Normal breath sounds.   Abdominal:      General: Abdomen is flat. Bowel sounds are normal.      Palpations: Abdomen is soft.   Musculoskeletal:         General: Swelling, tenderness (marked straightening of cervical lordosis.  Limited in all planes of motion in the neck) and deformity present. Normal range of motion.      Cervical back: Normal range of motion and neck supple.   Skin:     General:

## 2024-04-30 ENCOUNTER — HOSPITAL ENCOUNTER (OUTPATIENT)
Dept: PHYSICAL THERAPY | Age: 74
Setting detail: THERAPIES SERIES
Discharge: HOME | End: 2024-04-30
Attending: FAMILY MEDICINE
Payer: MEDICARE

## 2024-04-30 PROCEDURE — 97163 PT EVAL HIGH COMPLEX 45 MIN: CPT

## 2024-04-30 ASSESSMENT — PAIN DESCRIPTION - ORIENTATION: ORIENTATION: RIGHT

## 2024-04-30 ASSESSMENT — PAIN DESCRIPTION - DESCRIPTORS: DESCRIPTORS: ACHING;PINS AND NEEDLES

## 2024-04-30 ASSESSMENT — PAIN SCALES - GENERAL: PAINLEVEL_OUTOF10: 9

## 2024-04-30 ASSESSMENT — PAIN DESCRIPTION - PAIN TYPE: TYPE: CHRONIC PAIN

## 2024-05-08 ENCOUNTER — TELEPHONE (OUTPATIENT)
Dept: FAMILY MEDICINE CLINIC | Age: 74
End: 2024-05-08

## 2024-05-08 NOTE — TELEPHONE ENCOUNTER
[employer] Calling    - craig procedure got denied corpectomy due to not meeting medical necessity so if there are any medical records that can help this appeal to please be faxed within the next 24 hours.    Fax number [phone]

## 2024-05-08 NOTE — TELEPHONE ENCOUNTER
Patient wanted to inform us we will be receiving a call from [employer].     Patient needs Dr Lilly to help with an appeal and the appeal people from [employer] will be calling Dr Lilly.     Denied surgery so this appeal will help him get coverage for surgery. He is trying to get the appeal within 72 hours.

## 2024-05-10 NOTE — TELEPHONE ENCOUNTER
Pt. called in to advise his appeal went through, and he is scheduled for surgery May 16th. He says if you have any questions call him on his cell phone [phone].

## 2024-05-17 ENCOUNTER — TELEPHONE (OUTPATIENT)
Dept: FAMILY MEDICINE CLINIC | Age: 74
End: 2024-05-17

## 2024-05-17 NOTE — TELEPHONE ENCOUNTER
Patient called and stated that he had surgery on C-3 and C4 discs.  He is having issues with his right arm.  He stated the surgeon stated that it may take a couple of days to get full movement.      He stated that if Dr. Lilly wants to follow up with him, please give him a call.

## 2024-05-20 ENCOUNTER — OFFICE VISIT (OUTPATIENT)
Dept: FAMILY MEDICINE CLINIC | Age: 74
End: 2024-05-20
Payer: MEDICARE

## 2024-05-20 VITALS
BODY MASS INDEX: 21.92 KG/M2 | TEMPERATURE: 97.5 F | HEART RATE: 87 BPM | OXYGEN SATURATION: 94 % | HEIGHT: 66 IN | WEIGHT: 136.4 LBS | SYSTOLIC BLOOD PRESSURE: 106 MMHG | DIASTOLIC BLOOD PRESSURE: 66 MMHG

## 2024-05-20 DIAGNOSIS — M47.22 OTHER SPONDYLOSIS WITH RADICULOPATHY, CERVICAL REGION: ICD-10-CM

## 2024-05-20 DIAGNOSIS — Z09 HOSPITAL DISCHARGE FOLLOW-UP: Primary | ICD-10-CM

## 2024-05-20 PROCEDURE — 1123F ACP DISCUSS/DSCN MKR DOCD: CPT | Performed by: FAMILY MEDICINE

## 2024-05-20 PROCEDURE — 99213 OFFICE O/P EST LOW 20 MIN: CPT | Performed by: FAMILY MEDICINE

## 2024-05-20 PROCEDURE — 1111F DSCHRG MED/CURRENT MED MERGE: CPT | Performed by: FAMILY MEDICINE

## 2024-05-20 RX ORDER — METHYLPREDNISOLONE 4 MG/1
TABLET ORAL
COMMUNITY
Start: 2024-05-17

## 2024-05-20 RX ORDER — OXYCODONE HYDROCHLORIDE AND ACETAMINOPHEN 5; 325 MG/1; MG/1
TABLET ORAL
COMMUNITY
Start: 2024-05-16

## 2024-05-20 NOTE — PROGRESS NOTES
Subjective  Eh Bales 1950 is a 73 y.o. male who presents today with:  Chief Complaint   Patient presents with    Follow-Up from Hospital     Patient underwent RIGHT C3-4, C4-5 ENDOSCOPIC ASSISTED ANTERIOR FORAMINOTOMY AND SPINAL CORD DECOMPRESSION WITH NEUROMONITORING with Dr. Dwyer on 5/16/24. He was given Medrol and Percocet following his discharge. He has stopped both medications as he has broken out in a rash. States he has lost some ROM of his right arm. States the front and back of his shoulder feel numb. The neck pain is almost entirely resolved. He is not yet scheduled to follow up with his surgeon.        HPI  I have reviewed HPI and agree with above. POD 4.  Some swelling under incision.  Feels some improvement.  Cannot lift right arm.  No f/c.  Bowels are improving.  Appetite is getting better.  No smoking.    Review of Systems   All other systems reviewed and are negative.      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    SPINE SURGERY  1994, 1997, 2011    [facility], Dr Hutton ([facility])    SPINE SURGERY      March 2018, May 2019, [facility]      Social History

## 2024-05-28 ENCOUNTER — OFFICE VISIT (OUTPATIENT)
Dept: FAMILY MEDICINE CLINIC | Age: 74
End: 2024-05-28

## 2024-05-28 VITALS
BODY MASS INDEX: 21.86 KG/M2 | TEMPERATURE: 97.4 F | WEIGHT: 136 LBS | OXYGEN SATURATION: 96 % | HEART RATE: 86 BPM | HEIGHT: 66 IN

## 2024-05-28 DIAGNOSIS — M25.611 DECREASED SHOULDER MOBILITY, RIGHT: ICD-10-CM

## 2024-05-28 DIAGNOSIS — R29.898 WEAKNESS OF RIGHT SHOULDER: ICD-10-CM

## 2024-05-28 DIAGNOSIS — M48.02 CERVICAL SPINAL STENOSIS: ICD-10-CM

## 2024-05-28 DIAGNOSIS — M47.22 OTHER SPONDYLOSIS WITH RADICULOPATHY, CERVICAL REGION: Primary | ICD-10-CM

## 2024-05-28 NOTE — PROGRESS NOTES
Subjective  Eh Bales 1950 is a 73 y.o. male who presents today with:  Chief Complaint   Patient presents with    Follow-up     1 week follow up. The swelling in his neck appears to have gone down since his last visit, but has not resolved entirely. C/O halo headaches. States he has a lot of neck pain that radiates into his upper back and shoulders. He is not scheduled for a follow up with his surgeon or physical therapy following his surgery.        HPI  I have reviewed HPI and agree with above. HE does not have much use of right shoulder    Review of Systems   All other systems reviewed and are negative.      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    SPINE SURGERY  1994, 1997, 2011    [facility], Dr Hutton ([facility])    SPINE SURGERY      March 2018, May 2019, [facility]      Social History     Socioeconomic History    Marital status:      Spouse name: Not on file    Number of children: 1    Years of education: Not on file    Highest education level: Not on file   Occupational History    Occupation: retired [employer]   Tobacco Use    Smoking status: Some Days

## 2024-05-29 ENCOUNTER — TELEPHONE (OUTPATIENT)
Dept: FAMILY MEDICINE CLINIC | Age: 74
End: 2024-05-29

## 2024-05-29 NOTE — TELEPHONE ENCOUNTER
Patient was just here yesterday (5/28/24)    Patient is requesting an MRI order for his right shoulder (saying he has a torn rotator cuff), would like to get this taken care of asap    Patient phone [phone]

## 2024-06-03 ENCOUNTER — HOSPITAL ENCOUNTER (OUTPATIENT)
Dept: PHYSICAL THERAPY | Age: 74
Setting detail: THERAPIES SERIES
Discharge: HOME | End: 2024-06-03
Attending: FAMILY MEDICINE
Payer: MEDICARE

## 2024-06-03 PROCEDURE — 97162 PT EVAL MOD COMPLEX 30 MIN: CPT

## 2024-06-03 ASSESSMENT — PAIN SCALES - GENERAL: PAINLEVEL_OUTOF10: 9

## 2024-06-03 ASSESSMENT — PAIN DESCRIPTION - ORIENTATION: ORIENTATION: RIGHT

## 2024-06-03 ASSESSMENT — PAIN DESCRIPTION - DESCRIPTORS: DESCRIPTORS: NUMBNESS

## 2024-06-03 NOTE — PROGRESS NOTES
[address] [place] [address]  Phone: [phone]                             Physical Therapy: Initial Evaluation    Patient: Eh Bales (73 y.o.     male)   Examination Date: 2024   :  1950 ;    Confirmed: Yes MRN: [id number]  CSN: [id number]   Insurance: Payor: [facility] MEDICARE / Plan: AET MEDICARE-ADVANTAGE PPO / Product Type: Medicare /   Insurance ID: [id number] - (Medicare Managed)  PT Insurance Information: Aetna Medicare Secondary Insurance (if applicable):     Referring Physician: Jhony Lilly DO       Visits to Date/Visits Approved:  (BMN)    No Show/Cancelled Appts:      Medical Diagnosis: Decreased shoulder mobility, right [M25.611]  Weakness of right shoulder [R29.898]        Treatment Diagnosis: neck pain, R UE pain and numbness, decreased neck ROM, decreased R shoulder ROM, decreased R UE strength, impaired ability to reach, lift, and carry with R UE     PERTINENT MEDICAL HISTORY   Patient Assessed for Rehabilitation Services: Yes       Medical History: Chart Reviewed: Yes   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     right index    Foot drop, left foot     H/O cervical spine surgery     minimal invasive surgery, [facility]    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

## 2024-06-03 NOTE — PLAN OF CARE
[address] [facility] [place] [address]  Phone: [phone]    [x] Certification  [] Recertification [x]  Plan of Care  [] Progress Note [] Discharge      Referring Provider: Jhony Lilly DO     From:  Fady Noriega, PT, DPT  Patient: Eh Bales (73 y.o. male) : 1950 Date: 6/3/2024  Medical Diagnosis: Decreased shoulder mobility, right [M25.611]  Weakness of right shoulder [R29.898]       Treatment Diagnosis: neck pain, R UE pain and numbness, decreased neck ROM, decreased R shoulder ROM, decreased R UE strength, impaired ability to reach, lift, and carry with R UE    Plan of Care/Certification Expiration Date: 24   Progress Report Period from:  6/3/2024  to 6/3/2024    Visits to Date: 1 No Show: 0 Cancelled Appts: 0    OBJECTIVE:   Long Term Goals - Time Frame for Long Term Goals : 4-6 weeks  Goals Current/ Discharge status Status   Long Term Goal 1: Pt will demo >/= 90 deg R shoulder AROM flexion to improve reaching away from his body for completion of ADLs.     AROM LUE (degrees)  L Shoulder Flexion (0-180): 105  L Shoulder ABduction (0-180): 90  L Shoulder Int Rotation  (0-70): small of back   AROM RUE (degrees)  R Shoulder Flexion (0-180): 10  R Shoulder ABduction (0-180): 10  R Shoulder Int Rotation  (0-70): sacrum  R Shoulder Ext Rotation (0-90): 0          PROM RUE (degrees)  R Shoulder Flex  (0-180): 90  R Shoulder Ext  (0-45): 30  R Shoulder ABduction (0-180): 90  AROM Cervical Spine   Cervical Spine AROM : Painful  Measured as: Degrees  Cervical flexion: 22  Cervical extension: 15  Cervical right rotation: 20  Cervical left rotation: 40                   New   Long Term Goal 2: Pt will demo at least 1/2 MMT grade improvement in R shoulder and elbow to improve his functional reaching and lifting tolerance.     Strength RUE  R Shoulder Flexion: 1+/5  R Shoulder Extension: 2+/5  R Shoulder ABduction: 1+/5  R Shoulder

## 2024-06-05 ENCOUNTER — TELEPHONE (OUTPATIENT)
Dept: FAMILY MEDICINE CLINIC | Age: 74
End: 2024-06-05

## 2024-06-05 DIAGNOSIS — M75.121 NONTRAUMATIC COMPLETE TEAR OF RIGHT ROTATOR CUFF: Primary | ICD-10-CM

## 2024-06-05 DIAGNOSIS — R29.898 WEAKNESS OF RIGHT SHOULDER: ICD-10-CM

## 2024-06-05 DIAGNOSIS — M25.611 DECREASED SHOULDER MOBILITY, RIGHT: ICD-10-CM

## 2024-06-05 NOTE — TELEPHONE ENCOUNTER
Patient stated he went to PT yesterday and he is asking that if Dr. Lilly went over the report?  He stated he has a ripped rotator cuff.  He needs to get a MRI ordered.

## 2024-06-14 ENCOUNTER — HOSPITAL ENCOUNTER (OUTPATIENT)
Dept: MRI IMAGING | Age: 74
End: 2024-06-14
Payer: MEDICARE

## 2024-06-14 DIAGNOSIS — M75.121 NONTRAUMATIC COMPLETE TEAR OF RIGHT ROTATOR CUFF: ICD-10-CM

## 2024-06-14 DIAGNOSIS — M25.611 DECREASED SHOULDER MOBILITY, RIGHT: ICD-10-CM

## 2024-06-14 DIAGNOSIS — R29.898 WEAKNESS OF RIGHT SHOULDER: ICD-10-CM

## 2024-06-14 PROCEDURE — 73221 MRI JOINT UPR EXTREM W/O DYE: CPT

## 2024-06-18 DIAGNOSIS — M75.121 NONTRAUMATIC COMPLETE TEAR OF RIGHT ROTATOR CUFF: Primary | ICD-10-CM

## 2024-06-26 ENCOUNTER — OFFICE VISIT (OUTPATIENT)
Dept: FAMILY MEDICINE CLINIC | Age: 74
End: 2024-06-26
Payer: MEDICARE

## 2024-06-26 VITALS
HEIGHT: 66 IN | BODY MASS INDEX: 21.86 KG/M2 | SYSTOLIC BLOOD PRESSURE: 134 MMHG | OXYGEN SATURATION: 95 % | TEMPERATURE: 98 F | HEART RATE: 87 BPM | DIASTOLIC BLOOD PRESSURE: 72 MMHG | WEIGHT: 136 LBS

## 2024-06-26 DIAGNOSIS — M75.121 NONTRAUMATIC COMPLETE TEAR OF RIGHT ROTATOR CUFF: Primary | ICD-10-CM

## 2024-06-26 PROCEDURE — 99213 OFFICE O/P EST LOW 20 MIN: CPT | Performed by: FAMILY MEDICINE

## 2024-06-26 PROCEDURE — 1123F ACP DISCUSS/DSCN MKR DOCD: CPT | Performed by: FAMILY MEDICINE

## 2024-06-26 RX ORDER — OXYCODONE HYDROCHLORIDE AND ACETAMINOPHEN 5; 325 MG/1; MG/1
1 TABLET ORAL EVERY 6 HOURS PRN
Qty: 28 TABLET | Refills: 0 | Status: SHIPPED | OUTPATIENT
Start: 2024-06-26 | End: 2024-07-03

## 2024-06-26 NOTE — PROGRESS NOTES
Subjective  Eh Bales 1950 is a 73 y.o. male who presents today with:  Chief Complaint   Patient presents with    1 Month Follow-Up     States he continues to have neck pain. Does not feel like this is improved at all following his surgery due to his shoulder pain. He is scheduled to see Dr. Jones on July 3rd in regards to his shoulder. States he is mentally ill due to his chronic pain.       HPI  I have reviewed HPI and agree with above. He had normal ROM before his surgery.  After surgery has not been able move right arm.  MRI shows complete RC tear of supraspinatus.  Some damage to posterior RC muscles.    Controlled substances monitoring: possible medication side effects, risk of tolerance and/or dependence, and alternative treatments discussed, no signs of potential drug abuse or diversion identified, and OARRS report reviewed today- activity consistent with treatment plan.      Review of Systems   All other systems reviewed and are negative.      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 surgery    History of depression 2004    due to work accident    History of epididymitis 2011    Hx of cervical spine surgery 2019    in [facility], Dr Blair benjy, cervical stenosis reduced to mild    Hx of neck injury 2001    pain management Dr Anthony [facility]    Osteoarthrosis of hand     S/P colonoscopy with polypectomy 2010, 2015    Dr Luu, Dr Hawley    S/P spinal surgery 04/2011    [facility]    Smoking     Solitary pulmonary nodule on lung CT 2020    right, Dr Ragland     Past Surgical History:   Procedure Laterality Date    BACK SURGERY      COLONOSCOPY  2/17/2015    BIBI SEBASTIAN M.D.    SPINE SURGERY  1994, 1997, 2011    [facility], Dr Hutton ([facility])    SPINE SURGERY

## 2024-07-03 ENCOUNTER — OFFICE VISIT (OUTPATIENT)
Dept: ORTHOPEDIC SURGERY | Facility: CLINIC | Age: 74
End: 2024-07-03
Payer: MEDICARE

## 2024-07-03 DIAGNOSIS — S46.011A TRAUMATIC COMPLETE TEAR OF RIGHT ROTATOR CUFF, INITIAL ENCOUNTER: Primary | ICD-10-CM

## 2024-07-03 DIAGNOSIS — M25.511 RIGHT SHOULDER PAIN, UNSPECIFIED CHRONICITY: ICD-10-CM

## 2024-07-03 DIAGNOSIS — M54.12 CERVICAL RADICULOPATHY: ICD-10-CM

## 2024-07-03 PROCEDURE — 1157F ADVNC CARE PLAN IN RCRD: CPT | Performed by: ORTHOPAEDIC SURGERY

## 2024-07-03 PROCEDURE — 99214 OFFICE O/P EST MOD 30 MIN: CPT | Performed by: ORTHOPAEDIC SURGERY

## 2024-07-03 NOTE — PROGRESS NOTES
History: Tom is here for his right shoulder.  He has a long history of right shoulder issues including a prior tear 10 years ago.  He states that healed well.  He had a biceps tendon rupture earlier this year and saw Dr. Cummings.  He was noted to have good shoulder function and strength at that time.  He subsequently had a cervical spine surgery in [place] and send when he awoke he could no longer move his arm.  He has had persistent pain and dysfunction ever since  He obtained a new MRI of the shoulder and is here today for follow-up..    Past medical history: Multiple  Medications: Multiple  Allergies: No known drug allergies    Please refer to the intake H&P regarding the patient's review of systems, family history and social history as was done today    HEENT: Normal  Lungs: Clear to auscultation  Heart: RRR  Abdomen: Soft, nontender  Skin: clear  Extremity: He has limited abduction of the right shoulder to 30 degrees.  Forward flexion is the same.  He has essentially no strength in any resisted maneuvers.  No obvious atrophy in the shoulder musculature.  There is some atrophy to the right neck and shoulder blade musculature.  No numbness today.  Very limited neck extension and rotation.  Contralateral exam is normal for strength, motion, stability and neurovascular assessment.    Radiographs: MRI from [facility] shows a full-thickness supraspinatus tear of at least 2 to 3 cm.  There is mild mild atrophy as well.  Biceps tendon is ruptured.    Assessment: Large full-thickness right rotator cuff tear with atrophy status post recent cervical surgery    Plan: He is concerned that the neck surgery caused this tear although it is more likely that he had a prior tear which has now gotten larger.  He had full function however of the shoulder several months ago.  At this point I recommended an EMG to rule out any neurologic cause for his shoulder dysfunction.  It is unclear if rotator cuff surgery will be possible  given his atrophy and retraction and he may require reverse replacement however if there is nerve damage this would also be contraindicated.  He does have some decent tendon remaining however on personal review of MRI.  We can see him back to go through results.  He can work on gentle range of motion and ice daily.  All questions were answered today with the patient.    This note was generated with voice recognition software and may contain grammatical errors.

## 2024-07-09 ENCOUNTER — TELEPHONE (OUTPATIENT)
Dept: FAMILY MEDICINE CLINIC | Age: 74
End: 2024-07-09

## 2024-07-09 NOTE — TELEPHONE ENCOUNTER
[facility] is calling, they need the order for Eh's EMG faxed over. They would also need the ICD-10 code.    Please fax to [phone]    Please call the office once it is sent and the fax says \"successful\"   Phone number [phone] ask of Dana

## 2024-07-16 NOTE — TELEPHONE ENCOUNTER
Called and spoke to patient, he stated he called the wrong office. He has the order now from another physician.

## 2024-07-29 ENCOUNTER — HOSPITAL ENCOUNTER (OUTPATIENT)
Dept: NEUROLOGY | Facility: HOSPITAL | Age: 74
Discharge: HOME | End: 2024-07-29
Payer: MEDICARE

## 2024-07-29 DIAGNOSIS — M54.12 CERVICAL RADICULOPATHY: ICD-10-CM

## 2024-07-29 PROCEDURE — 95913 NRV CNDJ TEST 13/> STUDIES: CPT

## 2024-07-29 NOTE — LETTER
2024     Osmel Kendall, DO  [address] [facility] [address]    Patient: Tom Pierre   YOB: 1950   Date of Visit: 2024       Dear Dr. Kendall:    EMG+NCV ordered by Dr. LINH Clayton was done today.       Regards,     Tristen Warner      ______________________________________________________________________________________          Electromyography Laboratory Report        Accreditation with Exemplary Status       Name TOM PIERRE MRN [id number] Ref Provider CHELLE CLAYTON Technologist JOZEF Awad    Gender Male Age 73 Years EDX Physician Tristen Warner MD  Temp ºC 35    1950 Height 5 feet 7 inch Order No [id number] Study Date 2024 9:58 AM      Reason for Referral:   Cervical Radiculopathy,         Motor Nerve Conduction Study                   Nerve/Sites Muscle Amplitude Latency Velocity F min       mV ms m/s ms       Right Left Ref. Right Left Ref. Right Left Ref. Right Left Ref.   Median - APB      Wrist APB 4.4 7.3 >=5.0 4.0 3.9 <=4.0       25.9 29.1 <=31.5      AC Fossa APB 4.3 6.6   7.9 8.1   59.7 49.7 >=50.0         Ulnar - ADM      Wrist ADM 10.2 7.7 >=7.0 3.0 2.4 <=3.1       27.4 26.9 <=30.9      B. Elbow ADM 9.4 6.3   5.7 5.4   57.2 57.9 >=50.0            A. Elbow ADM 8.8 7.3   7.9 7.4   46.6 50.0 >=50.0             Sensory Nerve Conduction Study                Nerve/Sites Rec. Site Amplitude Peak Lat Velocity       µV ms m/s       Right Left Ref. Right Left Ref. Right Left Ref.   Median      Wrist Index 0.21 11.3 >=10.0 4.1 4.1 <=3.8 44.4 52.6 >=50.0   Ulnar      Wrist Little 5.2 10.2 >=5.0 3.6 3.2 <=3.2 51.6 52.9 >=50.0   Radial      Forearm Snuff Box 23.1 24.0 >=10.0 2.7 2.7 <=2.7 64.0 59.1 >=50.0   Median, Ulnar - Palmar Mixed      Median  Palm Med Wr 15.0 19.8   2.6 2.4 <=2.2 39.4 42.7 >=49.0      Ulnar Palm Uln Wr 12.7 20.2   2.2 2.1 <=2.2 56.9 48.0 >=49.0                                                     EMG Summary Table       Spontaneous Voluntary Activity    Muscle Nerve Roots Ins Act Fibs/PSW Fasc Other Amp Dur Phases Recruitment Activation Notes   R. APB - Abd Poll Brev Median C8-T1 NL 0 0 - -1 +1 NL NL NL -   R. PT - Pron Teres Median C6-C7 NL 0 0 - NL NL NL NL Fair -   R. FDI - First Dors Int Ulnar C8-T1 NL 0 0 - NL +1 NL NL NL -   R. FCU - Flex Carp Uln Ulnar C7-T1 NL 0 0 - NL NL NL SL NL -   R. EIP - Ext Ind Prop Radial C7-C8 NL 0 0 - NL NL NL NL NL -   R. Brachioradialis Radial C5-C6 NL 0 0 - NL NL NL NL NL -   R. TB - Triceps Brach Radial C6-C8 NL 0 0 - NL NL NL NL Fair -   R. BB - Biceps Brach Musculocutaneous C5-C6 NL +4 0 - -1 +3 NL MK Poor -   R. MD - Deltoid (med) Axillary C5-C6 NL 0 0 - -2 +2 N/+1 MO Poor -   R. SS - Supraspinatus Suprascapular C5-C6 NL +3 0 - NL +2 NL MO Poor atrophy   R. IS - Infraspinatus Suprascapular C5-C6 NL +3 0 - NL NL NL NL Poor -   R. Cervical psp (low)   - NL 0 0 - NL NL NL NL NL -   R. Cervical psp (mid)   - NL 0 0 - NL NL NL NL NL -   R. Cervical psp (up)   - NL 0 0 - NL NL NL NL NL -   R. Pect major Medial pectoral C5-C8 NL 0 0 - NL NL NL NL NL -   L. APB - Abd Poll Brev Median C8-T1 NL 0 0 - NL NL NL SL NL -   L. PT - Pron Teres Median C6-C7 NL 0 0 - NL NL NL SL NL -   L. FDI - First Dors Int Ulnar C8-T1 NL 0 0 - NL NL NL NL NL -   L. FCU - Flex Carp Uln Ulnar C7-T1 NL 0 0 - NL NL NL NL NL -   L. TB - Triceps Brach Radial C6-C8 NL 0 0 - NL NL NL NL NL -   L. BB - Biceps Brach Musculocutaneous C5-C6 NL 0 0 - NL NL NL NL NL -   L. MD - Deltoid (med) Axillary C5-C6 NL 0 0 - NL NL NL NL NL -   L. Cervical psp (low)   - NL 0 0 - NL NL NL NL NL -   L. Cervical psp (mid)   - NL 0 0 - NL NL NL NL NL -   L. Cervical psp (up)   - NL 0 0 - NL NL NL NL NL -        NL = Normal, Inc = Increased, Dec = Decreased, CRD = Complex Repetitive Discharge; SL = Slightly Decreased; MO = Moderately Decreased; MK = Markedly Decreased; N/+1, +1, +2, +3 = Borderline, Slightly, Moderately, Markedly Increased; N/-1, -1, -2, -3 = Borderline, Slightly,  Moderately, Markedly Decreased, N/A = Not Applicable     Summary:      Impression:                This study reveals ENMG evidence of n acute, neuropathic, axonal process affecting the upper trunk of the brachial plexus on the right, of severe degree by electrical criteria. The C5 and C6 nerve roots appear to be normal, closely spaced study of the cervical paraspinal muscles having failed to demonstrate any abnormalities.                Additionally, there is slowing of conduction of the R median nerve across the wrist (carpal tunnel) and of the R ulnar nerve across the elbow (ulnar groove/cubital tunnel). Both are of mild degree electrically and exhibit exclusively chronic features.

## 2024-07-30 ENCOUNTER — TELEPHONE (OUTPATIENT)
Dept: FAMILY MEDICINE CLINIC | Age: 74
End: 2024-07-30

## 2024-07-30 NOTE — TELEPHONE ENCOUNTER
Patient called to report that he had an EMG done yesterday at [facility]. He said the results were not good. They told him his provider can access the records. He wants you to know that he is mentally and physically depressed.  Pt also said he has his MRI from January if you need to see it.      Thank you.

## 2024-07-30 NOTE — PROCEDURES
Electromyography Laboratory Report       Accreditation with Exemplary Status       Name MK CARMONA MRN [id number] Ref Provider CHELLE JULIANA Technologist JOZEF Awad    Gender Male Age 73 Years EDX Physician Tristen Warner MD  Temp ºC 35    1950 Height 5 feet 7 inch Order No [id number] Study Date 2024 9:58 AM      Reason for Referral:   Cervical Radiculopathy,         Motor Nerve Conduction Study   Nerve/Sites Muscle Amplitude Latency Velocity F min     mV ms m/s ms     Right Left Ref. Right Left Ref. Right Left Ref. Right Left Ref.   Median - APB      Wrist APB 4.4 7.3 >=5.0 4.0 3.9 <=4.0    25.9 29.1 <=31.5      AC Fossa APB 4.3 6.6  7.9 8.1  59.7 49.7 >=50.0      Ulnar - ADM      Wrist ADM 10.2 7.7 >=7.0 3.0 2.4 <=3.1    27.4 26.9 <=30.9      B. Elbow ADM 9.4 6.3  5.7 5.4  57.2 57.9 >=50.0         A. Elbow ADM 8.8 7.3  7.9 7.4  46.6 50.0 >=50.0          Sensory Nerve Conduction Study   Nerve/Sites Rec. Site Amplitude Peak Lat Velocity     µV ms m/s     Right Left Ref. Right Left Ref. Right Left Ref.   Median      Wrist Index 0.21 11.3 >=10.0 4.1 4.1 <=3.8 44.4 52.6 >=50.0   Ulnar      Wrist Little 5.2 10.2 >=5.0 3.6 3.2 <=3.2 51.6 52.9 >=50.0   Radial      Forearm Snuff Box 23.1 24.0 >=10.0 2.7 2.7 <=2.7 64.0 59.1 >=50.0   Median, Ulnar - Palmar Mixed      Median  Palm Med Wr 15.0 19.8  2.6 2.4 <=2.2 39.4 42.7 >=49.0      Ulnar Palm Uln Wr 12.7 20.2  2.2 2.1 <=2.2 56.9 48.0 >=49.0                        EMG Summary Table     Spontaneous Voluntary Activity   Muscle Nerve Roots Ins Act Fibs/PSW Fasc Other Amp Dur Phases Recruitment Activation Notes   R. APB - Abd Poll Brev Median C8-T1 NL 0 0 - -1 +1 NL NL NL -   R. PT - Pron Teres Median C6-C7 NL 0 0 - NL NL NL NL Fair -   R. FDI - First Dors Int Ulnar C8-T1 NL 0 0 - NL +1 NL NL NL -   R. FCU - Flex Carp Uln Ulnar C7-T1 NL 0 0 - NL NL NL SL NL -   R. EIP - Ext Ind Prop Radial C7-C8 NL 0 0 - NL NL NL NL NL -   R. Brachioradialis Radial C5-C6 NL 0 0 - NL NL  NL NL NL -   R. TB - Triceps Brach Radial C6-C8 NL 0 0 - NL NL NL NL Fair -   R. BB - Biceps Brach Musculocutaneous C5-C6 NL +4 0 - -1 +3 NL MK Poor -   R. MD - Deltoid (med) Axillary C5-C6 NL 0 0 - -2 +2 N/+1 MO Poor -   R. SS - Supraspinatus Suprascapular C5-C6 NL +3 0 - NL +2 NL MO Poor atrophy   R. IS - Infraspinatus Suprascapular C5-C6 NL +3 0 - NL NL NL NL Poor -   R. Cervical psp (low)  - NL 0 0 - NL NL NL NL NL -   R. Cervical psp (mid)  - NL 0 0 - NL NL NL NL NL -   R. Cervical psp (up)  - NL 0 0 - NL NL NL NL NL -   R. Pect major Medial pectoral C5-C8 NL 0 0 - NL NL NL NL NL -   L. APB - Abd Poll Brev Median C8-T1 NL 0 0 - NL NL NL SL NL -   L. PT - Pron Teres Median C6-C7 NL 0 0 - NL NL NL SL NL -   L. FDI - First Dors Int Ulnar C8-T1 NL 0 0 - NL NL NL NL NL -   L. FCU - Flex Carp Uln Ulnar C7-T1 NL 0 0 - NL NL NL NL NL -   L. TB - Triceps Brach Radial C6-C8 NL 0 0 - NL NL NL NL NL -   L. BB - Biceps Brach Musculocutaneous C5-C6 NL 0 0 - NL NL NL NL NL -   L. MD - Deltoid (med) Axillary C5-C6 NL 0 0 - NL NL NL NL NL -   L. Cervical psp (low)  - NL 0 0 - NL NL NL NL NL -   L. Cervical psp (mid)  - NL 0 0 - NL NL NL NL NL -   L. Cervical psp (up)  - NL 0 0 - NL NL NL NL NL -       NL = Normal, Inc = Increased, Dec = Decreased, CRD = Complex Repetitive Discharge; SL = Slightly Decreased; MO = Moderately Decreased; MK = Markedly Decreased; N/+1, +1, +2, +3 = Borderline, Slightly, Moderately, Markedly Increased; N/-1, -1, -2, -3 = Borderline, Slightly, Moderately, Markedly Decreased, N/A = Not Applicable    Summary:     Impression:   This study reveals ENMG evidence of n acute, neuropathic, axonal process affecting the upper trunk of the brachial plexus on the right, of severe degree by electrical criteria. The C5 and C6 nerve roots appear to be normal, closely spaced study of the cervical paraspinal muscles having failed to demonstrate any abnormalities.   Additionally, there is slowing of conduction of the R  median nerve across the wrist (carpal tunnel) and of the R ulnar nerve across the elbow (ulnar groove/cubital tunnel). Both are of mild degree electrically and exhibit exclusively chronic features.

## 2024-08-02 ENCOUNTER — OFFICE VISIT (OUTPATIENT)
Dept: ORTHOPEDIC SURGERY | Facility: CLINIC | Age: 74
End: 2024-08-02
Payer: MEDICARE

## 2024-08-02 ENCOUNTER — TELEPHONE (OUTPATIENT)
Dept: FAMILY MEDICINE CLINIC | Age: 74
End: 2024-08-02

## 2024-08-02 DIAGNOSIS — M50.30 DDD (DEGENERATIVE DISC DISEASE), CERVICAL: ICD-10-CM

## 2024-08-02 DIAGNOSIS — S46.011A TRAUMATIC COMPLETE TEAR OF RIGHT ROTATOR CUFF, INITIAL ENCOUNTER: Primary | ICD-10-CM

## 2024-08-02 NOTE — TELEPHONE ENCOUNTER
Pt. Called in to advise he seen Dr. Jones for his shoulder. Dr. WRIGHT stated he has a pinched nerve or a severed  nerve. Stated Dr. WRIGHT will not do surgery on his rotator cuff, states that won't help. He advised him to be seen with a neurosurgeon. Pt. Is asking for an order for a CT to see the condition of the bones. (Pt. States he does not want plates and screws) also states he is paralyzed in his right arm. Asked for an urgent appt with Dr. Lilly. I scheduled him for 8/7/24 please call if you have something sooner.

## 2024-08-02 NOTE — PROGRESS NOTES
History: Tom is here for right arm and neck.  He has had several procedures on his neck in [place] and had an episode after surgery where he had acute onset of pain and dysfunction right arm.  MRI showed a 2 to 3 cm rotator cuff tear with some mild atrophy.  Due to his profound loss of function of the arm we did send him for a new EMG.  He also had an MRI of his cervical spine and January.    Past medical history: Multiple  Medications: Multiple  Allergies: No known drug allergies    Please refer to the intake H&P regarding the patient's review of systems, family history and social history as was done today    HEENT: Normal  Lungs: Clear to auscultation  Heart: RRR  Abdomen: Soft, nontender  Skin: clear  Extremity: He has almost no function of his right arm with abduction to 20 degrees and forward flexion to 30 degrees.  External rotation is to 10 degrees.  He has very little strength with any resisted maneuvers.  He can move the elbow and hand.  He does get pain up into the neck with any rotation or extension.  Contralateral exam is normal for strength, motion, stability and neurovascular assessment.    Radiographs: Previous shoulder x-rays showed AC joint DJD with good alignment of the glenohumeral joint.  Cervical MRI from January showed severe bilateral aminal stenosis at C3-4 as well as changes at C3-4-5 C5-6 and more moderate changes below.  Shoulder MRI showed a full-thickness rotator cuff tear with 2 to 3 cm of retraction atrophy.  EMG findings show a severe brachial plexopathy on the right as well.    Assessment: Right arm brachial plexopathy with history of severe cervical DDD, full-thickness rotator cuff tear    Plan: He has a complex issue and very limited function of the right arm.  We discussed that his brachial plexus injury is preventing the signal from reaching any of the shoulder muscles and even rotator cuff surgery or reverse shoulder arthroplasty would not be helpful in this setting.   He needs to have his cervical spine and brachial plexus issues addressed and I would recommend evaluation by the neurosurgery team here.  His prior procedures were all done in [place].  He can continue work on shoulder exercises to maintain motion.  He will follow-up with neurosurgery team as directed and we can see him back after his neck issues have resolved.  All questions were answered today with the patient.    This note was generated with voice recognition software and may contain grammatical errors.

## 2024-08-07 ENCOUNTER — OFFICE VISIT (OUTPATIENT)
Dept: FAMILY MEDICINE CLINIC | Age: 74
End: 2024-08-07
Payer: MEDICARE

## 2024-08-07 ENCOUNTER — TELEPHONE (OUTPATIENT)
Dept: FAMILY MEDICINE CLINIC | Age: 74
End: 2024-08-07

## 2024-08-07 VITALS
TEMPERATURE: 97.4 F | WEIGHT: 136 LBS | BODY MASS INDEX: 21.86 KG/M2 | SYSTOLIC BLOOD PRESSURE: 118 MMHG | DIASTOLIC BLOOD PRESSURE: 80 MMHG | HEIGHT: 66 IN

## 2024-08-07 DIAGNOSIS — M48.02 CERVICAL SPINAL STENOSIS: ICD-10-CM

## 2024-08-07 DIAGNOSIS — M54.12 CERVICAL RADICULOPATHY: ICD-10-CM

## 2024-08-07 DIAGNOSIS — M75.121 NONTRAUMATIC COMPLETE TEAR OF RIGHT ROTATOR CUFF: ICD-10-CM

## 2024-08-07 DIAGNOSIS — G54.0 BRACHIAL PLEXOPATHY: Primary | ICD-10-CM

## 2024-08-07 DIAGNOSIS — M48.02 CERVICAL SPINAL STENOSIS: Primary | ICD-10-CM

## 2024-08-07 PROCEDURE — 99213 OFFICE O/P EST LOW 20 MIN: CPT | Performed by: FAMILY MEDICINE

## 2024-08-07 PROCEDURE — 1123F ACP DISCUSS/DSCN MKR DOCD: CPT | Performed by: FAMILY MEDICINE

## 2024-08-07 NOTE — PROGRESS NOTES
General: Skin is warm and dry.   Neurological:      Mental Status: He is alert.      Motor: Weakness (EMG shows severe plexopathy) present.         t.  Assessment & Plan   1. Brachial plexopathy-the EMG findings are fairly new.  He cannot have his shoulder surgery until the neck is fixed since those muscles are not receiving any neural input.  The orthopedic surgeon is recommending an MRI of the cervical spine and possibly a CT.  He has also been referred to neurosurgery for a second opinion.  -     MRI CERVICAL SPINE WO CONTRAST; Future  2. Cervical spinal stenosis  -     MRI CERVICAL SPINE WO CONTRAST; Future  3. Nontraumatic complete tear of right rotator cuff     Orders Placed This Encounter   Procedures    MRI CERVICAL SPINE WO CONTRAST     Standing Status:   Future     Standing Expiration Date:   8/7/2025     No orders of the defined types were placed in this encounter.    There are no discontinued medications.  No follow-ups on file.        Reviewed with the patient: current clinical status, medications, activities and diet.     Side effects, adverse effects of the medication prescribed today, as well as treatment plan/ rationale and result expectations have been discussed with the patient who expresses understanding and desires to proceed.    Close follow up to evaluate treatment results and for coordination of care.  I have reviewed the patient's medical history in detail and updated the computerized patient record.    SHAHEEN WOODRUFF, DO

## 2024-08-16 ENCOUNTER — OFFICE VISIT (OUTPATIENT)
Dept: ORTHOPEDIC SURGERY | Facility: CLINIC | Age: 74
End: 2024-08-16
Payer: MEDICARE

## 2024-08-16 ENCOUNTER — HOSPITAL ENCOUNTER (OUTPATIENT)
Dept: RADIOLOGY | Facility: CLINIC | Age: 74
Discharge: HOME | End: 2024-08-16
Payer: MEDICARE

## 2024-08-16 VITALS — BODY MASS INDEX: 21.5 KG/M2 | WEIGHT: 137 LBS | HEIGHT: 67 IN

## 2024-08-16 DIAGNOSIS — M54.12 CERVICAL RADICULOPATHY: Primary | ICD-10-CM

## 2024-08-16 DIAGNOSIS — M54.2 NECK PAIN: ICD-10-CM

## 2024-08-16 PROCEDURE — 99215 OFFICE O/P EST HI 40 MIN: CPT | Performed by: ORTHOPAEDIC SURGERY

## 2024-08-16 PROCEDURE — 1157F ADVNC CARE PLAN IN RCRD: CPT | Performed by: ORTHOPAEDIC SURGERY

## 2024-08-16 PROCEDURE — 3008F BODY MASS INDEX DOCD: CPT | Performed by: ORTHOPAEDIC SURGERY

## 2024-08-16 PROCEDURE — 72050 X-RAY EXAM NECK SPINE 4/5VWS: CPT

## 2024-08-16 NOTE — PROGRESS NOTES
Tom Pierre Jr. is a 73 y.o. male who presents for New Patient Visit of the Neck (X-rays today/MRI at [facility]/EMG at ).    HPI:  73-year-old gentleman here for new patient visit of neck pain.  He has an  MRI at [facility].  He denies any fever chills nausea vomiting night sweats.  He has no bowel or bladder complaints.    Physical exam:  Physical exam is limited due to patient's pain and guarding of his right arm.  Well-nourished, well kept.  No lymphangitis or lymphadenopathy in the examined extremities. Affect normal.  Alert and oriented X 3.  Coordination normal.   Patient has some tenderness in the paraspinal musculature of the cervical spine.  Range of motion of the shoulders, elbows, wrists, and fingers on the right is severely limited, he cannot abduct his right arm due to severe shoulder pain.  Limited physical exam on muscle strength on the right arm.  He has good  strength.  He has good wrist flexion and extension.  Range of motion in the left upper extremity is full without crepitance, instability, or exacerbation of pain. Strength is 5/5 throughout. no redness, abrasions, or lesions on the upper extremities bilaterally.  Gross sensation intact to the extremities.     Imaging studies:  AP lateral flexion-extension plain films of the cervical spine were ordered and reviewed today.  An MRI from [facility] from January 10, 2024 was reviewed today.    Assessment:  73-year-old gentleman here for new patient visit of mostly right arm pain and inability to move right arm.  He may have some neck tenderness as well.  He was recently told he had a full-thickness rotator cuff tear in his right arm.  He cannot abduct his right arm without significant pain.  His physical exam is limited due to pain and guarding.  I do not get any signs of myelopathy from the limited exam that I could get.  He has had 3 prior neck surgeries, patient states they have all been anterior approach.  No history of posterior  approach.  He does not have hardware, these were all laminectomy type surgeries.  He has been having problems for quite some time.  An EMG shows some involvement of C5 5 and C6.  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    We have ordered and reviewed test today, x-rays and MRI.  I reviewed the note from Dr. Kendall from [facility] that discusses his neck issues and EMG results.  This is a severe exacerbation of a chronic problem that is severely affecting his bodily function.  We did consider and discuss surgery today.    For complete plan and/or surgical details, please refer to Dr. Romero's portion of this split dictation.    -Chuck Ordoñez PA-C      In a face-to-face encounter, I performed a history and physical examination, discussed pertinent diagnostic studies if indicated, and discussed diagnosis and management strategies with both the patient and the midlevel provider.  I reviewed the midlevel's note and agree with the documented findings and plan of care.    Patient with neck pain and severe right arm weakness.  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  He does have a rotator cuff tear on the right but he has biceps and deltoid weakness on the right which is profound.  He wants to have that nerve decompressed.  My partner, Dr. Clayton per the patient's story, will not do surgery on his shoulder until his neck is fixed first.  X-rays show diffuse degenerative changes most notable at C6-7 and C5-6.  The MRI shows severe central stenosis C3-4 and C4-5 and some stenosis at C5-6 as well.  I do not see a ton of stenosis the levels below that maybe a little bit bilaterally at C6-7.  The patient says he would not have any surgery that requires hardware.  I explained to him that the only surgery I can think of would require hardware.  However, he wants a second opinion.  I will for him to one of my colleagues downtown and he can follow-up with me on a as needed basis.  He is severely  inhibited with bodily function.  We did discuss and consider surgery on him today.    John Romero MD  Orthopedic surgery

## 2024-08-20 ENCOUNTER — OFFICE VISIT (OUTPATIENT)
Dept: FAMILY MEDICINE CLINIC | Age: 74
End: 2024-08-20
Payer: MEDICARE

## 2024-08-20 VITALS
BODY MASS INDEX: 22.18 KG/M2 | SYSTOLIC BLOOD PRESSURE: 104 MMHG | HEIGHT: 66 IN | DIASTOLIC BLOOD PRESSURE: 64 MMHG | HEART RATE: 82 BPM | TEMPERATURE: 97.8 F | OXYGEN SATURATION: 98 % | WEIGHT: 138 LBS

## 2024-08-20 DIAGNOSIS — M48.02 CERVICAL SPINAL STENOSIS: Primary | ICD-10-CM

## 2024-08-20 DIAGNOSIS — G54.0 BRACHIAL PLEXOPATHY: ICD-10-CM

## 2024-08-20 DIAGNOSIS — M75.121 NONTRAUMATIC COMPLETE TEAR OF RIGHT ROTATOR CUFF: ICD-10-CM

## 2024-08-20 PROCEDURE — 1123F ACP DISCUSS/DSCN MKR DOCD: CPT | Performed by: FAMILY MEDICINE

## 2024-08-20 PROCEDURE — 99213 OFFICE O/P EST LOW 20 MIN: CPT | Performed by: FAMILY MEDICINE

## 2024-08-20 NOTE — PROGRESS NOTES
Subjective  Eh BALDERRAMA Nii 1950 is a 73 y.o. male who presents today with:  No chief complaint on file.      HPI  I have reviewed HPI and agree with above. Eh had an EMG done which illustrated a severe brachial plexopathy.  Marked at C5 and 6.  Previous shoulder x-rays showed AC joint DJD with good alignment of the glenohumeral joint. Cervical MRI from January showed severe bilateral aminal stenosis at C3-4 as well as changes at C3-4-5 C5-6 and more moderate changes below. Shoulder MRI showed a full-thickness rotator cuff tear with 2 to 3 cm of retraction atrophy. EMG findings show a severe brachial plexopathy on the right as well.  He has MRI scheduled on the 22nd.  Saw Dr. Roman, who has recommended he see Dr. Blas Thomas at .(Dr. Roman' note has been reviewed.    Review of Systems   All other systems reviewed and are negative.      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    SPINE SURGERY  1994, 1997, 2011    [facility], Dr Hutton ([facility])    SPINE SURGERY      March 2018, May [facility]      Social History     Socioeconomic History    Marital status:

## 2024-08-22 ENCOUNTER — HOSPITAL ENCOUNTER (OUTPATIENT)
Dept: MRI IMAGING | Age: 74
Discharge: HOME | End: 2024-08-24
Attending: FAMILY MEDICINE
Payer: MEDICARE

## 2024-08-22 DIAGNOSIS — G54.0 BRACHIAL PLEXOPATHY: ICD-10-CM

## 2024-08-22 DIAGNOSIS — M48.02 CERVICAL SPINAL STENOSIS: ICD-10-CM

## 2024-08-22 PROCEDURE — 72141 MRI NECK SPINE W/O DYE: CPT

## 2024-08-23 ENCOUNTER — TELEPHONE (OUTPATIENT)
Dept: FAMILY MEDICINE CLINIC | Age: 74
End: 2024-08-23

## 2024-08-23 DIAGNOSIS — G54.0 BRACHIAL PLEXOPATHY: Primary | ICD-10-CM

## 2024-08-23 NOTE — TELEPHONE ENCOUNTER
Patient called and made a clear understanding of MRI results, and is going to call to schedule Brachial MRI.

## 2024-08-23 NOTE — TELEPHONE ENCOUNTER
Patient is inquiring about results from the MRI he had done yesterday.    He also states the tech who did his MRI, recommended he have a MRI of the Brachial Plexus, so he is inquiring if this can be ordered.

## 2024-08-27 ENCOUNTER — TELEPHONE (OUTPATIENT)
Dept: FAMILY MEDICINE CLINIC | Age: 74
End: 2024-08-27

## 2024-08-27 NOTE — TELEPHONE ENCOUNTER
PT called to advise  He had appt with Dr Thomas/Gael cortez-  He reviewed MRI and saw no changes..  He let him know that Dr Sotelo did not want to do surgery -   Dr Thomas said he had a breko Plexus and will dissipate with in 6 months

## 2024-08-30 ENCOUNTER — TELEPHONE (OUTPATIENT)
Dept: FAMILY MEDICINE CLINIC | Age: 74
End: 2024-08-30

## 2024-08-30 DIAGNOSIS — I65.01 OBSTRUCTION OF RIGHT VERTEBRAL ARTERY: Primary | ICD-10-CM

## 2024-08-30 NOTE — TELEPHONE ENCOUNTER
Patient calling in says 8/27 saw spine surgeon at [facility] in [place], he was given full exam. Neck is fine no critical injury, said shoulder needs surgery.    Also to let PCP know that he has a MRI on 9/4    Please call patient back to touch base in findings   [phone]

## 2024-09-04 ENCOUNTER — TELEPHONE (OUTPATIENT)
Dept: FAMILY MEDICINE CLINIC | Age: 74
End: 2024-09-04

## 2024-09-04 ENCOUNTER — HOSPITAL ENCOUNTER (OUTPATIENT)
Dept: MRI IMAGING | Age: 74
Discharge: HOME | End: 2024-09-06
Attending: FAMILY MEDICINE
Payer: MEDICARE

## 2024-09-04 ENCOUNTER — OFFICE VISIT (OUTPATIENT)
Dept: ORTHOPEDIC SURGERY | Facility: CLINIC | Age: 74
End: 2024-09-04
Payer: MEDICARE

## 2024-09-04 DIAGNOSIS — G54.0 BRACHIAL PLEXOPATHY: ICD-10-CM

## 2024-09-04 DIAGNOSIS — S46.011A TRAUMATIC COMPLETE TEAR OF RIGHT ROTATOR CUFF, INITIAL ENCOUNTER: Primary | ICD-10-CM

## 2024-09-04 DIAGNOSIS — G54.0 THORACIC OUTLET SYNDROME: ICD-10-CM

## 2024-09-04 DIAGNOSIS — M75.121 NONTRAUMATIC COMPLETE TEAR OF RIGHT ROTATOR CUFF: Primary | ICD-10-CM

## 2024-09-04 PROCEDURE — 99213 OFFICE O/P EST LOW 20 MIN: CPT | Performed by: ORTHOPAEDIC SURGERY

## 2024-09-04 PROCEDURE — 1157F ADVNC CARE PLAN IN RCRD: CPT | Performed by: ORTHOPAEDIC SURGERY

## 2024-09-04 PROCEDURE — 1159F MED LIST DOCD IN RCRD: CPT | Performed by: ORTHOPAEDIC SURGERY

## 2024-09-04 PROCEDURE — 73218 MRI UPPER EXTREMITY W/O DYE: CPT

## 2024-09-04 NOTE — TELEPHONE ENCOUNTER
Patient called from Dr. Jones's office to let you know that it \"doesn't look good\". He said he doesn't want to do surgery. He wants to wait until the nerve comes back.   He said he will call back with more details after his appt.      Thank you.

## 2024-09-04 NOTE — TELEPHONE ENCOUNTER
PT called back he was at  Dr Jones today. He suggests that PT see a BRACHIAL PLEXUS doctor  PT would like a 2nd opinion  for his rotor cuff/ripp-   Dr ocampo told PT is Dr Jones would not operate to find someone else-     Dr Jones states there is no nerve there & surgery will not work. He wants to wait until it heals. PT wants surgery ASAP.    Please contact PT with 2nd opinion referral

## 2024-09-04 NOTE — PROGRESS NOTES
History: Tom is here for his right shoulder.  We have seen him several times for his right arm pain and weakness.  He has a known full-thickness rotator cuff tear but also a brachial plexopathy after prior cervical surgery.  He did go to see Dr. Romero and then the orthopedic team at [facility] who felt that there was no need for further surgical intervention.  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    Past medical history: Multiple  Medications: Multiple  Allergies: No known drug allergies    Please refer to the intake H&P regarding the patient's review of systems, family history and social history as was done today    HEENT: Normal  Lungs: Clear to auscultation  Heart: RRR  Abdomen: Soft, nontender  Skin: clear  Extremity: He has less than 20 degrees of active abduction.  External rotation is to 15 degrees today.  He has 4 out of 5 strength with any resisted rotator cuff motion.  He does get pain up in the neck with rotation.  No numbness or tingling in the hand today.  Contralateral exam is normal for strength, motion, stability and neurovascular assessment.    Radiographs: Multiple studies were again reviewed showing a full-thickness rotator cuff tear of 2 to 3 cm with mild retraction and atrophy.  EMG showed a severe brachial plexopathy.    Assessment: Right arm brachial plexopathy with full-thickness rotator cuff tear status post prior cervical spine surgery x 3    Plan: We had again a long discussion regarding the nature of his nerve injury.  Shoulder surgery for rotator cuff repair or even arthroplasty would not be helpful until the nerve function has been restored giving signal to the muscle.  I recommended he see a brachial plexus specialist as he is already had a brachial plexus MRI.  Hopefully they can give him an opinion regarding potential resolution of this nerve issue.  I would then be happy to see him back to consider options for his shoulder.   The spine team did not think any further surgery would be helpful at this time.  All questions were answered today with the patient.    This note was generated with voice recognition software and may contain grammatical errors.

## 2024-09-06 ENCOUNTER — TELEMEDICINE (OUTPATIENT)
Dept: FAMILY MEDICINE CLINIC | Age: 74
End: 2024-09-06

## 2024-09-06 DIAGNOSIS — Z00.00 MEDICARE ANNUAL WELLNESS VISIT, SUBSEQUENT: Primary | ICD-10-CM

## 2024-09-06 ASSESSMENT — PATIENT HEALTH QUESTIONNAIRE - PHQ9
1. LITTLE INTEREST OR PLEASURE IN DOING THINGS: SEVERAL DAYS
2. FEELING DOWN, DEPRESSED OR HOPELESS: SEVERAL DAYS
SUM OF ALL RESPONSES TO PHQ QUESTIONS 1-9: 2
SUM OF ALL RESPONSES TO PHQ QUESTIONS 1-9: 2
SUM OF ALL RESPONSES TO PHQ9 QUESTIONS 1 & 2: 2
SUM OF ALL RESPONSES TO PHQ QUESTIONS 1-9: 2
SUM OF ALL RESPONSES TO PHQ QUESTIONS 1-9: 2

## 2024-09-06 ASSESSMENT — LIFESTYLE VARIABLES
HOW MANY STANDARD DRINKS CONTAINING ALCOHOL DO YOU HAVE ON A TYPICAL DAY: PATIENT DOES NOT DRINK
HOW OFTEN DO YOU HAVE A DRINK CONTAINING ALCOHOL: NEVER

## 2024-09-06 NOTE — PROGRESS NOTES
Medicare Annual Wellness Visit    Eh Bales is here for Medicare AWV    Assessment & Plan   Medicare annual wellness visit, subsequent    Recommendations for Preventive Services Due: see orders and patient instructions/AVS.  Recommended screening schedule for the next 5-10 years is provided to the patient in written form: see Patient Instructions/AVS.     Return in 1 year (on 9/6/2025) for Medicare AWV.     Subjective       Patient's complete Health Risk Assessment and screening values have been reviewed and are found in Flowsheets. The following problems were reviewed today and where indicated follow up appointments were made and/or referrals ordered.    Positive Risk Factor Screenings with Interventions:       Cognitive:      Words recalled: 2 Words Recalled     Total Score Interpretation: Abnormal Mini-Cog  Interventions:  Patient declines any further evaluation or treatment           General HRA Questions:  Select all that apply: (!) New or Increased Pain, Social Isolation, Anger, Stress  Interventions - Pain:  Patient advised to follow up in the office for further evaluation and treatment  Interventions - Social Isolation:  Patient advised to follow up in the office for further evaluation and treatment  Interventions - Stress:  Patient advised to follow up in the office for further evaluation and treatment  Interventions - Anger:  Patient advised to follow up in the office for further evaluation and treatment         Has appointment 9/11 with PCP  Vision Screen:  Do you have difficulty driving, watching TV, or doing any of your daily activities because of your eyesight?: No  Have you had an eye exam within the past year?: (!) No  Interventions:   Patient encouraged to make appointment with their eye specialist     ADL's:   Patient reports needing help with:  Select all that apply: (!) Bathing, Walking/Balance  Select all that apply: (!) Shopping, Transportation, Laundry  Interventions:  Patient declined

## 2024-09-06 NOTE — TELEPHONE ENCOUNTER
Patient calling in, scheduled for 9/11/24  Patient would like Dr. Lilly to read results of imaging  Specialist for brachial plexus requested    Advised patient Dr. Lilly is out of office today 12pm

## 2024-09-12 ENCOUNTER — OFFICE VISIT (OUTPATIENT)
Dept: ORTHOPEDIC SURGERY | Facility: CLINIC | Age: 74
End: 2024-09-12
Payer: MEDICARE

## 2024-09-12 VITALS — BODY MASS INDEX: 21.97 KG/M2 | WEIGHT: 140 LBS | HEIGHT: 67 IN

## 2024-09-12 DIAGNOSIS — S14.3XXS BRACHIAL PLEXUS INJURY, RIGHT, SEQUELA: Primary | ICD-10-CM

## 2024-09-12 DIAGNOSIS — G54.0 THORACIC OUTLET SYNDROME: ICD-10-CM

## 2024-09-12 PROCEDURE — 3008F BODY MASS INDEX DOCD: CPT | Performed by: ORTHOPAEDIC SURGERY

## 2024-09-12 PROCEDURE — 99213 OFFICE O/P EST LOW 20 MIN: CPT | Performed by: ORTHOPAEDIC SURGERY

## 2024-09-12 PROCEDURE — 1159F MED LIST DOCD IN RCRD: CPT | Performed by: ORTHOPAEDIC SURGERY

## 2024-09-12 PROCEDURE — 99214 OFFICE O/P EST MOD 30 MIN: CPT | Performed by: ORTHOPAEDIC SURGERY

## 2024-09-12 PROCEDURE — 1157F ADVNC CARE PLAN IN RCRD: CPT | Performed by: ORTHOPAEDIC SURGERY

## 2024-09-12 NOTE — PROGRESS NOTES
History present illness: Patient presents today for evaluation of complex right upper extremity pain numbness tingling and dysfunction.  He has EMG nerve conduction study test from 29 July 2024 that shows evidence for severe upper trunk brachial plexus injury.  The patient has a difficult time verbalizing exactly what his problem is.  It sounds as though most of it has to do with inability to activate shoulder abduction and forward flexion.  He has history of right-sided C3-C5 surgery done in [place].  He feels all of this started directly paired with that surgical intervention.  Patient saw Dr. Dockery with spine surgery.  The patient is a bit unclear as to the exact discussion with Dr. Dockery but did feel he was told there was evidence for brachial plexus injury but no additional cervical surgery necessary.  He saw Dr. Clayton who felt there was discrete shoulder pathology with superimposed brachial plexus injury.      Past medical history: The patient's past medical history, family history, social history, and review of systems were documented on the patient medical intake.  The updated data was reviewed in the electronic medical record.  History is negative except otherwise stated in history of present illness.        Physical examination:  General: Alert and oriented to person, place, and time.  No acute distress and breathing comfortably: Pleasant and cooperative with examination.  HEENT: Head is normocephalic and atraumatic.  Neck: Supple, no visible swelling.  Cardiovascular: No palpable tachycardia  Lungs: No audible wheezing or labored breathing  Abdomen: Nondistended.  Extremities: Evaluation of the right upper extremity finds supple compartments to arm forearm and hand.  Palpable radial artery at the wrist.  Digital flexion and extension wrist flexion extension and elbow flexion extension intact.  Difficulties with active abduction of the shoulder or forward flexion.  Little to no motion  there.      Radiology:      Assessment: Complex motor deficit to right upper extremity of unclear etiology likely secondary to combination of brachial plexus injury and rotator cuff tear and/or other discrete shoulder pathology      Plan: Treatment options were discussed.  We explained to the patient that I am a hand and wrist specialist who does not treat acute or chronic brachial plexus injury.  We will work to facilitate referral to a specialist who does treat plexus injury.  No dedicated follow-up necessary with me.          Procedure:

## 2024-09-16 ENCOUNTER — OFFICE VISIT (OUTPATIENT)
Dept: FAMILY MEDICINE CLINIC | Age: 74
End: 2024-09-16
Payer: MEDICARE

## 2024-09-16 VITALS
SYSTOLIC BLOOD PRESSURE: 116 MMHG | HEIGHT: 66 IN | WEIGHT: 138.8 LBS | HEART RATE: 93 BPM | OXYGEN SATURATION: 96 % | TEMPERATURE: 97.7 F | BODY MASS INDEX: 22.31 KG/M2 | DIASTOLIC BLOOD PRESSURE: 70 MMHG

## 2024-09-16 DIAGNOSIS — I65.01 OBSTRUCTION OF RIGHT VERTEBRAL ARTERY: ICD-10-CM

## 2024-09-16 DIAGNOSIS — M75.121 NONTRAUMATIC COMPLETE TEAR OF RIGHT ROTATOR CUFF: Primary | ICD-10-CM

## 2024-09-16 DIAGNOSIS — G54.0 BRACHIAL PLEXOPATHY: ICD-10-CM

## 2024-09-16 PROCEDURE — 1123F ACP DISCUSS/DSCN MKR DOCD: CPT | Performed by: FAMILY MEDICINE

## 2024-09-16 PROCEDURE — 99214 OFFICE O/P EST MOD 30 MIN: CPT | Performed by: FAMILY MEDICINE

## 2024-09-18 ENCOUNTER — HOSPITAL ENCOUNTER (OUTPATIENT)
Dept: MRI IMAGING | Age: 74
Discharge: HOME | End: 2024-09-20
Attending: FAMILY MEDICINE
Payer: MEDICARE

## 2024-09-18 DIAGNOSIS — I65.01 OBSTRUCTION OF RIGHT VERTEBRAL ARTERY: ICD-10-CM

## 2024-09-18 PROCEDURE — 70547 MR ANGIOGRAPHY NECK W/O DYE: CPT

## 2024-09-18 PROCEDURE — 70544 MR ANGIOGRAPHY HEAD W/O DYE: CPT

## 2024-10-04 ENCOUNTER — OFFICE VISIT (OUTPATIENT)
Dept: FAMILY MEDICINE CLINIC | Age: 74
End: 2024-10-04
Payer: MEDICARE

## 2024-10-04 VITALS
SYSTOLIC BLOOD PRESSURE: 132 MMHG | DIASTOLIC BLOOD PRESSURE: 72 MMHG | HEIGHT: 66 IN | WEIGHT: 140.4 LBS | TEMPERATURE: 98 F | BODY MASS INDEX: 22.56 KG/M2

## 2024-10-04 DIAGNOSIS — G54.0 BRACHIAL PLEXOPATHY: Primary | ICD-10-CM

## 2024-10-04 DIAGNOSIS — M75.121 NONTRAUMATIC COMPLETE TEAR OF RIGHT ROTATOR CUFF: ICD-10-CM

## 2024-10-04 PROCEDURE — 99213 OFFICE O/P EST LOW 20 MIN: CPT | Performed by: FAMILY MEDICINE

## 2024-10-04 PROCEDURE — 1123F ACP DISCUSS/DSCN MKR DOCD: CPT | Performed by: FAMILY MEDICINE

## 2024-10-04 NOTE — PROGRESS NOTES
Mandy Bales 1950 is a 73 y.o. male who presents today with:  No chief complaint on file.      HPI  I have reviewed HPI and agree with above. He saw Dr. Gonzalez.  Said he needs to wait until the nerve wakes.  May take up to a year.  He thinks he might not even need surgery on the shoulder.    Review of Systems   All other systems reviewed and are negative.      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    SPINE SURGERY  1994, 1997, 2011    [facility], Dr Hutton ([facility])    SPINE SURGERY      March 2018, May 2019, [facility]      Social History     Socioeconomic History    Marital status:      Spouse name: Not on file    Number of children: 1    Years of education: Not on file    Highest education level: Not on file   Occupational History    Occupation: retired [employer]   Tobacco Use    Smoking status: Some Days     Current packs/day: 1.50     Average packs/day: 1.5 packs/day for 3.8 years (5.6 ttl pk-yrs)     Types: Cigarettes     Start date: 2021    Smokeless tobacco: Never   Vaping Use    Vaping status: Never Used   Substance and Sexual Activity    Alcohol use:

## 2024-10-21 ENCOUNTER — OFFICE VISIT (OUTPATIENT)
Dept: FAMILY MEDICINE CLINIC | Age: 74
End: 2024-10-21
Payer: MEDICARE

## 2024-10-21 VITALS
OXYGEN SATURATION: 97 % | HEART RATE: 79 BPM | WEIGHT: 139.2 LBS | DIASTOLIC BLOOD PRESSURE: 74 MMHG | SYSTOLIC BLOOD PRESSURE: 130 MMHG | BODY MASS INDEX: 22.37 KG/M2 | HEIGHT: 66 IN | TEMPERATURE: 97.2 F

## 2024-10-21 DIAGNOSIS — G54.0 BRACHIAL PLEXOPATHY: ICD-10-CM

## 2024-10-21 DIAGNOSIS — M75.121 NONTRAUMATIC COMPLETE TEAR OF RIGHT ROTATOR CUFF: Primary | ICD-10-CM

## 2024-10-21 PROCEDURE — 99213 OFFICE O/P EST LOW 20 MIN: CPT | Performed by: FAMILY MEDICINE

## 2024-10-21 PROCEDURE — 1123F ACP DISCUSS/DSCN MKR DOCD: CPT | Performed by: FAMILY MEDICINE

## 2024-10-21 NOTE — PROGRESS NOTES
per Neurology will need to wait until nerves \"awaken\"     No orders of the defined types were placed in this encounter.    No orders of the defined types were placed in this encounter.    There are no discontinued medications.  No follow-ups on file.        Reviewed with the patient: current clinical status, medications, activities and diet.     Side effects, adverse effects of the medication prescribed today, as well as treatment plan/ rationale and result expectations have been discussed with the patient who expresses understanding and desires to proceed.    Close follow up to evaluate treatment results and for coordination of care.  I have reviewed the patient's medical history in detail and updated the computerized patient record.    SHAHEEN WOODRUFF, DO

## 2024-10-24 ENCOUNTER — TELEPHONE (OUTPATIENT)
Dept: FAMILY MEDICINE CLINIC | Age: 74
End: 2024-10-24

## 2024-10-24 NOTE — TELEPHONE ENCOUNTER
Patient called to let you know that Dr. Lyon is scheduling surgeries out to March/April. He can't wait that long. He wants to know if he can be referred to someone else.      Thank you.

## 2024-10-25 ENCOUNTER — APPOINTMENT (OUTPATIENT)
Dept: NEUROSURGERY | Facility: CLINIC | Age: 74
End: 2024-10-25
Payer: MEDICARE

## 2024-10-25 NOTE — TELEPHONE ENCOUNTER
I want him to review shoulder surgeons and decide who he would like to see then let me know.  We have 2 here at [facility], Dr. Frye and Dr. Kuo.  He can also try to go back and see Robert or look for someone else in the  or [facility] system.

## 2024-10-30 ENCOUNTER — OFFICE VISIT (OUTPATIENT)
Dept: ORTHOPEDIC SURGERY | Facility: CLINIC | Age: 74
End: 2024-10-30
Payer: MEDICARE

## 2024-10-30 DIAGNOSIS — S14.3XXS BRACHIAL PLEXUS INJURY, RIGHT, SEQUELA: Primary | ICD-10-CM

## 2024-10-30 DIAGNOSIS — G54.5 PARSONAGE-TURNER SYNDROME: ICD-10-CM

## 2024-10-30 DIAGNOSIS — S46.011A TRAUMATIC COMPLETE TEAR OF RIGHT ROTATOR CUFF, INITIAL ENCOUNTER: ICD-10-CM

## 2024-10-30 PROCEDURE — 99213 OFFICE O/P EST LOW 20 MIN: CPT | Performed by: ORTHOPAEDIC SURGERY

## 2024-10-30 PROCEDURE — 1159F MED LIST DOCD IN RCRD: CPT | Performed by: ORTHOPAEDIC SURGERY

## 2024-10-30 PROCEDURE — 1157F ADVNC CARE PLAN IN RCRD: CPT | Performed by: ORTHOPAEDIC SURGERY

## 2024-11-04 ENCOUNTER — OFFICE VISIT (OUTPATIENT)
Dept: FAMILY MEDICINE CLINIC | Age: 74
End: 2024-11-04
Payer: MEDICARE

## 2024-11-04 VITALS
SYSTOLIC BLOOD PRESSURE: 122 MMHG | HEART RATE: 81 BPM | HEIGHT: 66 IN | TEMPERATURE: 97.5 F | BODY MASS INDEX: 22.08 KG/M2 | OXYGEN SATURATION: 96 % | WEIGHT: 137.4 LBS | DIASTOLIC BLOOD PRESSURE: 58 MMHG

## 2024-11-04 DIAGNOSIS — G54.0 BRACHIAL PLEXOPATHY: ICD-10-CM

## 2024-11-04 DIAGNOSIS — M75.121 NONTRAUMATIC COMPLETE TEAR OF RIGHT ROTATOR CUFF: Primary | ICD-10-CM

## 2024-11-04 PROCEDURE — 1123F ACP DISCUSS/DSCN MKR DOCD: CPT | Performed by: FAMILY MEDICINE

## 2024-11-04 PROCEDURE — 99213 OFFICE O/P EST LOW 20 MIN: CPT | Performed by: FAMILY MEDICINE

## 2024-11-04 NOTE — PROGRESS NOTES
Allergies  Current Outpatient Medications   Medication Sig Dispense Refill    Handicap Placard MISC by Does not apply route (Patient taking differently: by Does not apply route Lifetime Placard   2 given to patient on 5/28/24) 1 each 0     No current facility-administered medications for this visit.       PMH, Surgical Hx, Family Hx, and Social Hx reviewed and updated.  Health Maintenance reviewed.    Objective  Vitals:    11/04/24 0844 11/04/24 0846   BP: (!) 122/58 (!) 122/58   Pulse: 81    Temp: 97.5 °F (36.4 °C)    TempSrc: Temporal    SpO2: 96%    Weight: 62.3 kg (137 lb 6.4 oz)    Height: 1.676 m (5' 6\")      BP Readings from Last 3 Encounters:   11/04/24 (!) 122/58   10/21/24 130/74   10/04/24 132/72     Wt Readings from Last 3 Encounters:   11/04/24 62.3 kg (137 lb 6.4 oz)   10/21/24 63.1 kg (139 lb 3.2 oz)   10/04/24 63.7 kg (140 lb 6.4 oz)       No results found for: \"LABA1C\"  Lab Results   Component Value Date    CREATININE 0.88 04/19/2024     Lab Results   Component Value Date    ALT 21 01/02/2024    AST 29 01/02/2024     Lab Results   Component Value Date    CHOL 168 01/20/2020    TRIG 56 01/20/2020    HDL 76 (H) 01/20/2020          Physical Exam    Constitutional:       Appearance: Normal appearance.   Cardiovascular:      Rate and Rhythm: Normal rate and regular rhythm.      Pulses: Normal pulses.      Heart sounds: Normal heart sounds.   Pulmonary:      Effort: Pulmonary effort is normal.      Breath sounds: Normal breath sounds.   Abdominal:      General: Abdomen is flat.   Musculoskeletal:      Comments: He has almost no function of his right arm with abduction to 20 degrees and forward flexion to 30 degrees.  External rotation is to 10 degrees.  He has very little strength with any resisted maneuvers.  He can move the elbow and hand.  He does get pain up into the neck with any rotation or extension.  Contralateral exam is normal for strength, motion, stability and neurovascular assessmen   Skin:

## 2024-11-05 ENCOUNTER — OFFICE VISIT (OUTPATIENT)
Dept: FAMILY MEDICINE CLINIC | Age: 74
End: 2024-11-05
Payer: MEDICARE

## 2024-11-05 ENCOUNTER — HOSPITAL ENCOUNTER (OUTPATIENT)
Dept: PHYSICAL THERAPY | Age: 74
Setting detail: THERAPIES SERIES
Discharge: HOME | End: 2024-11-05
Attending: FAMILY MEDICINE
Payer: MEDICARE

## 2024-11-05 VITALS
TEMPERATURE: 97.3 F | RESPIRATION RATE: 12 BRPM | OXYGEN SATURATION: 98 % | WEIGHT: 137.35 LBS | BODY MASS INDEX: 22.07 KG/M2 | HEART RATE: 77 BPM | HEIGHT: 66 IN | SYSTOLIC BLOOD PRESSURE: 112 MMHG | DIASTOLIC BLOOD PRESSURE: 62 MMHG

## 2024-11-05 DIAGNOSIS — K12.0 APHTHOUS ULCERATION: Primary | ICD-10-CM

## 2024-11-05 PROCEDURE — 99213 OFFICE O/P EST LOW 20 MIN: CPT | Performed by: STUDENT IN AN ORGANIZED HEALTH CARE EDUCATION/TRAINING PROGRAM

## 2024-11-05 PROCEDURE — 97163 PT EVAL HIGH COMPLEX 45 MIN: CPT

## 2024-11-05 PROCEDURE — 97110 THERAPEUTIC EXERCISES: CPT

## 2024-11-05 PROCEDURE — 1123F ACP DISCUSS/DSCN MKR DOCD: CPT | Performed by: STUDENT IN AN ORGANIZED HEALTH CARE EDUCATION/TRAINING PROGRAM

## 2024-11-05 NOTE — PLAN OF CARE
Signature:__________________________________________________________  Date:  Please sign and return

## 2024-11-05 NOTE — PROGRESS NOTES
Subjective  Eh CONCHIS Bales, 73 y.o. male presents today with:  Chief Complaint   Patient presents with    Oral Swelling     Patient present today with issue with his upper center inner lip that feels sensitive to the touch and puffy for the past week.     Reports that his upper inner lip feels swollen. Was having soreness to the area about 7 days ago.   Denies any respiratory distress.     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    SPINE SURGERY  1994, 1997, 2011    [facility], Dr Hutton ([facility])    SPINE SURGERY      March 2018, May 2019, [facility]      Current Outpatient Medications   Medication Sig Dispense Refill    Handicap Placard MISC by Does not apply route (Patient taking differently: by Does not apply route Lifetime Placard   2 given to patient on 5/28/24) 1 each 0     No current facility-administered medications for this visit.     Allergies, PMH, Surgical Hx, Family Hx, and Social Hx reviewed and updated.    Objective    Vitals:    11/05/24 1623   BP: 112/62   Site: Left Upper Arm   Position: Sitting   Cuff Size: Medium Adult

## 2024-11-12 ENCOUNTER — OFFICE VISIT (OUTPATIENT)
Dept: FAMILY MEDICINE CLINIC | Age: 74
End: 2024-11-12
Payer: MEDICARE

## 2024-11-12 VITALS
HEIGHT: 66 IN | WEIGHT: 138.8 LBS | SYSTOLIC BLOOD PRESSURE: 132 MMHG | BODY MASS INDEX: 22.31 KG/M2 | DIASTOLIC BLOOD PRESSURE: 70 MMHG | TEMPERATURE: 97.5 F

## 2024-11-12 DIAGNOSIS — M75.121 NONTRAUMATIC COMPLETE TEAR OF RIGHT ROTATOR CUFF: Primary | ICD-10-CM

## 2024-11-12 DIAGNOSIS — G54.0 BRACHIAL PLEXOPATHY: ICD-10-CM

## 2024-11-12 PROCEDURE — 1123F ACP DISCUSS/DSCN MKR DOCD: CPT | Performed by: FAMILY MEDICINE

## 2024-11-12 PROCEDURE — 99213 OFFICE O/P EST LOW 20 MIN: CPT | Performed by: FAMILY MEDICINE

## 2024-11-12 NOTE — PROGRESS NOTES
Eh Bales (:  1950) is a 73 y.o. male, Established patient, here for evaluation of the following chief complaint(s):  Follow-up          Subjective   History of Present Illness  The patient presents for evaluation of right shoulder pain.  Chronic RC tear with brachial plexopathy.    He is currently experiencing pain in his right shoulder, which prevents him from sleeping on it. He has been performing exercises recommended by [facility] but notes that his muscles have atrophied over the past 6 months due to disuse. He is right-handed and is curious about the possibility of manipulating the brachial plexus.    He reports that his brachial plexus muscle appears white on the MRI, indicating a lack of blood flow. He has been advised to consult a brachial plexus specialist.    He also mentions a previous carpal tunnel surgery on his left hand, which restored full function.    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    SPINE SURGERY  , ,     [facility], Dr Hutton ([facility])    SPINE SURGERY      2018, May 2019, [facility]      Social History     Socioeconomic History

## 2024-11-13 ENCOUNTER — HOSPITAL ENCOUNTER (OUTPATIENT)
Dept: PHYSICAL THERAPY | Age: 74
Setting detail: THERAPIES SERIES
Discharge: HOME | End: 2024-11-13
Attending: FAMILY MEDICINE
Payer: MEDICARE

## 2024-11-13 PROCEDURE — 97110 THERAPEUTIC EXERCISES: CPT

## 2024-11-13 PROCEDURE — 97112 NEUROMUSCULAR REEDUCATION: CPT

## 2024-11-13 ASSESSMENT — PAIN DESCRIPTION - PAIN TYPE: TYPE: CHRONIC PAIN

## 2024-11-13 ASSESSMENT — PAIN DESCRIPTION - LOCATION: LOCATION: ARM;SHOULDER

## 2024-11-13 ASSESSMENT — PAIN SCALES - GENERAL: PAINLEVEL_OUTOF10: 10

## 2024-11-13 ASSESSMENT — PAIN DESCRIPTION - ORIENTATION: ORIENTATION: RIGHT

## 2024-11-13 NOTE — PROGRESS NOTES
session in regards to concerns and questions regarding anatomy of his condition and healing processes. Further intiiated ther ex to assist in improving R shoulder AROM and neuromscular control. Pt with trace shoulder abduction in S/L this date.  Treatment Diagnosis: R UE pain, decreased R shoulder A/PROM, decreased R UE strength decreased posture, R shoulder musculature atrophy, and (+) drop arm  Therapy Prognosis: Fair, Poor     Patient Education: [] NA  Patient Education: Extensive education on nerve anaotmy, brachial plexus, current status, nerve regeneration x20 mins    Post-Pain Assessment:       Pain Rating (0-10 pain scale):   10/10   Location and pain description same as pre-treatment unless indicated.   Action: [] NA   [x] Perform HEP  [] Meds as prescribed  [] Modalities as prescribed   [] Call Physician     GOALS   Patient Goal(s): Patient Goals : \"wake arm up\"    Long Term Goals Completed by 3-4 weeks Goal Status   LTG 1 The pt will have an increase in UEFI score >/=10 points in order to increase functional activity tolerance In progress   LTG 2 The pt will be indep/compliant with HEP in order to self-manage symptoms upon D/C In progress   LTG 3 The pt will demo improved R shoulder strength and AROM with flexion/abd >/=50* ER to ear, IR to PSIS in order to increase ease of ADL's In progress     Plan:  Frequency/Duration:  Plan  Plan Frequency: 1  Plan weeks: 3-4  Current Treatment Recommendations: Strengthening, ROM, Neuromuscular re-education, Home exercise program, Patient/Caregiver education & training, Therapeutic activities, Manual  Pt to continue current HEP.  See objective section for any therapeutic exercise changes, additions or modifications this date.    Therapy Time:      PT Individual Minutes  Time In: 1103  Time Out: 1158  Minutes: 55  Timed Code Treatment Minutes: 55 Minutes  Procedure Minutes: 0  Timed Activity Minutes Units   Ther Ex 25 2   Neuro [place] 30 2     Electronically signed by

## 2024-11-26 ENCOUNTER — HOSPITAL ENCOUNTER (OUTPATIENT)
Dept: PHYSICAL THERAPY | Age: 74
Setting detail: THERAPIES SERIES
Discharge: HOME | End: 2024-11-26
Attending: FAMILY MEDICINE
Payer: MEDICARE

## 2024-11-26 NOTE — PROGRESS NOTES
Therapy                            Cancellation/No-show Note      Date: 2024  Patient: Eh Bales (73 y.o. male)  : 1950  MRN:  [id number]  Referring Physician: Jhony Lilly DO    Medical Diagnosis: Nontraumatic complete tear of right rotator cuff [M75.121]  Brachial plexopathy [G54.0]      Visit Information:  Visits to Date 2   No Show/Cancelled Appts:       For today's appointment patient:  [x]  Cancelled  []  Rescheduled appointment  []  No-show   []  Called pt to remind of next appointment     Reason given by patient:  []  Patient ill  []  Conflicting appointment  []  No transportation    []  Conflict with work  []  No reason given  [x]  Other:  Pt presents to dept requesting cx todays appt and D/C from PT    [] Pt has future appointments scheduled, no follow up needed  [] Pt requests to be on hold.    Reason:   If > 2 weeks please discuss with therapist.  [] Therapist to call pt for follow up  [] [place] to call to re-schedule      Comments:       Signature: Electronically signed by Emeli Cameron PT on 24 at 4:52 PM EST

## 2024-11-26 NOTE — THERAPY DISCHARGE
continue with HEP.      [] Additional visits requested, Please re-certify for additional visits:     [] Hold        Signature: Electronically signed by Emeli Cameron PT on 11/26/24 at 4:54 PM EST    If you have any questions or concerns, please don't hesitate to call.  Thank you for your referral.

## 2024-12-03 ENCOUNTER — APPOINTMENT (OUTPATIENT)
Dept: NEUROLOGY | Facility: CLINIC | Age: 74
End: 2024-12-03
Payer: MEDICARE

## 2025-01-06 ENCOUNTER — OFFICE VISIT (OUTPATIENT)
Age: 75
End: 2025-01-06
Payer: MEDICARE

## 2025-01-06 VITALS
HEIGHT: 66 IN | HEART RATE: 81 BPM | WEIGHT: 135.2 LBS | BODY MASS INDEX: 21.73 KG/M2 | SYSTOLIC BLOOD PRESSURE: 122 MMHG | OXYGEN SATURATION: 98 % | DIASTOLIC BLOOD PRESSURE: 78 MMHG | TEMPERATURE: 98.4 F

## 2025-01-06 DIAGNOSIS — R91.1 PULMONARY NODULE: ICD-10-CM

## 2025-01-06 DIAGNOSIS — F33.1 MODERATE RECURRENT MAJOR DEPRESSION (HCC): ICD-10-CM

## 2025-01-06 DIAGNOSIS — F17.210 CIGARETTE NICOTINE DEPENDENCE WITHOUT COMPLICATION: ICD-10-CM

## 2025-01-06 DIAGNOSIS — G89.29 INSOMNIA SECONDARY TO CHRONIC PAIN: Primary | ICD-10-CM

## 2025-01-06 DIAGNOSIS — G47.01 INSOMNIA SECONDARY TO CHRONIC PAIN: Primary | ICD-10-CM

## 2025-01-06 DIAGNOSIS — G54.0 BRACHIAL PLEXOPATHY: ICD-10-CM

## 2025-01-06 DIAGNOSIS — M75.121 NONTRAUMATIC COMPLETE TEAR OF RIGHT ROTATOR CUFF: ICD-10-CM

## 2025-01-06 PROCEDURE — 1160F RVW MEDS BY RX/DR IN RCRD: CPT | Performed by: FAMILY MEDICINE

## 2025-01-06 PROCEDURE — 1159F MED LIST DOCD IN RCRD: CPT | Performed by: FAMILY MEDICINE

## 2025-01-06 PROCEDURE — 99214 OFFICE O/P EST MOD 30 MIN: CPT | Performed by: FAMILY MEDICINE

## 2025-01-06 PROCEDURE — 1123F ACP DISCUSS/DSCN MKR DOCD: CPT | Performed by: FAMILY MEDICINE

## 2025-01-06 SDOH — ECONOMIC STABILITY: FOOD INSECURITY: WITHIN THE PAST 12 MONTHS, THE FOOD YOU BOUGHT JUST DIDN'T LAST AND YOU DIDN'T HAVE MONEY TO GET MORE.: OFTEN TRUE

## 2025-01-06 SDOH — ECONOMIC STABILITY: INCOME INSECURITY: HOW HARD IS IT FOR YOU TO PAY FOR THE VERY BASICS LIKE FOOD, HOUSING, MEDICAL CARE, AND HEATING?: SOMEWHAT HARD

## 2025-01-06 SDOH — ECONOMIC STABILITY: FOOD INSECURITY: WITHIN THE PAST 12 MONTHS, YOU WORRIED THAT YOUR FOOD WOULD RUN OUT BEFORE YOU GOT MONEY TO BUY MORE.: OFTEN TRUE

## 2025-01-06 ASSESSMENT — PATIENT HEALTH QUESTIONNAIRE - PHQ9
SUM OF ALL RESPONSES TO PHQ QUESTIONS 1-9: 21
SUM OF ALL RESPONSES TO PHQ QUESTIONS 1-9: 21
2. FEELING DOWN, DEPRESSED OR HOPELESS: NEARLY EVERY DAY
SUM OF ALL RESPONSES TO PHQ QUESTIONS 1-9: 21
SUM OF ALL RESPONSES TO PHQ9 QUESTIONS 1 & 2: 6
6. FEELING BAD ABOUT YOURSELF - OR THAT YOU ARE A FAILURE OR HAVE LET YOURSELF OR YOUR FAMILY DOWN: NEARLY EVERY DAY
10. IF YOU CHECKED OFF ANY PROBLEMS, HOW DIFFICULT HAVE THESE PROBLEMS MADE IT FOR YOU TO DO YOUR WORK, TAKE CARE OF THINGS AT HOME, OR GET ALONG WITH OTHER PEOPLE: EXTREMELY DIFFICULT
SUM OF ALL RESPONSES TO PHQ QUESTIONS 1-9: 21
4. FEELING TIRED OR HAVING LITTLE ENERGY: NEARLY EVERY DAY
1. LITTLE INTEREST OR PLEASURE IN DOING THINGS: NEARLY EVERY DAY
8. MOVING OR SPEAKING SO SLOWLY THAT OTHER PEOPLE COULD HAVE NOTICED. OR THE OPPOSITE, BEING SO FIGETY OR RESTLESS THAT YOU HAVE BEEN MOVING AROUND A LOT MORE THAN USUAL: NEARLY EVERY DAY
7. TROUBLE CONCENTRATING ON THINGS, SUCH AS READING THE NEWSPAPER OR WATCHING TELEVISION: NEARLY EVERY DAY
9. THOUGHTS THAT YOU WOULD BE BETTER OFF DEAD, OR OF HURTING YOURSELF: NOT AT ALL
5. POOR APPETITE OR OVEREATING: NOT AT ALL
3. TROUBLE FALLING OR STAYING ASLEEP: NEARLY EVERY DAY

## 2025-01-06 ASSESSMENT — COLUMBIA-SUICIDE SEVERITY RATING SCALE - C-SSRS
6. HAVE YOU EVER DONE ANYTHING, STARTED TO DO ANYTHING, OR PREPARED TO DO ANYTHING TO END YOUR LIFE?: NO
2. HAVE YOU ACTUALLY HAD ANY THOUGHTS OF KILLING YOURSELF?: NO
1. WITHIN THE PAST MONTH, HAVE YOU WISHED YOU WERE DEAD OR WISHED YOU COULD GO TO SLEEP AND NOT WAKE UP?: NO

## 2025-01-06 NOTE — PATIENT INSTRUCTIONS
[employer]*  (Call [employer]/211 if need more resources.)        MEDICAL:    [facility]   What they offer: [employer] discounts fees for qualifying insured and uninsured persons.  We can assist you in signing up for Medicaid, Medicare, and Marketplace Exchange insurance plans.  They offer 4 locations in [place], 2 locations in [place] and 1 in [facility].    Phone Number: [phone]  Website: [email or web address]    [facility]:  What they offer: We provide health care services to the uninsured and underinsured. From providing quality care to connecting patients to critical community resources, our patients and their needs are our highest priority.  Phone number: 350.536.6411  Website: https://[employer]   [facility] Financial Assistance:  What they offer: Assistance with medical bills  Phone number: 1-981.534.5172 option 5    UTILITY:         [facility]/211:  What they offer: Help find lower cost options for phone or internet as well as help paying utility bills.   Phone Number: 211  Website: https://[employer]/get-help/utilities-expenses   [employer]  What they offer:  Assistance with utilities  Phone:  287.671.6901    [facility]   What they Offer: Assistance with utilities  Phone: 786.402.9573  Website: https://www.[employer]/    [facility] [place]  What they Offer: Assistance with utilities  Phone: 231.821.1332  Website: [email or web address]/    MEDICATIONS:   [facility] [employer]  What they offer: [employer] tracks prescription drug prices and provides free drug coupons for discounts on medications.  Website: https://www.[employer]/  [employer]:  What they offer: [employer] offers free information on medications and healthcare cost savings programs including prescription assistance programs, coupons, and discount programs.  Helpline: 308.535.2484  Website: https://www.[employer]Maze[employer]olt [employer]eds.org  [employer] Assist:  What they offer: Medication

## 2025-01-06 NOTE — PROGRESS NOTES
Eh Bales (:  1950) is a 74 y.o. male, Established patient, here for evaluation of the following chief complaint(s):  Follow-up (Nontraumatic complete tear of right rotator cuff - Patient has follow up appointment with ortho surgery on . ) and Other (Follow up - Patient states he had an accident in the hospital during stay in May. Patient states staff hyperextended arm and ripped Rolator cuff and was not reported. )          Subjective   History of Present Illness  The patient is a 74-year-old male who presents for evaluation of brachial plexus injury, sleep disturbance, pulmonary nodule, and smoking cessation.    He has been experiencing progressive worsening of his brachial plexus injury, despite undergoing physical therapy. He discontinued the therapy 4 weeks ago after only 2 sessions due to perceived ineffectiveness. He reports minimal movement in his right arm and has noticed a decrease in muscle mass. He is scheduled to see a specialist on 2025 at 10:00 AM. He expresses reluctance towards shoulder replacement surgery. He recalls an incident at the hospital where he believes his rotator cuff was torn, leading to his current condition. He continues to require significant assistance with daily activities.    His sleep has been disrupted since 2024, which he attributes to his arthritis. He recalls a previous medication prescribed by Dr. Chase that effectively managed his sleep issues without causing grogginess, and he plans to provide the name of this medication.    He also reports shallow breathing and a history of lung nodule, for which he was under the care of Dr. Marcos. He has not followed up with annual screenings as recommended.    He has a smoking history from  to , resumed in , and currently smokes half a pack per day.    SOCIAL HISTORY  The patient admits to smoking half a pack a day.    Past Medical History:   Diagnosis Date    Complete traumatic

## 2025-01-17 ENCOUNTER — HOSPITAL ENCOUNTER (OUTPATIENT)
Dept: CT IMAGING | Age: 75
Discharge: HOME | End: 2025-01-19
Attending: FAMILY MEDICINE
Payer: MEDICARE

## 2025-01-17 DIAGNOSIS — F17.210 CIGARETTE NICOTINE DEPENDENCE WITHOUT COMPLICATION: ICD-10-CM

## 2025-01-17 PROCEDURE — 71271 CT THORAX LUNG CANCER SCR C-: CPT

## 2025-02-04 ENCOUNTER — APPOINTMENT (OUTPATIENT)
Dept: NEUROLOGY | Facility: CLINIC | Age: 75
End: 2025-02-04
Payer: MEDICARE

## 2025-03-19 ENCOUNTER — OFFICE VISIT (OUTPATIENT)
Age: 75
End: 2025-03-19
Payer: MEDICARE

## 2025-03-19 VITALS
BODY MASS INDEX: 21.73 KG/M2 | OXYGEN SATURATION: 97 % | TEMPERATURE: 98.4 F | HEART RATE: 94 BPM | SYSTOLIC BLOOD PRESSURE: 122 MMHG | WEIGHT: 135.2 LBS | DIASTOLIC BLOOD PRESSURE: 64 MMHG | HEIGHT: 66 IN

## 2025-03-19 DIAGNOSIS — F51.01 PRIMARY INSOMNIA: ICD-10-CM

## 2025-03-19 DIAGNOSIS — M85.811 OSTEOPENIA OF RIGHT SHOULDER: ICD-10-CM

## 2025-03-19 DIAGNOSIS — M25.532 LEFT WRIST PAIN: Primary | ICD-10-CM

## 2025-03-19 DIAGNOSIS — J43.2 CENTRILOBULAR EMPHYSEMA (HCC): ICD-10-CM

## 2025-03-19 DIAGNOSIS — G54.0 BRACHIAL PLEXOPATHY: ICD-10-CM

## 2025-03-19 PROCEDURE — 1123F ACP DISCUSS/DSCN MKR DOCD: CPT | Performed by: FAMILY MEDICINE

## 2025-03-19 PROCEDURE — 1160F RVW MEDS BY RX/DR IN RCRD: CPT | Performed by: FAMILY MEDICINE

## 2025-03-19 PROCEDURE — 99213 OFFICE O/P EST LOW 20 MIN: CPT | Performed by: FAMILY MEDICINE

## 2025-03-19 PROCEDURE — 1159F MED LIST DOCD IN RCRD: CPT | Performed by: FAMILY MEDICINE

## 2025-03-19 RX ORDER — DOXEPIN HYDROCHLORIDE 10 MG/1
10 CAPSULE ORAL NIGHTLY
Qty: 30 CAPSULE | Refills: 3
Start: 2025-03-19

## 2025-03-19 RX ORDER — TRIAMCINOLONE ACETONIDE 1 MG/G
CREAM TOPICAL
COMMUNITY
Start: 2025-03-19

## 2025-03-19 SDOH — ECONOMIC STABILITY: FOOD INSECURITY: WITHIN THE PAST 12 MONTHS, THE FOOD YOU BOUGHT JUST DIDN'T LAST AND YOU DIDN'T HAVE MONEY TO GET MORE.: NEVER TRUE

## 2025-03-19 SDOH — ECONOMIC STABILITY: FOOD INSECURITY: WITHIN THE PAST 12 MONTHS, YOU WORRIED THAT YOUR FOOD WOULD RUN OUT BEFORE YOU GOT MONEY TO BUY MORE.: NEVER TRUE

## 2025-03-19 NOTE — PROGRESS NOTES
3.2 oz), SpO2 97%.  Physical Exam  Constitutional:       Appearance: Normal appearance.   Musculoskeletal:         General: Tenderness (first MCP on flexor surface) present.   Neurological:      Mental Status: He is alert.          Results         Assessment & Plan  1. Wrist pain.  The patient reports wrist pain following a fall on ice 3 weeks ago. He has been using a ProCare brace but still experiences significant pain, especially when bending the wrist. There is tenderness around the thumb area. An x-ray of the wrist has been ordered to evaluate for any potential fractures or other issues.  1. Left wrist pain  -     XR WRIST LEFT (MIN 3 VIEWS); Future  2. Centrilobular emphysema (HCC) - stable on current regimen  3. Brachial plexopathy - he is seeing a specialist to see if surgical procedure can help       No follow-ups on file.    The patient (or guardian, if applicable) and other individuals in attendance with the patient were advised that Artificial Intelligence will be utilized during this visit to record, process the conversation to generate a clinical note and to support improvement of the AI technology. The patient (or guardian, if applicable) and other individuals in attendance at the appointment consented to the use of AI, including the recording.      An electronic signature was used to authenticate this note.    --SHAHEEN WOODRUFF, DO

## 2025-03-24 ENCOUNTER — RESULTS FOLLOW-UP (OUTPATIENT)
Age: 75
End: 2025-03-24

## 2025-03-24 ENCOUNTER — TELEPHONE (OUTPATIENT)
Age: 75
End: 2025-03-24

## 2025-03-24 ENCOUNTER — HOSPITAL ENCOUNTER (OUTPATIENT)
Dept: WOMENS IMAGING | Age: 75
Discharge: HOME | End: 2025-03-26
Payer: MEDICARE

## 2025-03-24 DIAGNOSIS — M85.811 OSTEOPENIA OF RIGHT SHOULDER: ICD-10-CM

## 2025-03-24 PROCEDURE — 77080 DXA BONE DENSITY AXIAL: CPT

## 2025-03-24 NOTE — TELEPHONE ENCOUNTER
Patient calling in requesting xray results from 3/19 on left wrist. Not released.  He wanted to know if broke - I relayed   no acute fracture or dislocation of the left wrist.     Bad phone connection and he hung up    *Once released can you please give him a call explaining results

## 2025-03-25 NOTE — TELEPHONE ENCOUNTER
Patient called in and message was given about the negative xray result.  Patient wants to know what is wrong with his left wrist.  \"Why does it still hurt?\"  \"I only have 25% use of left hand\"  \"Have him call me\"  I did offer to get the patient in before his 4/7/25 appt-patient declined sooner appt.   He is also asking about the EMG results.  I did not have results to give him on that.

## 2025-03-26 ENCOUNTER — RESULTS FOLLOW-UP (OUTPATIENT)
Age: 75
End: 2025-03-26

## 2025-04-01 NOTE — TELEPHONE ENCOUNTER
Patient called in stating he wants to investigate further on his wrist. Would like an MRI or CT Scan.    States its in a lot of pain. Movement is bad. States that if he isn't wearing a brace his hand just flops.    Is also asking if Dr. Lilly can order a Procare brace for his wrist.

## 2025-04-03 DIAGNOSIS — M25.532 LEFT WRIST PAIN: Primary | ICD-10-CM

## 2025-04-03 DIAGNOSIS — R29.898 WEAKNESS OF WRIST: ICD-10-CM

## 2025-04-07 ENCOUNTER — HOSPITAL ENCOUNTER (OUTPATIENT)
Dept: CT IMAGING | Age: 75
Discharge: HOME | End: 2025-04-09
Payer: MEDICARE

## 2025-04-07 ENCOUNTER — OFFICE VISIT (OUTPATIENT)
Age: 75
End: 2025-04-07
Payer: MEDICARE

## 2025-04-07 VITALS
DIASTOLIC BLOOD PRESSURE: 62 MMHG | BODY MASS INDEX: 22.5 KG/M2 | SYSTOLIC BLOOD PRESSURE: 120 MMHG | HEIGHT: 66 IN | TEMPERATURE: 98 F | WEIGHT: 140 LBS | HEART RATE: 85 BPM | OXYGEN SATURATION: 98 %

## 2025-04-07 DIAGNOSIS — E78.2 MIXED HYPERLIPIDEMIA: ICD-10-CM

## 2025-04-07 DIAGNOSIS — G47.01 INSOMNIA SECONDARY TO CHRONIC PAIN: ICD-10-CM

## 2025-04-07 DIAGNOSIS — M25.532 LEFT WRIST PAIN: ICD-10-CM

## 2025-04-07 DIAGNOSIS — R29.898 WEAKNESS OF WRIST: ICD-10-CM

## 2025-04-07 DIAGNOSIS — F33.1 MODERATE RECURRENT MAJOR DEPRESSION (HCC): Primary | ICD-10-CM

## 2025-04-07 DIAGNOSIS — G89.29 INSOMNIA SECONDARY TO CHRONIC PAIN: ICD-10-CM

## 2025-04-07 DIAGNOSIS — Z12.5 SCREENING FOR MALIGNANT NEOPLASM OF PROSTATE: ICD-10-CM

## 2025-04-07 PROCEDURE — 1160F RVW MEDS BY RX/DR IN RCRD: CPT | Performed by: FAMILY MEDICINE

## 2025-04-07 PROCEDURE — 73200 CT UPPER EXTREMITY W/O DYE: CPT

## 2025-04-07 PROCEDURE — 99214 OFFICE O/P EST MOD 30 MIN: CPT | Performed by: FAMILY MEDICINE

## 2025-04-07 PROCEDURE — 1159F MED LIST DOCD IN RCRD: CPT | Performed by: FAMILY MEDICINE

## 2025-04-07 PROCEDURE — 1123F ACP DISCUSS/DSCN MKR DOCD: CPT | Performed by: FAMILY MEDICINE

## 2025-04-07 NOTE — PROGRESS NOTES
patient were advised that Artificial Intelligence will be utilized during this visit to record, process the conversation to generate a clinical note and to support improvement of the AI technology. The patient (or guardian, if applicable) and other individuals in attendance at the appointment consented to the use of AI, including the recording.      An electronic signature was used to authenticate this note.    --SHAHEEN WOODRUFF, DO

## 2025-04-11 ENCOUNTER — TELEPHONE (OUTPATIENT)
Age: 75
End: 2025-04-11

## 2025-04-11 NOTE — TELEPHONE ENCOUNTER
Patient called back for results.    Relayed results to patient as written by Dr. Lilly.     Patient is inquiring on what he can do for the pain and if there is anything natural/non lethal he can take for the cartilage.    Please advise.

## 2025-04-16 NOTE — TELEPHONE ENCOUNTER
Patient would like to know if PCP looked at the joint after fall on ice. (CT scan reanalyzed). Patient would like PCP to be aware of upcoming appt today 4.16.25 for brachial plexus - removing nerve from muscles on right arm and replace into shoulder.

## 2025-04-17 ENCOUNTER — TELEPHONE (OUTPATIENT)
Age: 75
End: 2025-04-17

## 2025-04-17 DIAGNOSIS — M25.532 LEFT WRIST PAIN: Primary | ICD-10-CM

## 2025-04-17 NOTE — TELEPHONE ENCOUNTER
1st attempt to reach patient. Called patient at [phone] and left message for patient to call office back at [phone].

## 2025-04-17 NOTE — TELEPHONE ENCOUNTER
Patient called and asked for an appt with Dr Lilly  First appt available was 4/23/25   patient took that appt but wanted the dr to know he will be asking for an injection in his left wrist.   Said he 'would even do a drive by and stick his hand out the window for you to treat!'   Linda

## 2025-04-18 ENCOUNTER — TELEPHONE (OUTPATIENT)
Age: 75
End: 2025-04-18

## 2025-04-18 NOTE — TELEPHONE ENCOUNTER
Pt called in asking the provider to look over [facility]     Dr. Dexter     He is having trouble with nerves in C4 C5 brachial plexus, Dr. Dexter does not believe he is a good candidate for surgery to relocate the nerve.  Dr. Dexter recommends he see a neuro surgeon for his neck. But the pt declined    He states his head was stretched severely to the left shoulder and that created the brachial plexus.     Pt thinks he needs a new MRI on cervical with contrast to see what is wrong with 4 and 5     He states he has full function of his right arm      Patient is requesting a call back from Dr. Lilly

## 2025-04-18 NOTE — TELEPHONE ENCOUNTER
Patient returned call and was given provider's message. He said he would like a referral to the hand specialist.

## 2025-04-22 ENCOUNTER — OFFICE VISIT (OUTPATIENT)
Age: 75
End: 2025-04-22
Payer: MEDICARE

## 2025-04-22 VITALS
HEART RATE: 81 BPM | SYSTOLIC BLOOD PRESSURE: 104 MMHG | BODY MASS INDEX: 22.5 KG/M2 | HEIGHT: 66 IN | DIASTOLIC BLOOD PRESSURE: 64 MMHG | WEIGHT: 140 LBS

## 2025-04-22 DIAGNOSIS — M18.12 ARTHRITIS OF CARPOMETACARPAL (CMC) JOINT OF LEFT THUMB: Primary | ICD-10-CM

## 2025-04-22 PROCEDURE — 1123F ACP DISCUSS/DSCN MKR DOCD: CPT | Performed by: STUDENT IN AN ORGANIZED HEALTH CARE EDUCATION/TRAINING PROGRAM

## 2025-04-22 PROCEDURE — 20600 DRAIN/INJ JOINT/BURSA W/O US: CPT | Performed by: STUDENT IN AN ORGANIZED HEALTH CARE EDUCATION/TRAINING PROGRAM

## 2025-04-22 PROCEDURE — 99204 OFFICE O/P NEW MOD 45 MIN: CPT | Performed by: STUDENT IN AN ORGANIZED HEALTH CARE EDUCATION/TRAINING PROGRAM

## 2025-04-22 PROCEDURE — 1159F MED LIST DOCD IN RCRD: CPT | Performed by: STUDENT IN AN ORGANIZED HEALTH CARE EDUCATION/TRAINING PROGRAM

## 2025-04-22 PROCEDURE — 1125F AMNT PAIN NOTED PAIN PRSNT: CPT | Performed by: STUDENT IN AN ORGANIZED HEALTH CARE EDUCATION/TRAINING PROGRAM

## 2025-04-22 RX ORDER — LIDOCAINE HYDROCHLORIDE 10 MG/ML
0.5 INJECTION, SOLUTION INFILTRATION; PERINEURAL ONCE
Status: COMPLETED | OUTPATIENT
Start: 2025-04-22 | End: 2025-04-22

## 2025-04-22 RX ORDER — BETAMETHASONE SODIUM PHOSPHATE AND BETAMETHASONE ACETATE 3; 3 MG/ML; MG/ML
3 INJECTION, SUSPENSION INTRA-ARTICULAR; INTRALESIONAL; INTRAMUSCULAR; SOFT TISSUE ONCE
Status: COMPLETED | OUTPATIENT
Start: 2025-04-22 | End: 2025-04-22

## 2025-04-22 RX ADMIN — LIDOCAINE HYDROCHLORIDE 0.5 ML: 10 INJECTION, SOLUTION INFILTRATION; PERINEURAL at 09:52

## 2025-04-22 RX ADMIN — BETAMETHASONE SODIUM PHOSPHATE AND BETAMETHASONE ACETATE 3 MG: 3; 3 INJECTION, SUSPENSION INTRA-ARTICULAR; INTRALESIONAL; INTRAMUSCULAR; SOFT TISSUE at 09:53

## 2025-04-22 NOTE — PROGRESS NOTES
mg    lidocaine 1 % injection 0.5 mL        74-year-old male with history of a fall approximately couple months ago with exacerbation of left wrist/base of thumb pain.  Advanced imaging including CT scan was consistent with advanced thumb CMC arthritis.  We discussed the natural history and options for treatment including observation, activity modification, splinting, use of anti inflammatories to treat the pain and inflammation, steroid injection, and surgery. Surgical management is indicated when patients experience persistent symptoms that are not controlled by nonoperative management. In this case I have recommended splinting, antinflammatories, and steroid injection.  The patient wishes to follow these recommendations. The patient was given a DME splint.  I will see the patient back as needed if symptoms fail to improve or worsen.  If the injection is helpful but wears off over time we can consider repeating this in about 3 months.  He was instructed call any questions or concerns.    Indication: Basal joint arthritis left wrist    Procedure risk factors were discussed: risks discussed included but not limited to injection site pain, infection, inflammation, among others, and decision was made for procedure.    Procedure: injection of left thumb basal joint    The patient´s hand was sterilely prepped and injected over the prominent source of pain over the left thumb basal joint from a dorsal approach with 0.5 cc of 1% lidocaine and 0.5 cc 3 mg of Betamethasone. Procedure was tolerated well without complications.  A bandaid was applied, and the patient was given instructions in rest, judicious use of ice, and follow-up.    The problem is a chronic problem with some progression, minor procedure risk factors were discussed: risks discussed included injection site pain, infection, inflammation and decision was made for procedure.      Jan Dalton MD  Orthopedic Surgery

## 2025-04-23 ENCOUNTER — OFFICE VISIT (OUTPATIENT)
Age: 75
End: 2025-04-23
Payer: MEDICARE

## 2025-04-23 VITALS
TEMPERATURE: 98.7 F | HEART RATE: 86 BPM | WEIGHT: 138.4 LBS | HEIGHT: 66 IN | BODY MASS INDEX: 22.24 KG/M2 | OXYGEN SATURATION: 98 % | SYSTOLIC BLOOD PRESSURE: 118 MMHG | DIASTOLIC BLOOD PRESSURE: 74 MMHG

## 2025-04-23 DIAGNOSIS — M25.532 LEFT WRIST PAIN: Primary | ICD-10-CM

## 2025-04-23 DIAGNOSIS — G54.0 BRACHIAL PLEXOPATHY: ICD-10-CM

## 2025-04-23 DIAGNOSIS — M48.02 CERVICAL SPINAL STENOSIS: ICD-10-CM

## 2025-04-23 DIAGNOSIS — M47.22 OTHER SPONDYLOSIS WITH RADICULOPATHY, CERVICAL REGION: ICD-10-CM

## 2025-04-23 DIAGNOSIS — M75.121 NONTRAUMATIC COMPLETE TEAR OF RIGHT ROTATOR CUFF: ICD-10-CM

## 2025-04-23 PROCEDURE — 1159F MED LIST DOCD IN RCRD: CPT | Performed by: FAMILY MEDICINE

## 2025-04-23 PROCEDURE — 1160F RVW MEDS BY RX/DR IN RCRD: CPT | Performed by: FAMILY MEDICINE

## 2025-04-23 PROCEDURE — 99213 OFFICE O/P EST LOW 20 MIN: CPT | Performed by: FAMILY MEDICINE

## 2025-04-23 PROCEDURE — 1123F ACP DISCUSS/DSCN MKR DOCD: CPT | Performed by: FAMILY MEDICINE

## 2025-04-23 NOTE — PROGRESS NOTES
twice daily as needed. No longer than 2 weeks in a row. 3/19/25  Yes Provider, MD Mary   doxepin (SINEQUAN) 10 MG capsule Take 1 capsule by mouth nightly 3/19/25  Yes Jhony Lilly DO   Handicap Placard MISC by Does not apply route  Patient taking differently: by Does not apply route Lifetime Placard   2 given to patient on 5/28/24 1/2/24  Yes Jhony Lilly DO              Objective   Blood pressure 118/74, pulse 86, temperature 98.7 °F (37.1 °C), temperature source Temporal, height 1.676 m (5' 5.98\"), weight 62.8 kg (138 lb 6.4 oz), SpO2 98%.  Physical Exam     Results         Assessment & Plan  1. Right shoulder pain.  - Full-thickness rotator cuff tear and moderate muscle atrophy in the right shoulder.  - Concern for a brachial plexus nerve injury on EMG, which might delay surgical intervention.  - Conservative and surgical treatment options discussed, including right shoulder arthroplasty, arthroscopic rotator cuff repair, and right reverse total shoulder replacement.  - Patient declined cortisone injection, will continue formal therapy, and will contact the office for an appointment with Dr. Fowler to further discuss surgical treatment options. A handicap placard will be provided today.  1. Left wrist pain  2. Brachial plexopathy  3. Nontraumatic complete tear of right rotator cuff  4. Cervical spinal stenosis  -     Handicap Placard MISC; Starting Wed 4/23/2025, Disp-1 each, R-0, Print  5. Other spondylosis with radiculopathy, cervical region  -     Handicap Placard MISC; Starting Wed 4/23/2025, Disp-1 each, R-0, Print       No follow-ups on file.    The patient (or guardian, if applicable) and other individuals in attendance with the patient were advised that Artificial Intelligence will be utilized during this visit to record, process the conversation to generate a clinical note and to support improvement of the AI technology. The patient (or guardian, if applicable) and other individuals in

## 2025-04-28 DIAGNOSIS — Z12.5 SCREENING FOR MALIGNANT NEOPLASM OF PROSTATE: ICD-10-CM

## 2025-04-28 DIAGNOSIS — F33.1 MODERATE RECURRENT MAJOR DEPRESSION (HCC): ICD-10-CM

## 2025-04-28 DIAGNOSIS — E78.2 MIXED HYPERLIPIDEMIA: ICD-10-CM

## 2025-04-28 DIAGNOSIS — G89.29 INSOMNIA SECONDARY TO CHRONIC PAIN: ICD-10-CM

## 2025-04-28 DIAGNOSIS — G47.01 INSOMNIA SECONDARY TO CHRONIC PAIN: ICD-10-CM

## 2025-04-28 LAB
ALBUMIN SERPL-MCNC: 4.2 G/DL (ref 3.5–4.6)
ALP SERPL-CCNC: 76 U/L (ref 35–104)
ALT SERPL-CCNC: 17 U/L (ref 0–41)
ANION GAP SERPL CALCULATED.3IONS-SCNC: 10 MEQ/L (ref 9–15)
AST SERPL-CCNC: 23 U/L (ref 0–40)
BASOPHILS # BLD: 0 K/UL (ref 0–0.2)
BASOPHILS NFR BLD: 0.4 %
BILIRUB SERPL-MCNC: 0.5 MG/DL (ref 0.2–0.7)
BUN SERPL-MCNC: 19 MG/DL (ref 8–23)
CALCIUM SERPL-MCNC: 9 MG/DL (ref 8.5–9.9)
CHLORIDE SERPL-SCNC: 100 MEQ/L (ref 95–107)
CHOLEST SERPL-MCNC: 187 MG/DL (ref 0–199)
CO2 SERPL-SCNC: 27 MEQ/L (ref 20–31)
CREAT SERPL-MCNC: 0.83 MG/DL (ref 0.7–1.2)
EOSINOPHIL # BLD: 0.4 K/UL (ref 0–0.7)
EOSINOPHIL NFR BLD: 4.8 %
ERYTHROCYTE [DISTWIDTH] IN BLOOD BY AUTOMATED COUNT: 12.2 % (ref 11.5–14.5)
GLOBULIN SER CALC-MCNC: 2.8 G/DL (ref 2.3–3.5)
GLUCOSE SERPL-MCNC: 87 MG/DL (ref 70–99)
HCT VFR BLD AUTO: 46.4 % (ref 42–52)
HDLC SERPL-MCNC: 79 MG/DL (ref 40–59)
HGB BLD-MCNC: 15.4 G/DL (ref 14–18)
LDL CHOLESTEROL: 98 MG/DL (ref 0–129)
LYMPHOCYTES # BLD: 1.9 K/UL (ref 1–4.8)
LYMPHOCYTES NFR BLD: 26 %
MCH RBC QN AUTO: 31.7 PG (ref 27–31.3)
MCHC RBC AUTO-ENTMCNC: 33.2 % (ref 33–37)
MCV RBC AUTO: 95.5 FL (ref 79–92.2)
MONOCYTES # BLD: 0.6 K/UL (ref 0.2–0.8)
MONOCYTES NFR BLD: 7.6 %
NEUTROPHILS # BLD: 4.4 K/UL (ref 1.4–6.5)
NEUTS SEG NFR BLD: 60.9 %
PLATELET # BLD AUTO: 372 K/UL (ref 130–400)
POTASSIUM SERPL-SCNC: 4.6 MEQ/L (ref 3.4–4.9)
PROT SERPL-MCNC: 7 G/DL (ref 6.3–8)
PSA SERPL-MCNC: 1.53 NG/ML (ref 0–4)
RBC # BLD AUTO: 4.86 M/UL (ref 4.7–6.1)
SODIUM SERPL-SCNC: 137 MEQ/L (ref 135–144)
TRIGLYCERIDE, FASTING: 48 MG/DL (ref 0–150)
WBC # BLD AUTO: 7.3 K/UL (ref 4.8–10.8)

## 2025-04-29 ENCOUNTER — RESULTS FOLLOW-UP (OUTPATIENT)
Age: 75
End: 2025-04-29

## 2025-07-07 ENCOUNTER — OFFICE VISIT (OUTPATIENT)
Age: 75
End: 2025-07-07
Payer: MEDICARE

## 2025-07-07 ENCOUNTER — TELEPHONE (OUTPATIENT)
Age: 75
End: 2025-07-07

## 2025-07-07 VITALS
OXYGEN SATURATION: 96 % | SYSTOLIC BLOOD PRESSURE: 118 MMHG | HEART RATE: 86 BPM | WEIGHT: 136 LBS | BODY MASS INDEX: 21.86 KG/M2 | DIASTOLIC BLOOD PRESSURE: 68 MMHG | HEIGHT: 66 IN | TEMPERATURE: 97.6 F

## 2025-07-07 DIAGNOSIS — F33.1 MODERATE RECURRENT MAJOR DEPRESSION (HCC): ICD-10-CM

## 2025-07-07 DIAGNOSIS — G54.0 BRACHIAL PLEXOPATHY: ICD-10-CM

## 2025-07-07 DIAGNOSIS — M75.121 NONTRAUMATIC COMPLETE TEAR OF RIGHT ROTATOR CUFF: ICD-10-CM

## 2025-07-07 DIAGNOSIS — Z00.00 MEDICARE ANNUAL WELLNESS VISIT, SUBSEQUENT: Primary | ICD-10-CM

## 2025-07-07 DIAGNOSIS — G47.01 INSOMNIA SECONDARY TO CHRONIC PAIN: ICD-10-CM

## 2025-07-07 DIAGNOSIS — G89.29 INSOMNIA SECONDARY TO CHRONIC PAIN: ICD-10-CM

## 2025-07-07 PROCEDURE — G0439 PPPS, SUBSEQ VISIT: HCPCS | Performed by: FAMILY MEDICINE

## 2025-07-07 PROCEDURE — 99213 OFFICE O/P EST LOW 20 MIN: CPT | Performed by: FAMILY MEDICINE

## 2025-07-07 PROCEDURE — 1123F ACP DISCUSS/DSCN MKR DOCD: CPT | Performed by: FAMILY MEDICINE

## 2025-07-07 PROCEDURE — 1159F MED LIST DOCD IN RCRD: CPT | Performed by: FAMILY MEDICINE

## 2025-07-07 PROCEDURE — 1160F RVW MEDS BY RX/DR IN RCRD: CPT | Performed by: FAMILY MEDICINE

## 2025-07-07 ASSESSMENT — COLUMBIA-SUICIDE SEVERITY RATING SCALE - C-SSRS
4. HAVE YOU HAD THESE THOUGHTS AND HAD SOME INTENTION OF ACTING ON THEM?: NO
2. HAVE YOU ACTUALLY HAD ANY THOUGHTS OF KILLING YOURSELF?: YES
6. HAVE YOU EVER DONE ANYTHING, STARTED TO DO ANYTHING, OR PREPARED TO DO ANYTHING TO END YOUR LIFE?: NO
5. HAVE YOU STARTED TO WORK OUT OR WORKED OUT THE DETAILS OF HOW TO KILL YOURSELF? DO YOU INTEND TO CARRY OUT THIS PLAN?: NO
3. HAVE YOU BEEN THINKING ABOUT HOW YOU MIGHT KILL YOURSELF?: NO
1. WITHIN THE PAST MONTH, HAVE YOU WISHED YOU WERE DEAD OR WISHED YOU COULD GO TO SLEEP AND NOT WAKE UP?: YES

## 2025-07-07 ASSESSMENT — PATIENT HEALTH QUESTIONNAIRE - PHQ9
4. FEELING TIRED OR HAVING LITTLE ENERGY: SEVERAL DAYS
10. IF YOU CHECKED OFF ANY PROBLEMS, HOW DIFFICULT HAVE THESE PROBLEMS MADE IT FOR YOU TO DO YOUR WORK, TAKE CARE OF THINGS AT HOME, OR GET ALONG WITH OTHER PEOPLE: SOMEWHAT DIFFICULT
SUM OF ALL RESPONSES TO PHQ QUESTIONS 1-9: 7
8. MOVING OR SPEAKING SO SLOWLY THAT OTHER PEOPLE COULD HAVE NOTICED. OR THE OPPOSITE, BEING SO FIGETY OR RESTLESS THAT YOU HAVE BEEN MOVING AROUND A LOT MORE THAN USUAL: NOT AT ALL
5. POOR APPETITE OR OVEREATING: SEVERAL DAYS
SUM OF ALL RESPONSES TO PHQ QUESTIONS 1-9: 7
6. FEELING BAD ABOUT YOURSELF - OR THAT YOU ARE A FAILURE OR HAVE LET YOURSELF OR YOUR FAMILY DOWN: SEVERAL DAYS
1. LITTLE INTEREST OR PLEASURE IN DOING THINGS: NOT AT ALL
SUM OF ALL RESPONSES TO PHQ QUESTIONS 1-9: 6
7. TROUBLE CONCENTRATING ON THINGS, SUCH AS READING THE NEWSPAPER OR WATCHING TELEVISION: SEVERAL DAYS
9. THOUGHTS THAT YOU WOULD BE BETTER OFF DEAD, OR OF HURTING YOURSELF: SEVERAL DAYS
2. FEELING DOWN, DEPRESSED OR HOPELESS: SEVERAL DAYS
SUM OF ALL RESPONSES TO PHQ QUESTIONS 1-9: 7
3. TROUBLE FALLING OR STAYING ASLEEP: SEVERAL DAYS

## 2025-07-07 ASSESSMENT — LIFESTYLE VARIABLES
HOW OFTEN DO YOU HAVE A DRINK CONTAINING ALCOHOL: NEVER
HOW MANY STANDARD DRINKS CONTAINING ALCOHOL DO YOU HAVE ON A TYPICAL DAY: PATIENT DOES NOT DRINK

## 2025-07-07 NOTE — TELEPHONE ENCOUNTER
Pt states for 6x months, with extension of arm or coughing/sneezing, will feel tug/pain of right upper leg. Patient expresses concern that there is issue with tendon/nerve/cardiac issue. Patient requesting call back to discuss. Patient provides # [phone] and requests for PCP to leave VM if no answer.

## 2025-07-08 NOTE — PROGRESS NOTES
Medicare Annual Wellness Visit    Eh Bales is here for Follow-up and Medicare AWV    Assessment & Plan  1. Shoulder pain.  - Reports significant pain and numbness in the shoulder, worsening by the end of the day.  - History of full-thickness tear of the supraspinatus tendon with 2.5 cm retraction.  - MRI of the shoulder ordered to assess the current state of the injury.  - Advised to continue using the sling for support.    2. Health maintenance.  - Due for an annual wellness exam.  - Rajwinder will conduct the exam, including memory and mood assessment.  - Insurance is aware of the patient's medical history.    3. Wrist pain.  - Follow-up appointment with Dr. Dalton on 07/22/2025 at 1 PM for wrist evaluation.  - Discussed the effectiveness of the current wrist support.    Follow-up  - Scheduled for a follow-up visit in 3 months or sooner if necessary.    Medicare annual wellness visit, subsequent  Nontraumatic complete tear of right rotator cuff  -     MRI SHOULDER RIGHT WO CONTRAST; Future  Brachial plexopathy  -     MRI SHOULDER RIGHT WO CONTRAST; Future  Insomnia secondary to chronic pain  Moderate recurrent major depression (HCC) - due to current physical condition.  Sleeps well with the Doxepin.  Desires no further treatment    Results  Imaging   - MRI of the shoulder: Full thickness tear of the supraspinatus tendon with 2.5 cm retraction       Return in 3 months (on 10/7/2025).     Subjective     History of Present Illness  The patient presents for evaluation of shoulder pain.    He is scheduled for a consultation for reattachment procedure on 08/07/2025 at 8 AM. He was referred to a brachial plexus specialist, who informed him that the procedure would take approximately 3 months. He is aware that no further intervention can be done for his condition. He has been experiencing severe pain in his shoulder, which intensifies by the end of the day. He also reports persistent numbness in the area. His last MRI

## 2025-07-08 NOTE — PATIENT INSTRUCTIONS
boundaries.  Don't always complain or talk about yourself. Know when it's time to stop talking and listen or just enjoy your friend's company.  Know that good friends can be a bad influence. For example, if a friend encourages you to drink when you know it will harm you, you may want to end the friendship.  Where can you learn more?  Go to https://www.[employer].net/patientEd and enter G092 to learn more about \"Learning About Emotional Support.\"  Current as of: July 31, 2024  Content Version: 14.5  © 9869-2930 [employer].   Care instructions adapted under license by [employer]. If you have questions about a medical condition or this instruction, always ask your healthcare professional. [employer], disclaims any warranty or liability for your use of this information.         Fatigue: Care Instructions  Overview     Fatigue is a feeling of tiredness, exhaustion, or lack of energy. You may feel fatigue because of too much or not enough activity. It can also come from stress, lack of sleep, boredom, and poor diet. Many medical problems, such as viral infections, can cause fatigue. Emotional problems, especially depression, are often the cause of fatigue.  Fatigue is most often a symptom of another problem. Treatment for fatigue depends on the cause. For example, if you have fatigue because you have a certain health problem, treating this problem also treats your fatigue. If depression or anxiety is the cause, treatment may help.  Follow-up care is a key part of your treatment and safety. Be sure to make and go to all appointments, and call your doctor if you are having problems. It's also a good idea to know your test results and keep a list of the medicines you take.  How can you care for yourself at home?  Get regular exercise. But try not to overdo it. It may help to go back and forth between rest and exercise.  Get plenty of rest.  Eat a variety of healthy foods. Try not to skip any

## 2025-07-22 ENCOUNTER — OFFICE VISIT (OUTPATIENT)
Age: 75
End: 2025-07-22
Payer: MEDICARE

## 2025-07-22 VITALS
HEART RATE: 99 BPM | HEIGHT: 67 IN | SYSTOLIC BLOOD PRESSURE: 120 MMHG | WEIGHT: 135 LBS | OXYGEN SATURATION: 97 % | BODY MASS INDEX: 21.19 KG/M2 | DIASTOLIC BLOOD PRESSURE: 64 MMHG | TEMPERATURE: 97.3 F

## 2025-07-22 DIAGNOSIS — M18.12 ARTHRITIS OF CARPOMETACARPAL (CMC) JOINT OF LEFT THUMB: Primary | ICD-10-CM

## 2025-07-22 DIAGNOSIS — F17.200 SMOKER: ICD-10-CM

## 2025-07-22 PROCEDURE — 99214 OFFICE O/P EST MOD 30 MIN: CPT | Performed by: STUDENT IN AN ORGANIZED HEALTH CARE EDUCATION/TRAINING PROGRAM

## 2025-07-22 PROCEDURE — 1159F MED LIST DOCD IN RCRD: CPT | Performed by: STUDENT IN AN ORGANIZED HEALTH CARE EDUCATION/TRAINING PROGRAM

## 2025-07-22 PROCEDURE — 1123F ACP DISCUSS/DSCN MKR DOCD: CPT | Performed by: STUDENT IN AN ORGANIZED HEALTH CARE EDUCATION/TRAINING PROGRAM

## 2025-07-22 PROCEDURE — 99406 BEHAV CHNG SMOKING 3-10 MIN: CPT | Performed by: STUDENT IN AN ORGANIZED HEALTH CARE EDUCATION/TRAINING PROGRAM

## 2025-07-30 ENCOUNTER — HOSPITAL ENCOUNTER (OUTPATIENT)
Dept: MRI IMAGING | Age: 75
Discharge: HOME | End: 2025-08-01
Payer: MEDICARE

## 2025-07-30 DIAGNOSIS — G54.0 BRACHIAL PLEXOPATHY: ICD-10-CM

## 2025-07-30 DIAGNOSIS — M75.121 NONTRAUMATIC COMPLETE TEAR OF RIGHT ROTATOR CUFF: ICD-10-CM

## 2025-07-30 PROCEDURE — 73221 MRI JOINT UPR EXTREM W/O DYE: CPT

## 2025-08-11 ENCOUNTER — OFFICE VISIT (OUTPATIENT)
Age: 75
End: 2025-08-11
Payer: MEDICARE

## 2025-08-11 VITALS
HEIGHT: 67 IN | BODY MASS INDEX: 21.16 KG/M2 | SYSTOLIC BLOOD PRESSURE: 124 MMHG | WEIGHT: 134.8 LBS | TEMPERATURE: 97.7 F | DIASTOLIC BLOOD PRESSURE: 60 MMHG | OXYGEN SATURATION: 95 % | HEART RATE: 80 BPM

## 2025-08-11 DIAGNOSIS — G47.01 INSOMNIA SECONDARY TO CHRONIC PAIN: ICD-10-CM

## 2025-08-11 DIAGNOSIS — M25.532 LEFT WRIST PAIN: ICD-10-CM

## 2025-08-11 DIAGNOSIS — G89.29 INSOMNIA SECONDARY TO CHRONIC PAIN: ICD-10-CM

## 2025-08-11 DIAGNOSIS — F51.01 PRIMARY INSOMNIA: ICD-10-CM

## 2025-08-11 DIAGNOSIS — G54.0 BRACHIAL PLEXOPATHY: ICD-10-CM

## 2025-08-11 DIAGNOSIS — M75.121 NONTRAUMATIC COMPLETE TEAR OF RIGHT ROTATOR CUFF: Primary | ICD-10-CM

## 2025-08-11 PROCEDURE — 1159F MED LIST DOCD IN RCRD: CPT | Performed by: FAMILY MEDICINE

## 2025-08-11 PROCEDURE — 1160F RVW MEDS BY RX/DR IN RCRD: CPT | Performed by: FAMILY MEDICINE

## 2025-08-11 PROCEDURE — 1123F ACP DISCUSS/DSCN MKR DOCD: CPT | Performed by: FAMILY MEDICINE

## 2025-08-11 PROCEDURE — 99214 OFFICE O/P EST MOD 30 MIN: CPT | Performed by: FAMILY MEDICINE

## 2025-08-11 RX ORDER — DOXEPIN HYDROCHLORIDE 10 MG/1
10 CAPSULE ORAL NIGHTLY
Qty: 30 CAPSULE | Refills: 3 | Status: SHIPPED | OUTPATIENT
Start: 2025-08-11

## 2025-08-11 ASSESSMENT — LIFESTYLE VARIABLES
HOW MANY STANDARD DRINKS CONTAINING ALCOHOL DO YOU HAVE ON A TYPICAL DAY: PATIENT DOES NOT DRINK
HOW MANY STANDARD DRINKS CONTAINING ALCOHOL DO YOU HAVE ON A TYPICAL DAY: PATIENT DOES NOT DRINK
HOW OFTEN DO YOU HAVE A DRINK CONTAINING ALCOHOL: NEVER
HOW OFTEN DO YOU HAVE A DRINK CONTAINING ALCOHOL: NEVER

## 2025-08-13 ENCOUNTER — HOSPITAL ENCOUNTER (OUTPATIENT)
Dept: PHYSICAL THERAPY | Age: 75
Setting detail: THERAPIES SERIES
Discharge: HOME | End: 2025-08-13
Payer: MEDICARE

## 2025-08-13 PROCEDURE — 97110 THERAPEUTIC EXERCISES: CPT

## 2025-08-13 PROCEDURE — 97163 PT EVAL HIGH COMPLEX 45 MIN: CPT

## 2025-08-19 ENCOUNTER — HOSPITAL ENCOUNTER (OUTPATIENT)
Dept: PHYSICAL THERAPY | Age: 75
Setting detail: THERAPIES SERIES
Discharge: HOME | End: 2025-08-19
Payer: MEDICARE

## 2025-08-19 PROCEDURE — 97110 THERAPEUTIC EXERCISES: CPT

## 2025-08-19 ASSESSMENT — PAIN DESCRIPTION - ORIENTATION: ORIENTATION: RIGHT

## 2025-08-19 ASSESSMENT — PAIN DESCRIPTION - LOCATION: LOCATION: SHOULDER

## 2025-08-19 ASSESSMENT — PAIN SCALES - GENERAL: PAINLEVEL_OUTOF10: 9

## 2025-08-19 ASSESSMENT — PAIN DESCRIPTION - DESCRIPTORS: DESCRIPTORS: TIGHTNESS

## 2025-08-27 ENCOUNTER — HOSPITAL ENCOUNTER (OUTPATIENT)
Dept: PHYSICAL THERAPY | Age: 75
Setting detail: THERAPIES SERIES
Discharge: HOME | End: 2025-08-27
Payer: MEDICARE

## 2025-08-27 PROCEDURE — 97110 THERAPEUTIC EXERCISES: CPT

## 2025-09-02 ENCOUNTER — HOSPITAL ENCOUNTER (OUTPATIENT)
Dept: PHYSICAL THERAPY | Age: 75
Setting detail: THERAPIES SERIES
Discharge: HOME | End: 2025-09-02